# Patient Record
Sex: FEMALE | Race: WHITE | Employment: FULL TIME | URBAN - METROPOLITAN AREA
[De-identification: names, ages, dates, MRNs, and addresses within clinical notes are randomized per-mention and may not be internally consistent; named-entity substitution may affect disease eponyms.]

---

## 2017-01-17 ENCOUNTER — COMMUNICATION - HEALTHEAST (OUTPATIENT)
Dept: FAMILY MEDICINE | Facility: CLINIC | Age: 32
End: 2017-01-17

## 2017-02-24 ENCOUNTER — OFFICE VISIT - HEALTHEAST (OUTPATIENT)
Dept: FAMILY MEDICINE | Facility: CLINIC | Age: 32
End: 2017-02-24

## 2017-02-24 DIAGNOSIS — M54.50 ACUTE LEFT-SIDED LOW BACK PAIN WITHOUT SCIATICA: ICD-10-CM

## 2017-02-24 ASSESSMENT — MIFFLIN-ST. JEOR: SCORE: 1211.61

## 2017-03-01 ENCOUNTER — COMMUNICATION - HEALTHEAST (OUTPATIENT)
Dept: FAMILY MEDICINE | Facility: CLINIC | Age: 32
End: 2017-03-01

## 2017-03-02 ENCOUNTER — COMMUNICATION - HEALTHEAST (OUTPATIENT)
Dept: FAMILY MEDICINE | Facility: CLINIC | Age: 32
End: 2017-03-02

## 2017-03-02 ENCOUNTER — OFFICE VISIT - HEALTHEAST (OUTPATIENT)
Dept: FAMILY MEDICINE | Facility: CLINIC | Age: 32
End: 2017-03-02

## 2017-03-02 ENCOUNTER — AMBULATORY - HEALTHEAST (OUTPATIENT)
Dept: FAMILY MEDICINE | Facility: CLINIC | Age: 32
End: 2017-03-02

## 2017-03-02 DIAGNOSIS — N76.0 BACTERIAL VAGINOSIS: ICD-10-CM

## 2017-03-02 DIAGNOSIS — N89.8 VAGINAL DISCHARGE: ICD-10-CM

## 2017-03-02 DIAGNOSIS — B96.89 BACTERIAL VAGINOSIS: ICD-10-CM

## 2017-03-02 DIAGNOSIS — M54.50 CHRONIC LEFT-SIDED LOW BACK PAIN WITHOUT SCIATICA: ICD-10-CM

## 2017-03-02 DIAGNOSIS — G89.29 CHRONIC LEFT-SIDED LOW BACK PAIN WITHOUT SCIATICA: ICD-10-CM

## 2017-03-02 ASSESSMENT — MIFFLIN-ST. JEOR: SCORE: 1212.51

## 2017-03-08 ENCOUNTER — HOSPITAL ENCOUNTER (OUTPATIENT)
Dept: MRI IMAGING | Facility: CLINIC | Age: 32
Discharge: HOME | End: 2017-03-08
Attending: NURSE PRACTITIONER

## 2017-03-08 DIAGNOSIS — G89.29 CHRONIC LEFT-SIDED LOW BACK PAIN WITHOUT SCIATICA: ICD-10-CM

## 2017-03-08 DIAGNOSIS — M54.50 CHRONIC LEFT-SIDED LOW BACK PAIN WITHOUT SCIATICA: ICD-10-CM

## 2017-03-10 ENCOUNTER — COMMUNICATION - HEALTHEAST (OUTPATIENT)
Dept: FAMILY MEDICINE | Facility: CLINIC | Age: 32
End: 2017-03-10

## 2017-03-17 ENCOUNTER — COMMUNICATION - HEALTHEAST (OUTPATIENT)
Dept: FAMILY MEDICINE | Facility: CLINIC | Age: 32
End: 2017-03-17

## 2017-04-06 ENCOUNTER — RECORDS - HEALTHEAST (OUTPATIENT)
Dept: ADMINISTRATIVE | Facility: OTHER | Age: 32
End: 2017-04-06

## 2017-04-25 ENCOUNTER — COMMUNICATION - HEALTHEAST (OUTPATIENT)
Dept: FAMILY MEDICINE | Facility: CLINIC | Age: 32
End: 2017-04-25

## 2017-05-08 ENCOUNTER — OFFICE VISIT - HEALTHEAST (OUTPATIENT)
Dept: FAMILY MEDICINE | Facility: CLINIC | Age: 32
End: 2017-05-08

## 2017-05-08 DIAGNOSIS — B96.89 BACTERIAL VAGINOSIS: ICD-10-CM

## 2017-05-08 DIAGNOSIS — N76.0 BACTERIAL VAGINOSIS: ICD-10-CM

## 2017-05-08 ASSESSMENT — MIFFLIN-ST. JEOR: SCORE: 1228.39

## 2017-06-08 ENCOUNTER — OFFICE VISIT - HEALTHEAST (OUTPATIENT)
Dept: FAMILY MEDICINE | Facility: CLINIC | Age: 32
End: 2017-06-08

## 2017-06-08 DIAGNOSIS — F41.9 ANXIETY: ICD-10-CM
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
## 2017-06-08 DIAGNOSIS — G47.00 INSOMNIA, UNSPECIFIED TYPE: ICD-10-CM

## 2017-06-14 ENCOUNTER — COMMUNICATION - HEALTHEAST (OUTPATIENT)
Dept: HEALTH INFORMATION MANAGEMENT | Facility: CLINIC | Age: 32
End: 2017-06-14

## 2017-06-26 ENCOUNTER — COMMUNICATION - HEALTHEAST (OUTPATIENT)
Dept: FAMILY MEDICINE | Facility: CLINIC | Age: 32
End: 2017-06-26

## 2017-06-27 ENCOUNTER — AMBULATORY - HEALTHEAST (OUTPATIENT)
Dept: FAMILY MEDICINE | Facility: CLINIC | Age: 32
End: 2017-06-27

## 2017-06-27 DIAGNOSIS — R10.2 PELVIC PAIN: ICD-10-CM

## 2017-07-13 ENCOUNTER — OFFICE VISIT - HEALTHEAST (OUTPATIENT)
Dept: FAMILY MEDICINE | Facility: CLINIC | Age: 32
End: 2017-07-13

## 2017-07-13 DIAGNOSIS — K13.79 MOUTH SORE: ICD-10-CM

## 2017-07-13 ASSESSMENT — MIFFLIN-ST. JEOR: SCORE: 1205.71

## 2017-07-28 ENCOUNTER — OFFICE VISIT - HEALTHEAST (OUTPATIENT)
Dept: FAMILY MEDICINE | Facility: CLINIC | Age: 32
End: 2017-07-28

## 2017-07-28 DIAGNOSIS — N89.8 VAGINAL ODOR: ICD-10-CM

## 2017-07-28 DIAGNOSIS — N76.0 BACTERIAL VAGINOSIS: ICD-10-CM

## 2017-07-28 DIAGNOSIS — B96.89 BACTERIAL VAGINOSIS: ICD-10-CM

## 2017-07-28 DIAGNOSIS — R30.0 DYSURIA: ICD-10-CM

## 2017-08-22 ENCOUNTER — OFFICE VISIT - HEALTHEAST (OUTPATIENT)
Dept: FAMILY MEDICINE | Facility: CLINIC | Age: 32
End: 2017-08-22

## 2017-08-22 DIAGNOSIS — N89.8 VAGINAL ODOR: ICD-10-CM

## 2017-08-22 DIAGNOSIS — B96.89 BACTERIAL VAGINOSIS: ICD-10-CM

## 2017-08-22 DIAGNOSIS — N76.0 BACTERIAL VAGINOSIS: ICD-10-CM

## 2017-09-18 ENCOUNTER — COMMUNICATION - HEALTHEAST (OUTPATIENT)
Dept: FAMILY MEDICINE | Facility: CLINIC | Age: 32
End: 2017-09-18

## 2017-10-13 ENCOUNTER — OFFICE VISIT - HEALTHEAST (OUTPATIENT)
Dept: FAMILY MEDICINE | Facility: CLINIC | Age: 32
End: 2017-10-13

## 2017-10-13 DIAGNOSIS — B96.89 BACTERIAL VAGINOSIS: ICD-10-CM

## 2017-10-13 DIAGNOSIS — N76.0 BACTERIAL VAGINOSIS: ICD-10-CM

## 2017-11-13 ENCOUNTER — OFFICE VISIT - HEALTHEAST (OUTPATIENT)
Dept: FAMILY MEDICINE | Facility: CLINIC | Age: 32
End: 2017-11-13

## 2017-11-13 DIAGNOSIS — R07.0 THROAT PAIN: ICD-10-CM

## 2017-11-13 DIAGNOSIS — J06.9 VIRAL URI WITH COUGH: ICD-10-CM

## 2017-11-13 DIAGNOSIS — J02.9 ACUTE PHARYNGITIS, UNSPECIFIED ETIOLOGY: ICD-10-CM

## 2017-11-27 ENCOUNTER — COMMUNICATION - HEALTHEAST (OUTPATIENT)
Dept: FAMILY MEDICINE | Facility: CLINIC | Age: 32
End: 2017-11-27

## 2017-11-29 ENCOUNTER — OFFICE VISIT - HEALTHEAST (OUTPATIENT)
Dept: FAMILY MEDICINE | Facility: CLINIC | Age: 32
End: 2017-11-29

## 2017-11-29 DIAGNOSIS — G44.209 MUSCLE TENSION HEADACHE: ICD-10-CM

## 2017-11-29 DIAGNOSIS — R10.13 EPIGASTRIC PAIN: ICD-10-CM

## 2017-11-29 DIAGNOSIS — K29.70 GASTRITIS: ICD-10-CM

## 2017-11-29 DIAGNOSIS — R11.2 NAUSEA & VOMITING: ICD-10-CM

## 2018-01-18 ENCOUNTER — OFFICE VISIT - HEALTHEAST (OUTPATIENT)
Dept: FAMILY MEDICINE | Facility: CLINIC | Age: 33
End: 2018-01-18

## 2018-01-18 DIAGNOSIS — B96.89 BACTERIAL VAGINOSIS: ICD-10-CM

## 2018-01-18 DIAGNOSIS — N76.0 BACTERIAL VAGINOSIS: ICD-10-CM

## 2018-01-18 DIAGNOSIS — M54.2 NECK PAIN: ICD-10-CM

## 2018-01-18 DIAGNOSIS — M77.01 MEDIAL EPICONDYLITIS OF RIGHT ELBOW: ICD-10-CM

## 2018-01-18 ASSESSMENT — MIFFLIN-ST. JEOR: SCORE: 1239.27

## 2018-02-06 ENCOUNTER — OFFICE VISIT - HEALTHEAST (OUTPATIENT)
Dept: FAMILY MEDICINE | Facility: CLINIC | Age: 33
End: 2018-02-06

## 2018-02-06 DIAGNOSIS — Z01.818 ENCOUNTER FOR PREOPERATIVE EXAMINATION FOR GENERAL SURGICAL PROCEDURE: ICD-10-CM

## 2018-02-06 DIAGNOSIS — N83.202 CYST OF LEFT OVARY: ICD-10-CM

## 2018-02-06 DIAGNOSIS — J45.20 MILD INTERMITTENT ASTHMA WITHOUT COMPLICATION: ICD-10-CM

## 2018-02-06 DIAGNOSIS — N80.9 ENDOMETRIOSIS: ICD-10-CM

## 2018-02-06 DIAGNOSIS — M54.2 CERVICALGIA: ICD-10-CM

## 2018-02-06 LAB — HGB BLD-MCNC: 14.8 G/DL (ref 12–16)

## 2018-02-06 ASSESSMENT — MIFFLIN-ST. JEOR: SCORE: 1239.73

## 2018-02-08 ASSESSMENT — MIFFLIN-ST. JEOR: SCORE: 1251.07

## 2018-02-11 ENCOUNTER — RECORDS - HEALTHEAST (OUTPATIENT)
Dept: ADMINISTRATIVE | Facility: OTHER | Age: 33
End: 2018-02-11

## 2018-02-12 ENCOUNTER — ANESTHESIA - HEALTHEAST (OUTPATIENT)
Dept: SURGERY | Facility: AMBULATORY SURGERY CENTER | Age: 33
End: 2018-02-12

## 2018-02-13 ENCOUNTER — SURGERY - HEALTHEAST (OUTPATIENT)
Dept: SURGERY | Facility: AMBULATORY SURGERY CENTER | Age: 33
End: 2018-02-13

## 2018-03-14 ENCOUNTER — RECORDS - HEALTHEAST (OUTPATIENT)
Dept: ADMINISTRATIVE | Facility: OTHER | Age: 33
End: 2018-03-14

## 2018-03-22 ENCOUNTER — RECORDS - HEALTHEAST (OUTPATIENT)
Dept: ADMINISTRATIVE | Facility: OTHER | Age: 33
End: 2018-03-22

## 2018-04-09 ENCOUNTER — OFFICE VISIT - HEALTHEAST (OUTPATIENT)
Dept: FAMILY MEDICINE | Facility: CLINIC | Age: 33
End: 2018-04-09

## 2018-04-09 DIAGNOSIS — A08.4 VIRAL GASTROENTERITIS: ICD-10-CM

## 2018-04-09 DIAGNOSIS — R11.0 NAUSEA: ICD-10-CM

## 2018-04-09 DIAGNOSIS — R23.2 HOT FLASHES: ICD-10-CM

## 2018-04-09 DIAGNOSIS — K64.9 HEMORRHOIDS, UNSPECIFIED HEMORRHOID TYPE: ICD-10-CM

## 2018-04-09 LAB
FSH SERPL-ACNC: 5.8 MIU/ML
LH SERPL-ACNC: 6.3 MIU/ML
TSH SERPL DL<=0.005 MIU/L-ACNC: 1.16 UIU/ML (ref 0.3–5)

## 2018-04-09 ASSESSMENT — MIFFLIN-ST. JEOR: SCORE: 1210.24

## 2018-04-12 LAB
TESTOST SERPL-MCNC: 12 NG/DL (ref 8–60)
TESTOSTERONE, FREE, S - HISTORICAL: 0.16 NG/DL (ref 0.06–1.03)

## 2018-05-01 ENCOUNTER — COMMUNICATION - HEALTHEAST (OUTPATIENT)
Dept: SCHEDULING | Facility: CLINIC | Age: 33
End: 2018-05-01

## 2018-05-02 ENCOUNTER — OFFICE VISIT - HEALTHEAST (OUTPATIENT)
Dept: FAMILY MEDICINE | Facility: CLINIC | Age: 33
End: 2018-05-02

## 2018-05-02 DIAGNOSIS — R11.0 NAUSEA: ICD-10-CM

## 2018-05-02 DIAGNOSIS — R00.2 PALPITATIONS: ICD-10-CM

## 2018-05-02 DIAGNOSIS — F41.1 ANXIETY STATE: ICD-10-CM

## 2018-05-02 LAB
ANION GAP SERPL CALCULATED.3IONS-SCNC: 5 MMOL/L (ref 5–18)
ATRIAL RATE - MUSE: 65 BPM
BUN SERPL-MCNC: 17 MG/DL (ref 8–22)
CALCIUM SERPL-MCNC: 9.6 MG/DL (ref 8.5–10.5)
CHLORIDE BLD-SCNC: 102 MMOL/L (ref 98–107)
CO2 SERPL-SCNC: 26 MMOL/L (ref 22–31)
CREAT SERPL-MCNC: 0.74 MG/DL (ref 0.6–1.1)
DIASTOLIC BLOOD PRESSURE - MUSE: NORMAL MMHG
GFR SERPL CREATININE-BSD FRML MDRD: >60 ML/MIN/1.73M2
GLUCOSE BLD-MCNC: 80 MG/DL (ref 70–125)
INTERPRETATION ECG - MUSE: NORMAL
P AXIS - MUSE: 72 DEGREES
POTASSIUM BLD-SCNC: 4.1 MMOL/L (ref 3.5–5)
PR INTERVAL - MUSE: 144 MS
QRS DURATION - MUSE: 84 MS
QT - MUSE: 390 MS
QTC - MUSE: 405 MS
R AXIS - MUSE: 84 DEGREES
SODIUM SERPL-SCNC: 133 MMOL/L (ref 136–145)
SYSTOLIC BLOOD PRESSURE - MUSE: NORMAL MMHG
T AXIS - MUSE: 51 DEGREES
VENTRICULAR RATE- MUSE: 65 BPM

## 2018-05-02 ASSESSMENT — MIFFLIN-ST. JEOR: SCORE: 1208.43

## 2018-05-17 ENCOUNTER — COMMUNICATION - HEALTHEAST (OUTPATIENT)
Dept: FAMILY MEDICINE | Facility: CLINIC | Age: 33
End: 2018-05-17

## 2018-05-18 ENCOUNTER — RECORDS - HEALTHEAST (OUTPATIENT)
Dept: ADMINISTRATIVE | Facility: OTHER | Age: 33
End: 2018-05-18

## 2018-05-18 ENCOUNTER — COMMUNICATION - HEALTHEAST (OUTPATIENT)
Dept: SCHEDULING | Facility: CLINIC | Age: 33
End: 2018-05-18

## 2018-05-22 ENCOUNTER — RECORDS - HEALTHEAST (OUTPATIENT)
Dept: ADMINISTRATIVE | Facility: OTHER | Age: 33
End: 2018-05-22

## 2018-05-23 ENCOUNTER — COMMUNICATION - HEALTHEAST (OUTPATIENT)
Dept: FAMILY MEDICINE | Facility: CLINIC | Age: 33
End: 2018-05-23

## 2018-05-23 DIAGNOSIS — R11.0 NAUSEA: ICD-10-CM

## 2018-05-24 ENCOUNTER — RECORDS - HEALTHEAST (OUTPATIENT)
Dept: ADMINISTRATIVE | Facility: OTHER | Age: 33
End: 2018-05-24

## 2018-05-30 ENCOUNTER — RECORDS - HEALTHEAST (OUTPATIENT)
Dept: ADMINISTRATIVE | Facility: OTHER | Age: 33
End: 2018-05-30

## 2018-06-01 ENCOUNTER — OFFICE VISIT - HEALTHEAST (OUTPATIENT)
Dept: FAMILY MEDICINE | Facility: CLINIC | Age: 33
End: 2018-06-01

## 2018-06-01 DIAGNOSIS — S16.1XXA ACUTE CERVICAL MYOFASCIAL STRAIN: ICD-10-CM

## 2018-06-01 DIAGNOSIS — S46.811A TRAPEZIUS STRAIN, RIGHT, INITIAL ENCOUNTER: ICD-10-CM

## 2018-06-19 ENCOUNTER — RECORDS - HEALTHEAST (OUTPATIENT)
Dept: ADMINISTRATIVE | Facility: OTHER | Age: 33
End: 2018-06-19

## 2018-06-20 ENCOUNTER — RECORDS - HEALTHEAST (OUTPATIENT)
Dept: ADMINISTRATIVE | Facility: OTHER | Age: 33
End: 2018-06-20

## 2018-07-02 ENCOUNTER — OFFICE VISIT - HEALTHEAST (OUTPATIENT)
Dept: FAMILY MEDICINE | Facility: CLINIC | Age: 33
End: 2018-07-02

## 2018-07-02 DIAGNOSIS — K64.9 HEMORRHOIDS, UNSPECIFIED HEMORRHOID TYPE: ICD-10-CM

## 2018-07-02 DIAGNOSIS — J45.20 MILD INTERMITTENT ASTHMA WITHOUT COMPLICATION: ICD-10-CM

## 2018-07-02 DIAGNOSIS — Z01.818 ENCOUNTER FOR PREOPERATIVE EXAMINATION FOR GENERAL SURGICAL PROCEDURE: ICD-10-CM

## 2018-07-02 LAB — HGB BLD-MCNC: 18.8 G/DL (ref 12–16)

## 2018-07-02 ASSESSMENT — MIFFLIN-ST. JEOR: SCORE: 1202.53

## 2018-07-03 ENCOUNTER — OFFICE VISIT - HEALTHEAST (OUTPATIENT)
Dept: FAMILY MEDICINE | Facility: CLINIC | Age: 33
End: 2018-07-03

## 2018-07-03 ENCOUNTER — RECORDS - HEALTHEAST (OUTPATIENT)
Dept: ADMINISTRATIVE | Facility: OTHER | Age: 33
End: 2018-07-03

## 2018-07-03 DIAGNOSIS — M54.2 NECK PAIN: ICD-10-CM

## 2018-07-03 DIAGNOSIS — M25.50 POLYARTHRALGIA: ICD-10-CM

## 2018-07-03 DIAGNOSIS — M54.9 BACK PAIN: ICD-10-CM

## 2018-07-03 LAB
ERYTHROCYTE [SEDIMENTATION RATE] IN BLOOD BY WESTERGREN METHOD: 2 MM/HR (ref 0–20)
RHEUMATOID FACT SERPL-ACNC: <15 IU/ML (ref 0–30)

## 2018-07-03 ASSESSMENT — MIFFLIN-ST. JEOR: SCORE: 1201.17

## 2018-07-04 LAB — B BURGDOR IGG+IGM SER QL: 0.17 INDEX VALUE

## 2018-07-05 LAB — ANA SER QL: 0.1 U

## 2018-07-08 ENCOUNTER — RECORDS - HEALTHEAST (OUTPATIENT)
Dept: ADMINISTRATIVE | Facility: OTHER | Age: 33
End: 2018-07-08

## 2018-07-18 ENCOUNTER — ANESTHESIA - HEALTHEAST (OUTPATIENT)
Dept: SURGERY | Facility: AMBULATORY SURGERY CENTER | Age: 33
End: 2018-07-18

## 2018-07-19 ENCOUNTER — SURGERY - HEALTHEAST (OUTPATIENT)
Dept: SURGERY | Facility: AMBULATORY SURGERY CENTER | Age: 33
End: 2018-07-19

## 2018-07-19 ASSESSMENT — MIFFLIN-ST. JEOR: SCORE: 1201.17

## 2018-08-09 ENCOUNTER — RECORDS - HEALTHEAST (OUTPATIENT)
Dept: ADMINISTRATIVE | Facility: OTHER | Age: 33
End: 2018-08-09

## 2018-08-11 ENCOUNTER — HOSPITAL ENCOUNTER (OUTPATIENT)
Dept: MRI IMAGING | Facility: CLINIC | Age: 33
Discharge: HOME | End: 2018-08-11
Attending: COLON & RECTAL SURGERY

## 2018-08-11 DIAGNOSIS — K61.1 PERIRECTAL ABSCESS: ICD-10-CM

## 2018-08-13 ENCOUNTER — HOSPITAL ENCOUNTER (OUTPATIENT)
Dept: PALLIATIVE MEDICINE | Facility: OTHER | Age: 33
Discharge: HOME | End: 2018-08-13
Attending: ANESTHESIOLOGY

## 2018-08-13 DIAGNOSIS — G89.4 CHRONIC PAIN SYNDROME: ICD-10-CM

## 2018-08-15 ENCOUNTER — OFFICE VISIT - HEALTHEAST (OUTPATIENT)
Dept: FAMILY MEDICINE | Facility: CLINIC | Age: 33
End: 2018-08-15

## 2018-08-15 ENCOUNTER — COMMUNICATION - HEALTHEAST (OUTPATIENT)
Dept: SCHEDULING | Facility: CLINIC | Age: 33
End: 2018-08-15

## 2018-08-15 DIAGNOSIS — K61.1 PERIRECTAL ABSCESS: ICD-10-CM

## 2018-08-15 DIAGNOSIS — K64.4 EXTERNAL HEMORRHOIDS: ICD-10-CM

## 2018-11-08 ENCOUNTER — OFFICE VISIT - HEALTHEAST (OUTPATIENT)
Dept: FAMILY MEDICINE | Facility: CLINIC | Age: 33
End: 2018-11-08

## 2018-11-08 DIAGNOSIS — R51.9 CHRONIC NONINTRACTABLE HEADACHE, UNSPECIFIED HEADACHE TYPE: ICD-10-CM

## 2018-11-08 DIAGNOSIS — G89.29 CHRONIC NONINTRACTABLE HEADACHE, UNSPECIFIED HEADACHE TYPE: ICD-10-CM

## 2018-11-08 DIAGNOSIS — J01.10 ACUTE NON-RECURRENT FRONTAL SINUSITIS: ICD-10-CM

## 2018-11-08 DIAGNOSIS — F33.1 MODERATE EPISODE OF RECURRENT MAJOR DEPRESSIVE DISORDER (H): ICD-10-CM

## 2018-11-08 ASSESSMENT — MIFFLIN-ST. JEOR: SCORE: 1234.74

## 2018-11-13 ENCOUNTER — COMMUNICATION - HEALTHEAST (OUTPATIENT)
Dept: FAMILY MEDICINE | Facility: CLINIC | Age: 33
End: 2018-11-13

## 2018-11-13 ENCOUNTER — OFFICE VISIT - HEALTHEAST (OUTPATIENT)
Dept: FAMILY MEDICINE | Facility: CLINIC | Age: 33
End: 2018-11-13

## 2018-11-13 DIAGNOSIS — N89.8 VAGINAL ODOR: ICD-10-CM

## 2018-11-13 LAB
CLUE CELLS: NORMAL
TRICHOMONAS, WET PREP: NORMAL
YEAST, WET PREP: NORMAL

## 2018-11-26 ENCOUNTER — RECORDS - HEALTHEAST (OUTPATIENT)
Dept: ADMINISTRATIVE | Facility: OTHER | Age: 33
End: 2018-11-26

## 2018-11-30 ENCOUNTER — RECORDS - HEALTHEAST (OUTPATIENT)
Dept: ADMINISTRATIVE | Facility: OTHER | Age: 33
End: 2018-11-30

## 2018-12-04 ENCOUNTER — OFFICE VISIT - HEALTHEAST (OUTPATIENT)
Dept: FAMILY MEDICINE | Facility: CLINIC | Age: 33
End: 2018-12-04

## 2018-12-04 DIAGNOSIS — B96.89 BACTERIAL VAGINOSIS: ICD-10-CM

## 2018-12-04 DIAGNOSIS — N76.0 BACTERIAL VAGINOSIS: ICD-10-CM

## 2018-12-04 DIAGNOSIS — K59.03 DRUG-INDUCED CONSTIPATION: ICD-10-CM

## 2018-12-04 LAB
CLUE CELLS: ABNORMAL
TRICHOMONAS, WET PREP: ABNORMAL
YEAST, WET PREP: ABNORMAL

## 2018-12-05 ENCOUNTER — COMMUNICATION - HEALTHEAST (OUTPATIENT)
Dept: FAMILY MEDICINE | Facility: CLINIC | Age: 33
End: 2018-12-05

## 2018-12-11 ENCOUNTER — COMMUNICATION - HEALTHEAST (OUTPATIENT)
Dept: SCHEDULING | Facility: CLINIC | Age: 33
End: 2018-12-11

## 2018-12-18 ENCOUNTER — OFFICE VISIT - HEALTHEAST (OUTPATIENT)
Dept: FAMILY MEDICINE | Facility: CLINIC | Age: 33
End: 2018-12-18

## 2018-12-18 DIAGNOSIS — G89.29 CHRONIC NECK PAIN: ICD-10-CM

## 2018-12-18 DIAGNOSIS — M54.2 CHRONIC NECK PAIN: ICD-10-CM

## 2018-12-18 ASSESSMENT — MIFFLIN-ST. JEOR: SCORE: 1232.47

## 2018-12-19 ENCOUNTER — COMMUNICATION - HEALTHEAST (OUTPATIENT)
Dept: FAMILY MEDICINE | Facility: CLINIC | Age: 33
End: 2018-12-19

## 2018-12-19 DIAGNOSIS — G89.29 CHRONIC NONINTRACTABLE HEADACHE, UNSPECIFIED HEADACHE TYPE: ICD-10-CM

## 2018-12-19 DIAGNOSIS — R51.9 CHRONIC NONINTRACTABLE HEADACHE, UNSPECIFIED HEADACHE TYPE: ICD-10-CM

## 2019-01-10 ENCOUNTER — HOSPITAL ENCOUNTER (OUTPATIENT)
Dept: MRI IMAGING | Facility: CLINIC | Age: 34
Discharge: HOME | End: 2019-01-10
Attending: NURSE PRACTITIONER

## 2019-01-10 ENCOUNTER — AMBULATORY - HEALTHEAST (OUTPATIENT)
Dept: FAMILY MEDICINE | Facility: CLINIC | Age: 34
End: 2019-01-10

## 2019-01-10 ENCOUNTER — COMMUNICATION - HEALTHEAST (OUTPATIENT)
Dept: FAMILY MEDICINE | Facility: CLINIC | Age: 34
End: 2019-01-10

## 2019-01-10 DIAGNOSIS — R11.0 NAUSEA: ICD-10-CM

## 2019-01-23 ENCOUNTER — RECORDS - HEALTHEAST (OUTPATIENT)
Dept: ADMINISTRATIVE | Facility: OTHER | Age: 34
End: 2019-01-23

## 2019-02-01 ENCOUNTER — RECORDS - HEALTHEAST (OUTPATIENT)
Dept: ADMINISTRATIVE | Facility: OTHER | Age: 34
End: 2019-02-01

## 2019-02-26 ENCOUNTER — OFFICE VISIT - HEALTHEAST (OUTPATIENT)
Dept: FAMILY MEDICINE | Facility: CLINIC | Age: 34
End: 2019-02-26

## 2019-02-26 DIAGNOSIS — M54.2 CERVICALGIA: ICD-10-CM

## 2019-02-26 DIAGNOSIS — R11.0 NAUSEA: ICD-10-CM

## 2019-02-26 DIAGNOSIS — M54.12 CERVICAL RADICULOPATHY: ICD-10-CM

## 2019-02-26 ASSESSMENT — MIFFLIN-ST. JEOR: SCORE: 1223.4

## 2019-03-11 ENCOUNTER — COMMUNICATION - HEALTHEAST (OUTPATIENT)
Dept: FAMILY MEDICINE | Facility: CLINIC | Age: 34
End: 2019-03-11

## 2019-03-11 DIAGNOSIS — R11.0 NAUSEA: ICD-10-CM

## 2019-03-25 ENCOUNTER — RECORDS - HEALTHEAST (OUTPATIENT)
Dept: ADMINISTRATIVE | Facility: OTHER | Age: 34
End: 2019-03-25

## 2019-03-26 ENCOUNTER — COMMUNICATION - HEALTHEAST (OUTPATIENT)
Dept: FAMILY MEDICINE | Facility: CLINIC | Age: 34
End: 2019-03-26

## 2019-03-26 DIAGNOSIS — Z11.1 SCREENING-PULMONARY TB: ICD-10-CM

## 2019-04-02 ENCOUNTER — AMBULATORY - HEALTHEAST (OUTPATIENT)
Dept: NURSING | Facility: CLINIC | Age: 34
End: 2019-04-02

## 2019-04-02 DIAGNOSIS — Z11.1 SCREENING-PULMONARY TB: ICD-10-CM

## 2019-04-03 ENCOUNTER — COMMUNICATION - HEALTHEAST (OUTPATIENT)
Dept: FAMILY MEDICINE | Facility: CLINIC | Age: 34
End: 2019-04-03

## 2019-04-03 DIAGNOSIS — R11.0 NAUSEA: ICD-10-CM

## 2019-05-09 ENCOUNTER — OFFICE VISIT - HEALTHEAST (OUTPATIENT)
Dept: FAMILY MEDICINE | Facility: CLINIC | Age: 34
End: 2019-05-09

## 2019-05-09 DIAGNOSIS — M54.2 CERVICALGIA: ICD-10-CM

## 2019-05-09 DIAGNOSIS — R11.0 NAUSEA: ICD-10-CM

## 2019-05-09 DIAGNOSIS — R53.83 FATIGUE, UNSPECIFIED TYPE: ICD-10-CM

## 2019-05-09 DIAGNOSIS — J45.20 MILD INTERMITTENT ASTHMA WITHOUT COMPLICATION: ICD-10-CM

## 2019-05-09 LAB
ALBUMIN SERPL-MCNC: 4.3 G/DL (ref 3.5–5)
ALP SERPL-CCNC: 59 U/L (ref 45–120)
ALT SERPL W P-5'-P-CCNC: 17 U/L (ref 0–45)
ANION GAP SERPL CALCULATED.3IONS-SCNC: 9 MMOL/L (ref 5–18)
AST SERPL W P-5'-P-CCNC: 19 U/L (ref 0–40)
BILIRUB SERPL-MCNC: 0.5 MG/DL (ref 0–1)
BUN SERPL-MCNC: 15 MG/DL (ref 8–22)
CALCIUM SERPL-MCNC: 9.8 MG/DL (ref 8.5–10.5)
CHLORIDE BLD-SCNC: 106 MMOL/L (ref 98–107)
CO2 SERPL-SCNC: 26 MMOL/L (ref 22–31)
CREAT SERPL-MCNC: 0.93 MG/DL (ref 0.6–1.1)
ERYTHROCYTE [DISTWIDTH] IN BLOOD BY AUTOMATED COUNT: 10.7 % (ref 11–14.5)
ERYTHROCYTE [SEDIMENTATION RATE] IN BLOOD BY WESTERGREN METHOD: 1 MM/HR (ref 0–20)
GFR SERPL CREATININE-BSD FRML MDRD: >60 ML/MIN/1.73M2
GLUCOSE BLD-MCNC: 72 MG/DL (ref 70–125)
HCT VFR BLD AUTO: 45.5 % (ref 35–47)
HGB BLD-MCNC: 15.1 G/DL (ref 12–16)
MCH RBC QN AUTO: 34 PG (ref 27–34)
MCHC RBC AUTO-ENTMCNC: 33.2 G/DL (ref 32–36)
MCV RBC AUTO: 102 FL (ref 80–100)
PLATELET # BLD AUTO: 228 THOU/UL (ref 140–440)
PMV BLD AUTO: 8.8 FL (ref 7–10)
POTASSIUM BLD-SCNC: 4.6 MMOL/L (ref 3.5–5)
PROT SERPL-MCNC: 6.6 G/DL (ref 6–8)
RBC # BLD AUTO: 4.44 MILL/UL (ref 3.8–5.4)
SODIUM SERPL-SCNC: 141 MMOL/L (ref 136–145)
TSH SERPL DL<=0.005 MIU/L-ACNC: 2.07 UIU/ML (ref 0.3–5)
VIT B12 SERPL-MCNC: 1136 PG/ML (ref 213–816)
WBC: 8 THOU/UL (ref 4–11)

## 2019-05-09 ASSESSMENT — MIFFLIN-ST. JEOR: SCORE: 1217.5

## 2019-05-10 ENCOUNTER — COMMUNICATION - HEALTHEAST (OUTPATIENT)
Dept: FAMILY MEDICINE | Facility: CLINIC | Age: 34
End: 2019-05-10

## 2019-05-10 LAB — 25(OH)D3 SERPL-MCNC: 45.7 NG/ML (ref 30–80)

## 2019-06-03 ENCOUNTER — RECORDS - HEALTHEAST (OUTPATIENT)
Dept: ADMINISTRATIVE | Facility: OTHER | Age: 34
End: 2019-06-03

## 2019-06-03 ENCOUNTER — COMMUNICATION - HEALTHEAST (OUTPATIENT)
Dept: FAMILY MEDICINE | Facility: CLINIC | Age: 34
End: 2019-06-03

## 2019-06-11 ENCOUNTER — RECORDS - HEALTHEAST (OUTPATIENT)
Dept: ADMINISTRATIVE | Facility: OTHER | Age: 34
End: 2019-06-11

## 2019-07-11 ENCOUNTER — OFFICE VISIT - HEALTHEAST (OUTPATIENT)
Dept: FAMILY MEDICINE | Facility: CLINIC | Age: 34
End: 2019-07-11

## 2019-07-11 ENCOUNTER — COMMUNICATION - HEALTHEAST (OUTPATIENT)
Dept: SCHEDULING | Facility: CLINIC | Age: 34
End: 2019-07-11

## 2019-07-11 DIAGNOSIS — R10.13 DYSPEPSIA: ICD-10-CM

## 2019-07-11 DIAGNOSIS — R11.0 NAUSEA: ICD-10-CM

## 2019-09-17 ENCOUNTER — RECORDS - HEALTHEAST (OUTPATIENT)
Dept: ADMINISTRATIVE | Facility: OTHER | Age: 34
End: 2019-09-17

## 2019-10-10 ENCOUNTER — OFFICE VISIT - HEALTHEAST (OUTPATIENT)
Dept: FAMILY MEDICINE | Facility: CLINIC | Age: 34
End: 2019-10-10

## 2019-10-10 DIAGNOSIS — A08.4 VIRAL GASTROENTERITIS: ICD-10-CM

## 2019-10-10 ASSESSMENT — PATIENT HEALTH QUESTIONNAIRE - PHQ9: SUM OF ALL RESPONSES TO PHQ QUESTIONS 1-9: 5

## 2019-10-24 ENCOUNTER — OFFICE VISIT - HEALTHEAST (OUTPATIENT)
Dept: FAMILY MEDICINE | Facility: CLINIC | Age: 34
End: 2019-10-24

## 2019-10-24 DIAGNOSIS — J45.21 MILD INTERMITTENT ASTHMA WITH EXACERBATION: ICD-10-CM

## 2019-10-24 DIAGNOSIS — A08.4 VIRAL GASTROENTERITIS: ICD-10-CM

## 2019-12-25 ENCOUNTER — RECORDS - HEALTHEAST (OUTPATIENT)
Dept: ADMINISTRATIVE | Facility: OTHER | Age: 34
End: 2019-12-25

## 2019-12-26 ENCOUNTER — OFFICE VISIT - HEALTHEAST (OUTPATIENT)
Dept: FAMILY MEDICINE | Facility: CLINIC | Age: 34
End: 2019-12-26

## 2019-12-26 DIAGNOSIS — A08.4 VIRAL GASTROENTERITIS: ICD-10-CM

## 2019-12-26 DIAGNOSIS — S06.0X9A CONCUSSION WITH LOSS OF CONSCIOUSNESS, INITIAL ENCOUNTER: ICD-10-CM

## 2019-12-26 ASSESSMENT — MIFFLIN-ST. JEOR: SCORE: 1243.81

## 2020-02-18 ENCOUNTER — RECORDS - HEALTHEAST (OUTPATIENT)
Dept: ADMINISTRATIVE | Facility: OTHER | Age: 35
End: 2020-02-18

## 2020-02-21 ENCOUNTER — COMMUNICATION - HEALTHEAST (OUTPATIENT)
Dept: SCHEDULING | Facility: CLINIC | Age: 35
End: 2020-02-21

## 2020-02-21 ENCOUNTER — OFFICE VISIT - HEALTHEAST (OUTPATIENT)
Dept: FAMILY MEDICINE | Facility: CLINIC | Age: 35
End: 2020-02-21

## 2020-02-21 DIAGNOSIS — K21.9 GASTROESOPHAGEAL REFLUX DISEASE, ESOPHAGITIS PRESENCE NOT SPECIFIED: ICD-10-CM

## 2020-02-21 DIAGNOSIS — A08.4 VIRAL GASTROENTERITIS: ICD-10-CM

## 2020-03-06 ENCOUNTER — COMMUNICATION - HEALTHEAST (OUTPATIENT)
Dept: SCHEDULING | Facility: CLINIC | Age: 35
End: 2020-03-06

## 2020-03-09 ENCOUNTER — COMMUNICATION - HEALTHEAST (OUTPATIENT)
Dept: SCHEDULING | Facility: CLINIC | Age: 35
End: 2020-03-09

## 2020-03-31 ENCOUNTER — OFFICE VISIT - HEALTHEAST (OUTPATIENT)
Dept: FAMILY MEDICINE | Facility: CLINIC | Age: 35
End: 2020-03-31

## 2020-03-31 DIAGNOSIS — R11.0 NAUSEA: ICD-10-CM

## 2020-03-31 DIAGNOSIS — G43.809 OTHER MIGRAINE WITHOUT STATUS MIGRAINOSUS, NOT INTRACTABLE: ICD-10-CM

## 2020-03-31 ASSESSMENT — PATIENT HEALTH QUESTIONNAIRE - PHQ9: SUM OF ALL RESPONSES TO PHQ QUESTIONS 1-9: 0

## 2020-04-14 ENCOUNTER — COMMUNICATION - HEALTHEAST (OUTPATIENT)
Dept: SCHEDULING | Facility: CLINIC | Age: 35
End: 2020-04-14

## 2020-04-20 ENCOUNTER — COMMUNICATION - HEALTHEAST (OUTPATIENT)
Dept: SCHEDULING | Facility: CLINIC | Age: 35
End: 2020-04-20

## 2020-04-20 ENCOUNTER — OFFICE VISIT - HEALTHEAST (OUTPATIENT)
Dept: FAMILY MEDICINE | Facility: CLINIC | Age: 35
End: 2020-04-20

## 2020-04-20 DIAGNOSIS — R00.2 PALPITATIONS: ICD-10-CM

## 2020-04-20 DIAGNOSIS — F41.9 ANXIETY: ICD-10-CM

## 2020-04-20 DIAGNOSIS — R06.02 SHORTNESS OF BREATH: ICD-10-CM

## 2020-04-20 DIAGNOSIS — D58.2 ELEVATED HEMOGLOBIN (H): ICD-10-CM

## 2020-04-23 ENCOUNTER — AMBULATORY - HEALTHEAST (OUTPATIENT)
Dept: LAB | Facility: CLINIC | Age: 35
End: 2020-04-23

## 2020-04-23 DIAGNOSIS — R00.2 PALPITATIONS: ICD-10-CM

## 2020-04-23 LAB
ANION GAP SERPL CALCULATED.3IONS-SCNC: 9 MMOL/L (ref 5–18)
BUN SERPL-MCNC: 16 MG/DL (ref 8–22)
CALCIUM SERPL-MCNC: 9 MG/DL (ref 8.5–10.5)
CHLORIDE BLD-SCNC: 108 MMOL/L (ref 98–107)
CO2 SERPL-SCNC: 23 MMOL/L (ref 22–31)
CREAT SERPL-MCNC: 0.86 MG/DL (ref 0.6–1.1)
ERYTHROCYTE [DISTWIDTH] IN BLOOD BY AUTOMATED COUNT: 10.5 % (ref 11–14.5)
GFR SERPL CREATININE-BSD FRML MDRD: >60 ML/MIN/1.73M2
GLUCOSE BLD-MCNC: 104 MG/DL (ref 70–125)
HCT VFR BLD AUTO: 45.6 % (ref 35–47)
HGB BLD-MCNC: 15.9 G/DL (ref 12–16)
MCH RBC QN AUTO: 34.2 PG (ref 27–34)
MCHC RBC AUTO-ENTMCNC: 34.9 G/DL (ref 32–36)
MCV RBC AUTO: 98 FL (ref 80–100)
PLATELET # BLD AUTO: 226 THOU/UL (ref 140–440)
PMV BLD AUTO: 8.2 FL (ref 7–10)
POTASSIUM BLD-SCNC: 4.2 MMOL/L (ref 3.5–5)
RBC # BLD AUTO: 4.65 MILL/UL (ref 3.8–5.4)
SODIUM SERPL-SCNC: 140 MMOL/L (ref 136–145)
TSH SERPL DL<=0.005 MIU/L-ACNC: 0.91 UIU/ML (ref 0.3–5)
WBC: 8.3 THOU/UL (ref 4–11)

## 2020-04-27 ENCOUNTER — OFFICE VISIT - HEALTHEAST (OUTPATIENT)
Dept: CARDIOLOGY | Facility: CLINIC | Age: 35
End: 2020-04-27

## 2020-04-27 DIAGNOSIS — R07.2 PRECORDIAL PAIN: ICD-10-CM

## 2020-04-27 DIAGNOSIS — R00.2 PALPITATIONS: ICD-10-CM

## 2020-04-27 ASSESSMENT — MIFFLIN-ST. JEOR: SCORE: 1237.46

## 2020-05-06 ENCOUNTER — HOSPITAL ENCOUNTER (OUTPATIENT)
Dept: CARDIOLOGY | Facility: HOSPITAL | Age: 35
Discharge: HOME | End: 2020-05-06
Attending: INTERNAL MEDICINE

## 2020-05-06 LAB
CV STRESS CURRENT BP HE: NORMAL
CV STRESS CURRENT HR HE: 100
CV STRESS CURRENT HR HE: 101
CV STRESS CURRENT HR HE: 103
CV STRESS CURRENT HR HE: 103
CV STRESS CURRENT HR HE: 105
CV STRESS CURRENT HR HE: 107
CV STRESS CURRENT HR HE: 107
CV STRESS CURRENT HR HE: 108
CV STRESS CURRENT HR HE: 109
CV STRESS CURRENT HR HE: 111
CV STRESS CURRENT HR HE: 111
CV STRESS CURRENT HR HE: 113
CV STRESS CURRENT HR HE: 115
CV STRESS CURRENT HR HE: 115
CV STRESS CURRENT HR HE: 123
CV STRESS CURRENT HR HE: 125
CV STRESS CURRENT HR HE: 126
CV STRESS CURRENT HR HE: 127
CV STRESS CURRENT HR HE: 133
CV STRESS CURRENT HR HE: 140
CV STRESS CURRENT HR HE: 141
CV STRESS CURRENT HR HE: 143
CV STRESS CURRENT HR HE: 146
CV STRESS CURRENT HR HE: 150
CV STRESS CURRENT HR HE: 157
CV STRESS CURRENT HR HE: 164
CV STRESS CURRENT HR HE: 164
CV STRESS CURRENT HR HE: 169
CV STRESS CURRENT HR HE: 87
CV STRESS CURRENT HR HE: 89
CV STRESS CURRENT HR HE: 94
CV STRESS CURRENT HR HE: 94
CV STRESS DEVIATION TIME HE: NORMAL
CV STRESS ECHO PERCENT HR HE: NORMAL
CV STRESS EXERCISE STAGE HE: NORMAL
CV STRESS FINAL RESTING BP HE: NORMAL
CV STRESS FINAL RESTING HR HE: 94
CV STRESS MAX HR HE: 166
CV STRESS MAX TREADMILL GRADE HE: 16
CV STRESS MAX TREADMILL SPEED HE: 4.2
CV STRESS PEAK DIA BP HE: NORMAL
CV STRESS PEAK SYS BP HE: NORMAL
CV STRESS PHASE HE: NORMAL
CV STRESS PROTOCOL HE: NORMAL
CV STRESS RESTING PT POSITION HE: NORMAL
CV STRESS ST DEVIATION AMOUNT HE: NORMAL
CV STRESS ST DEVIATION ELEVATION HE: NORMAL
CV STRESS ST EVELATION AMOUNT HE: NORMAL
CV STRESS TEST TYPE HE: NORMAL
CV STRESS TOTAL STAGE TIME MIN 1 HE: NORMAL
ECHO EJECTION FRACTION ESTIMATED: 60 %
RATE PRESSURE PRODUCT: NORMAL
STRESS ECHO BASELINE DIASTOLIC HE: 74
STRESS ECHO BASELINE HR: 98
STRESS ECHO BASELINE SYSTOLIC BP: 104
STRESS ECHO CALCULATED PERCENT HR: 90 %
STRESS ECHO LAST STRESS DIASTOLIC BP: 80
STRESS ECHO LAST STRESS HR: 169
STRESS ECHO LAST STRESS SYSTOLIC BP: 156
STRESS ECHO POST ESTIMATED WORKLOAD: 11.9
STRESS ECHO POST EXERCISE DUR MIN: 10
STRESS ECHO POST EXERCISE DUR SEC: 15
STRESS ECHO TARGET HR: 185

## 2020-05-21 ENCOUNTER — OFFICE VISIT - HEALTHEAST (OUTPATIENT)
Dept: CARDIOLOGY | Facility: CLINIC | Age: 35
End: 2020-05-21

## 2020-05-21 DIAGNOSIS — R00.2 PALPITATIONS: ICD-10-CM

## 2020-06-09 ENCOUNTER — OFFICE VISIT - HEALTHEAST (OUTPATIENT)
Dept: FAMILY MEDICINE | Facility: CLINIC | Age: 35
End: 2020-06-09

## 2020-06-09 DIAGNOSIS — R11.0 NAUSEA: ICD-10-CM

## 2020-06-09 DIAGNOSIS — M54.2 NECK PAIN: ICD-10-CM

## 2020-06-11 ENCOUNTER — COMMUNICATION - HEALTHEAST (OUTPATIENT)
Dept: CARDIOLOGY | Facility: CLINIC | Age: 35
End: 2020-06-11

## 2020-06-17 ENCOUNTER — COMMUNICATION - HEALTHEAST (OUTPATIENT)
Dept: FAMILY MEDICINE | Facility: CLINIC | Age: 35
End: 2020-06-17

## 2020-07-06 ENCOUNTER — COMMUNICATION - HEALTHEAST (OUTPATIENT)
Dept: NURSING | Facility: CLINIC | Age: 35
End: 2020-07-06

## 2020-07-06 ENCOUNTER — AMBULATORY - HEALTHEAST (OUTPATIENT)
Dept: CARE COORDINATION | Facility: CLINIC | Age: 35
End: 2020-07-06

## 2020-07-06 DIAGNOSIS — K04.7 DENTAL ABSCESS: ICD-10-CM

## 2020-07-10 ENCOUNTER — COMMUNICATION - HEALTHEAST (OUTPATIENT)
Dept: CARE COORDINATION | Facility: CLINIC | Age: 35
End: 2020-07-10

## 2020-07-14 ENCOUNTER — COMMUNICATION - HEALTHEAST (OUTPATIENT)
Dept: NURSING | Facility: CLINIC | Age: 35
End: 2020-07-14

## 2020-07-28 ENCOUNTER — RECORDS - HEALTHEAST (OUTPATIENT)
Dept: ADMINISTRATIVE | Facility: OTHER | Age: 35
End: 2020-07-28

## 2020-07-31 ENCOUNTER — OFFICE VISIT - HEALTHEAST (OUTPATIENT)
Dept: FAMILY MEDICINE | Facility: CLINIC | Age: 35
End: 2020-07-31

## 2020-07-31 DIAGNOSIS — M54.2 NECK PAIN: ICD-10-CM

## 2020-07-31 DIAGNOSIS — G44.209 TENSION HEADACHE: ICD-10-CM

## 2020-07-31 DIAGNOSIS — R11.0 NAUSEA: ICD-10-CM

## 2020-08-03 ENCOUNTER — COMMUNICATION - HEALTHEAST (OUTPATIENT)
Dept: FAMILY MEDICINE | Facility: CLINIC | Age: 35
End: 2020-08-03

## 2020-09-16 ENCOUNTER — VIRTUAL VISIT (OUTPATIENT)
Dept: NEUROLOGY | Facility: CLINIC | Age: 35
End: 2020-09-16
Payer: COMMERCIAL

## 2020-09-16 ENCOUNTER — RECORDS - HEALTHEAST (OUTPATIENT)
Dept: ADMINISTRATIVE | Facility: OTHER | Age: 35
End: 2020-09-16

## 2020-09-16 VITALS — BODY MASS INDEX: 21 KG/M2 | HEIGHT: 64 IN | WEIGHT: 123 LBS

## 2020-09-16 DIAGNOSIS — G47.00 INSOMNIA, UNSPECIFIED TYPE: ICD-10-CM

## 2020-09-16 DIAGNOSIS — G43.719 INTRACTABLE CHRONIC MIGRAINE WITHOUT AURA AND WITHOUT STATUS MIGRAINOSUS: Primary | ICD-10-CM

## 2020-09-16 DIAGNOSIS — M54.2 NECK PAIN: ICD-10-CM

## 2020-09-16 PROBLEM — F41.1 ANXIETY STATE: Status: ACTIVE | Noted: 2020-09-16

## 2020-09-16 PROBLEM — N83.202 LEFT OVARIAN CYST: Status: ACTIVE | Noted: 2018-02-13

## 2020-09-16 PROBLEM — G44.209 TENSION-TYPE HEADACHE: Status: ACTIVE | Noted: 2020-09-16

## 2020-09-16 PROBLEM — N80.9 ENDOMETRIOSIS: Status: ACTIVE | Noted: 2020-09-16

## 2020-09-16 PROBLEM — F33.9 MAJOR DEPRESSION, RECURRENT (H): Status: ACTIVE | Noted: 2020-09-16

## 2020-09-16 PROBLEM — R10.2 PELVIC PAIN: Status: ACTIVE | Noted: 2018-02-13

## 2020-09-16 PROCEDURE — 99214 OFFICE O/P EST MOD 30 MIN: CPT | Mod: GT | Performed by: PSYCHIATRY & NEUROLOGY

## 2020-09-16 RX ORDER — RIZATRIPTAN BENZOATE 10 MG/1
10 TABLET ORAL
COMMUNITY
Start: 2019-02-01 | End: 2020-09-16

## 2020-09-16 RX ORDER — PREDNISONE 10 MG/1
TABLET ORAL
COMMUNITY
Start: 2019-10-24 | End: 2020-09-16

## 2020-09-16 RX ORDER — ALBUTEROL SULFATE 0.83 MG/ML
SOLUTION RESPIRATORY (INHALATION)
COMMUNITY
Start: 2019-10-27 | End: 2020-09-16

## 2020-09-16 RX ORDER — PENICILLIN V POTASSIUM 500 MG/1
TABLET, FILM COATED ORAL
COMMUNITY
Start: 2020-07-03 | End: 2020-09-16

## 2020-09-16 RX ORDER — CYCLOBENZAPRINE HCL 5 MG
TABLET ORAL
COMMUNITY
Start: 2020-07-31 | End: 2020-09-16

## 2020-09-16 RX ORDER — HYDROCODONE BITARTRATE AND ACETAMINOPHEN 5; 325 MG/1; MG/1
TABLET ORAL
COMMUNITY
Start: 2020-07-03 | End: 2020-10-29

## 2020-09-16 RX ORDER — ELETRIPTAN HYDROBROMIDE 40 MG/1
40 TABLET, FILM COATED ORAL
Qty: 12 TABLET | Refills: 1 | Status: SHIPPED | OUTPATIENT
Start: 2020-09-16 | End: 2021-08-17

## 2020-09-16 RX ORDER — TOPIRAMATE 25 MG/1
TABLET, FILM COATED ORAL
COMMUNITY
Start: 2020-03-31 | End: 2020-09-16

## 2020-09-16 RX ORDER — NAPROXEN 500 MG/1
TABLET ORAL
COMMUNITY
Start: 2019-12-26 | End: 2020-10-29

## 2020-09-16 RX ORDER — OMEPRAZOLE 40 MG/1
CAPSULE, DELAYED RELEASE ORAL
COMMUNITY
Start: 2020-02-21 | End: 2020-09-16

## 2020-09-16 RX ORDER — PROPRANOLOL HYDROCHLORIDE 20 MG/1
TABLET ORAL
COMMUNITY
Start: 2020-05-21 | End: 2020-09-16

## 2020-09-16 RX ORDER — ONDANSETRON 4 MG/1
TABLET, FILM COATED ORAL
COMMUNITY
Start: 2018-12-21 | End: 2020-09-16

## 2020-09-16 RX ORDER — CEFDINIR 300 MG/1
CAPSULE ORAL
COMMUNITY
Start: 2019-10-27 | End: 2020-10-29

## 2020-09-16 RX ORDER — ONDANSETRON 4 MG/1
TABLET, ORALLY DISINTEGRATING ORAL
COMMUNITY
Start: 2020-07-31 | End: 2020-09-16

## 2020-09-16 RX ORDER — NORTRIPTYLINE HCL 10 MG
10 CAPSULE ORAL
COMMUNITY
Start: 2019-02-01 | End: 2020-10-29

## 2020-09-16 ASSESSMENT — MIFFLIN-ST. JEOR: SCORE: 1237.92

## 2020-09-16 NOTE — PROGRESS NOTES
"NEUROLOGY OUTPATIENT PROGRESS NOTE (VIDEO)  Sep 16, 2020     CHIEF COMPLAINT/REASON FOR VISIT/REASON FOR CONSULT  Patient presents with:  Headache    REASON FOR CONSULTATION- Worsening headaches.    Video Visit Consent  Patient is being evaluated via a billable video visit. The patient has been notified of following:   \"This video visit will be conducted via a call between you and your physician/provider. We have found that certain health care needs can be provided without the need for an in-person physical exam. This service lets us provide the care you need with a video conversation. If a prescription is necessary we can send it directly to your pharmacy. If lab work is needed we can place an order for that and you can then stop by our lab to have the test done at a later time.  If during the course of the call the physician/provider feels a video visit is not appropriate, you will not be charged for this service.  Physician has received verbal consent for a Video Visit from the patient? YES  Patient would like the video invitation sent by: Email/SMS    Video Visit Details  Type of service: Video Visit  Video Start Time: 12:30  Video End Time (time video stopped): 12:50  Originating Location (pt. Location): Patient's Home  Distant Location (provider location): [facility] [place]   Mode of Communication: Video Conference via [facility]    HISTORY OF PRESENT ILLNESS  Vicki Pierson is a 34 year old female seen for headaches. She reports that she has been having headaches for over 10 years. Headaches are generally starting in the back of the head. Sometimes it can come in the shoulder blade and then move up. She previously had a C6-C7 fusion because of the headaches but that made things worse. Headaches start in the neck or the shoulder blades and then move forward to the frontal region. The bilateral nature. The throbbing in nature with sharpness. There is photophobia and phonophobia. There is " no significant visual auras. She denies any triggers. She has difficulty sleeping at night. She has not tried any preventive medication. Takes ibuprofen 400 mg multiple times a day. Has not tried any other abortive medications. She does use Flexeril at night to help her sleep.    9/16/20  Patient returns today.  Reports that her headaches have worsened since she was last seen.  She was last seen in February 2019.  She reports that she has not identified any triggers on why the headaches have gotten worse.  She previously was prescribed nortriptyline by me.  She was switched to amitriptyline and then propanolol and Topamax by her primary care provider.  Without any benefit with these medications.  She further has tried sumatriptan and Maxalt without any benefit.  Reports not trying any other abortive medications.  Currently she is taking high doses of Tylenol and ibuprofen without any benefit.  Headaches happen almost every day.  She has more than 15 headache days a month.  Headaches generally wake her up in the middle of the night.  They are more of a pounding headache.  They generally come from the shoulder and neck and then move up and involve the whole head.  There is associated photophobia phonophobia.  She does feel nauseous.  Reports no triggers.  Sometimes she can have good morning and then the headache comes on later in the day.  She is tried increasing her fluid intake as well as getting more sleep without any benefit.    Previous history is reviewed and this is unchanged.    PAST MEDICAL/SURGICAL HISTORY  Past Medical History:   Diagnosis Date     Depressive disorder      Endometriosis      Ganglion cyst of left groin      Head injury      Major depression      Migraines      Patient Active Problem List   Diagnosis     Anxiety state     Cervical radiculopathy     Endometriosis     Left ovarian cyst     Major depression, recurrent (H)     Pelvic pain     Precordial pain     Tension-type headache     S/P  "partial hysterectomy   Significant for migraines and depression      FAMILY HISTORY  Family History   Problem Relation Age of Onset     Brain Hemorrhage Maternal Grandmother      Brain Hemorrhage Paternal Grandmother      Diabetes Paternal Grandmother    Reviewed and noncontributory      SOCIAL HISTORY  Social History     Tobacco Use     Smoking status: Former Smoker     Smokeless tobacco: Former User   Substance Use Topics     Alcohol use: Not Currently     Drug use: Not Currently       SYSTEMS REVIEW  Twelve-system ROS was done and other than the HPI this was negative.     MEDICATIONS  nortriptyline (PAMELOR) 10 MG capsule, Take 10 mg by mouth  cefdinir (OMNICEF) 300 MG capsule,   HYDROcodone-acetaminophen (NORCO) 5-325 MG tablet, TK 1 T PO  Q 6 H PRN P  naproxen (NAPROSYN) 500 MG tablet,     No current facility-administered medications on file prior to visit.        PHYSICAL EXAMINATION  VITALS: Ht 1.626 m (5' 4\")   Wt 55.8 kg (123 lb)   BMI 21.11 kg/m     PHYSICAL EXAM  Exam was limited due to video encounter.    Vitals-Unable to do on video  GENERAL -Health appearing, No apparent distress  EYES- No scleral icterus, no eyelid droop, Pupils symmetric  HEENT - Normocephalic, atraumatic, Hearing grossly intact; Oral mucosa moist and pink in color. External Ears and nose intact.   Neck - soft/flexible with normal ROM on visual inspection.  PULM - Good spontaneous respiratory effort;  CV- No edema on visual inspection  MSK- Gait - see Neuro section; Strength and tone- see Neuro section; Range of motion grossly intact.  Psych- Normal mood and affect. Good judgment and insight.     Neurological  Mental status - Patient is awake and oriented. Attention span is normal. Language is fluent and follows commands appropriately.   Cranial nerves - Pupils are symmetric; EOMI, NLF symmetric  Motor - There is no pronator drift. Antigravity in all 4 ext.  Tone - No evidence of rigidity on visual inspection. No tremor.  Reflexes " - Unable to do on video  Sensation - Unable to do on Video  Coordination - Finger to nose without dysmetria.   Gait and station - Romberg is negative. Gait is steady    PREVIOUS EXAM  GENERAL: Healthy appearing, alert, no acute distress, normal habitus.  CARDIOVASCULAR: Ext - warm and well perfused.  EYES: Funduscopic exam does not reveal any papilledema.   NEUROLOGICAL: Patient is awake and oriented to self, place and time. Attention span is normal. Immediate and delayed memory is normal. Language is fluent and follows commands appropriately. Appropriate fund of knowledge. Cranial nerves 2-12 are intact. There is no pronator drift. Motor exam shows 5/5 strength in all extremities. Tone is symmetric bilaterally in upper and lower extremities. Reflexes are symmetric and 2+ in upper extremities and lower extremities. Sensory exam is grossly intact to light touch, pin prick and vibration. Finger to nose and heel to shin is without dysmetria. Romberg is negative. Gait is normal and the patient is able to do tandem walk and walk on toes and heels.    DIAGNOSTICS  MRI  CONCLUSION:   1. No acute intracranial finding. No evidence for recent ischemia, intracranial hemorrhage, or mass.  2. Slight inferior cerebellar tonsillar ectopia. No Chiari malformation by radiographic criteria.    OUTSIDE RECORDS  Outside referral notes were reviewed and pertinent information has been summarized:-Patient is been treating her headaches with her primary care doctor.  She has tried Topamax, amitriptyline, propanolol without any benefit.  Maxalt sumatriptan also been tried.  She was referred to neurology for Botox injections.    IMPRESSION/REPORT/PLAN  Intractable chronic migraine without aura and without status migrainosus  Insomnia  Neck pain    This is a 34 year old female chronic migraines, insomnia and neck pain related to the neck fusion.  At this point patient's headaches have worsened.  We discussed treatment options for prevention  "of headaches.  She is interested in Botox and could look into that for her.  Furthermore we will prescribe Relpax for abortive therapy see if that works.  Encouraged her not to overuse abortive medications as that leads to rebound headaches.  I will see her back once the Botox has been approved.    -     eletriptan (RELPAX) 40 MG tablet; Take 1 tablet (40 mg) by mouth at onset of headache for migraine May repeat in 2 hours. Max 2 tablets/24 hours.    Over 25 minutes were spent coordinating the care for the patient today.  More than 50% of the time was spent counseling, educating the patient.    Botox PA:-  \" The patient has a diagnosis of chronic migraines. She has more than 15 headache days a month with each headache lasting at least 4 hours despite use of triptans. Her headaches have been there for over 6 months. She has been screened for medication overuse headaches and she does not have that. She has tried amitriptyline, Topamax, Propranolol for 3 months without any significant benefit. I would request that the Botox be approved for her. \"    Inder Urban MD  Neurologist  [facility]  Tel:- [phone]    This note was dictated using voice recognition software.  Any grammatical or context distortions are unintentional and inherent to the software.      "

## 2020-09-16 NOTE — LETTER
"    9/16/2020         RE: Vicki Pierson  [address] [facility] [address]        Dear Colleague,    Thank you for referring your patient, Vicki Pierson, to the [facility] [place]. Please see a copy of my visit note below.    NEUROLOGY OUTPATIENT PROGRESS NOTE (VIDEO)  Sep 16, 2020     CHIEF COMPLAINT/REASON FOR VISIT/REASON FOR CONSULT  Patient presents with:  Headache    REASON FOR CONSULTATION- Worsening headaches.    Video Visit Consent  Patient is being evaluated via a billable video visit. The patient has been notified of following:   \"This video visit will be conducted via a call between you and your physician/provider. We have found that certain health care needs can be provided without the need for an in-person physical exam. This service lets us provide the care you need with a video conversation. If a prescription is necessary we can send it directly to your pharmacy. If lab work is needed we can place an order for that and you can then stop by our lab to have the test done at a later time.  If during the course of the call the physician/provider feels a video visit is not appropriate, you will not be charged for this service.  Physician has received verbal consent for a Video Visit from the patient? YES  Patient would like the video invitation sent by: Email/SMS    Video Visit Details  Type of service: Video Visit  Video Start Time: 12:30  Video End Time (time video stopped): 12:50  Originating Location (pt. Location): Patient's Home  Distant Location (provider location): [facility] [place]   Mode of Communication: Video Conference via [employer]    HISTORY OF PRESENT ILLNESS  Vicki Pierson is a 34 year old female seen for headaches. She reports that she has been having headaches for over 10 years. Headaches are generally starting in the back of the head. Sometimes it can come in the shoulder blade and then move up. She previously had a C6-C7 fusion " because of the headaches but that made things worse. Headaches start in the neck or the shoulder blades and then move forward to the frontal region. The bilateral nature. The throbbing in nature with sharpness. There is photophobia and phonophobia. There is no significant visual auras. She denies any triggers. She has difficulty sleeping at night. She has not tried any preventive medication. Takes ibuprofen 400 mg multiple times a day. Has not tried any other abortive medications. She does use Flexeril at night to help her sleep.    9/16/20  Patient returns today.  Reports that her headaches have worsened since she was last seen.  She was last seen in February 2019.  She reports that she has not identified any triggers on why the headaches have gotten worse.  She previously was prescribed nortriptyline by me.  She was switched to amitriptyline and then propanolol and Topamax by her primary care provider.  Without any benefit with these medications.  She further has tried sumatriptan and Maxalt without any benefit.  Reports not trying any other abortive medications.  Currently she is taking high doses of Tylenol and ibuprofen without any benefit.  Headaches happen almost every day.  She has more than 15 headache days a month.  Headaches generally wake her up in the middle of the night.  They are more of a pounding headache.  They generally come from the shoulder and neck and then move up and involve the whole head.  There is associated photophobia phonophobia.  She does feel nauseous.  Reports no triggers.  Sometimes she can have good morning and then the headache comes on later in the day.  She is tried increasing her fluid intake as well as getting more sleep without any benefit.    Previous history is reviewed and this is unchanged.    PAST MEDICAL/SURGICAL HISTORY  Past Medical History:   Diagnosis Date     Depressive disorder      Endometriosis      Ganglion cyst of left groin      Head injury      Major  "depression      Migraines      Patient Active Problem List   Diagnosis     Anxiety state     Cervical radiculopathy     Endometriosis     Left ovarian cyst     Major depression, recurrent (H)     Pelvic pain     Precordial pain     Tension-type headache     S/P partial hysterectomy   Significant for migraines and depression      FAMILY HISTORY  Family History   Problem Relation Age of Onset     Brain Hemorrhage Maternal Grandmother      Brain Hemorrhage Paternal Grandmother      Diabetes Paternal Grandmother    Reviewed and noncontributory      SOCIAL HISTORY  Social History     Tobacco Use     Smoking status: Former Smoker     Smokeless tobacco: Former User   Substance Use Topics     Alcohol use: Not Currently     Drug use: Not Currently       SYSTEMS REVIEW  Twelve-system ROS was done and other than the HPI this was negative.     MEDICATIONS  nortriptyline (PAMELOR) 10 MG capsule, Take 10 mg by mouth  cefdinir (OMNICEF) 300 MG capsule,   HYDROcodone-acetaminophen (NORCO) 5-325 MG tablet, TK 1 T PO  Q 6 H PRN P  naproxen (NAPROSYN) 500 MG tablet,     No current facility-administered medications on file prior to visit.        PHYSICAL EXAMINATION  VITALS: Ht 1.626 m (5' 4\")   Wt 55.8 kg (123 lb)   BMI 21.11 kg/m     PHYSICAL EXAM  Exam was limited due to video encounter.    Vitals-Unable to do on video  GENERAL -Health appearing, No apparent distress  EYES- No scleral icterus, no eyelid droop, Pupils symmetric  HEENT - Normocephalic, atraumatic, Hearing grossly intact; Oral mucosa moist and pink in color. External Ears and nose intact.   Neck - soft/flexible with normal ROM on visual inspection.  PULM - Good spontaneous respiratory effort;  CV- No edema on visual inspection  MSK- Gait - see Neuro section; Strength and tone- see Neuro section; Range of motion grossly intact.  Psych- Normal mood and affect. Good judgment and insight.     Neurological  Mental status - Patient is awake and oriented. Attention span is " normal. Language is fluent and follows commands appropriately.   Cranial nerves - Pupils are symmetric; EOMI, NLF symmetric  Motor - There is no pronator drift. Antigravity in all 4 ext.  Tone - No evidence of rigidity on visual inspection. No tremor.  Reflexes - Unable to do on video  Sensation - Unable to do on Video  Coordination - Finger to nose without dysmetria.   Gait and station - Romberg is negative. Gait is steady    PREVIOUS EXAM  GENERAL: Healthy appearing, alert, no acute distress, normal habitus.  CARDIOVASCULAR: Ext - warm and well perfused.  EYES: Funduscopic exam does not reveal any papilledema.   NEUROLOGICAL: Patient is awake and oriented to self, place and time. Attention span is normal. Immediate and delayed memory is normal. Language is fluent and follows commands appropriately. Appropriate fund of knowledge. Cranial nerves 2-12 are intact. There is no pronator drift. Motor exam shows 5/5 strength in all extremities. Tone is symmetric bilaterally in upper and lower extremities. Reflexes are symmetric and 2+ in upper extremities and lower extremities. Sensory exam is grossly intact to light touch, pin prick and vibration. Finger to nose and heel to shin is without dysmetria. Romberg is negative. Gait is normal and the patient is able to do tandem walk and walk on toes and heels.    DIAGNOSTICS  MRI  CONCLUSION:   1. No acute intracranial finding. No evidence for recent ischemia, intracranial hemorrhage, or mass.  2. Slight inferior cerebellar tonsillar ectopia. No Chiari malformation by radiographic criteria.    OUTSIDE RECORDS  Outside referral notes were reviewed and pertinent information has been summarized:-Patient is been treating her headaches with her primary care doctor.  She has tried Topamax, amitriptyline, propanolol without any benefit.  Maxalt sumatriptan also been tried.  She was referred to neurology for Botox injections.    IMPRESSION/REPORT/PLAN  Intractable chronic migraine  "without aura and without status migrainosus  Insomnia  Neck pain    This is a 34 year old female chronic migraines, insomnia and neck pain related to the neck fusion.  At this point patient's headaches have worsened.  We discussed treatment options for prevention of headaches.  She is interested in Botox and could look into that for her.  Furthermore we will prescribe Relpax for abortive therapy see if that works.  Encouraged her not to overuse abortive medications as that leads to rebound headaches.  I will see her back once the Botox has been approved.    -     eletriptan (RELPAX) 40 MG tablet; Take 1 tablet (40 mg) by mouth at onset of headache for migraine May repeat in 2 hours. Max 2 tablets/24 hours.    Over 25 minutes were spent coordinating the care for the patient today.  More than 50% of the time was spent counseling, educating the patient.    Botox PA:-  \" The patient has a diagnosis of chronic migraines. She has more than 15 headache days a month with each headache lasting at least 4 hours despite use of triptans. Her headaches have been there for over 6 months. She has been screened for medication overuse headaches and she does not have that. She has tried amitriptyline, Topamax, Propranolol for 3 months without any significant benefit. I would request that the Botox be approved for her. \"    Inder Urban MD  Neurologist  [facility]  Tel:- [phone]    This note was dictated using voice recognition software.  Any grammatical or context distortions are unintentional and inherent to the software.        Again, thank you for allowing me to participate in the care of your patient.        Sincerely,        Inder Urban MD    "

## 2020-09-25 ENCOUNTER — RECORDS - HEALTHEAST (OUTPATIENT)
Dept: ADMINISTRATIVE | Facility: OTHER | Age: 35
End: 2020-09-25

## 2020-10-07 ENCOUNTER — VIRTUAL VISIT (OUTPATIENT)
Dept: FAMILY MEDICINE | Facility: OTHER | Age: 35
End: 2020-10-07
Payer: COMMERCIAL

## 2020-10-07 PROCEDURE — 99421 OL DIG E/M SVC 5-10 MIN: CPT | Performed by: PHYSICIAN ASSISTANT

## 2020-10-07 NOTE — PROGRESS NOTES
"Date: 10/07/2020 10:33:48  Clinician: Vanessa Brito  Clinician NPI: [id number]  Patient: MATT RAINES  Patient : 1985  Patient Address: [address] [place] [address]  Patient Phone: [phone]  Visit Protocol: URI  Patient Summary:  MATT is a 34 year old ( : 1985 ) female who initiated a [facility] Visit for COVID-19 (Coronavirus) evaluation and screening. When asked the question \"Please sign me up to receive news, health information and promotions from [facility].\", MATT responded \"Yes\".    MATT states her symptoms started suddenly 7-9 days ago.   Her symptoms consist of a headache, wheezing, a cough, nasal congestion, rhinitis, nausea, myalgia, malaise, and diarrhea. She is experiencing mild difficulty breathing with activities but can speak normally in full sentences.   Symptom details     Nasal secretions: The color of her mucus is blood-tinged, clear, and yellow.    Cough: MATT coughs a few times an hour and her cough is more bothersome at night. Phlegm comes into her throat when she coughs. She does not believe her cough is caused by post-nasal drip. The color of the phlegm is clear and yellow.     Wheezing: MATT has been diagnosed with asthma. Additional wheezing details as reported by the patient (free text): I can hear a wheezing but it's not terribly.  My chest is tight       Headache: She states the headache is moderate (4-6 on a 10 point pain scale).      MATT denies having ear pain, fever, enlarged lymph nodes, anosmia, vomiting, facial pain or pressure, chills, sore throat, teeth pain, and ageusia. She also denies double sickening (worsening symptoms after initial improvement), having a sinus infection within the past year, taking antibiotic medication in the past month, and having recent facial or sinus surgery in the past 60 days.   Precipitating events  She has not recently been exposed to someone with influenza. MATT has not been in close contact with any " high risk individuals.   Pertinent COVID-19 (Coronavirus) information  In the past 14 days, MATT has not worked in a congregate living setting.   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   MATT also has not lived in a congregate living setting in the past 14 days. She lives with a healthcare worker.   MATT has not had a close contact with a laboratory-confirmed COVID-19 patient within 14 days of symptom onset.   Since December 2019, MATT and has had upper respiratory infection (URI) or influenza-like illness. Has not been diagnosed with lab-confirmed COVID-19 test      Date(s) of previous URI or influenza-like illness (free-text): 9-     Symptoms MATT experienced during previous URI or influenza-like illness as reported by the patient (free-text): Stuffy nose,  stomach ache, shortness of breath,  fatigue.        Pertinent medical history  MATT does not get yeast infections when she takes antibiotics.   MATT needs a return to work/school note.   Weight: 122 lbs   MATT does not smoke or use smokeless tobacco.   She denies pregnancy and denies breastfeeding. She does not menstruate.   Weight: 122 lbs    MEDICATIONS: Zofran oral, ALLERGIES: tramadol, Toradol, venlafaxine  Clinician Response:  Dear MATT,  Based on the information provided, you have viral bronchitis, also known as a chest cold. This is a cough that occurs when a cold or other virus settles into your chest. The cough may be dry or you could notice you are coughing up some phlegm.  It is not unusual for a cough to last 3 weeks or more. Treatment focuses on controlling your symptoms as much as possible while you recover.  Medication information  Because you have a  viral infection, antibiotics will not help you get better. Treating a viral infection with antibiotics could actually make you feel worse.  Unless you are allergic to the over-the-counter medication(s) below, I recommend using:     Dextromethorphan (Robitussin DM or store brand). This medication is a cough suppressant that works by decreasing the feeling of needing to cough. Please follow the instructions on the package.   Over-the-counter medications do not require a prescription. Ask the pharmacist if you have any questions.  Self care  Steps you can take to be as comfortable as possible:     Rest.    Drink plenty of fluids.    Take a warm shower to loosen congestion    Use a cool-mist humidifier.    Take a spoonful of honey to reduce your cough.     When to seek care  Please be seen in a clinic or urgent care if any of the following occur:   New symptoms develop, or symptoms become worse   Call ahead before going to the clinic or urgent care.  Additional treatment plan    Your symptoms show that you may have coronavirus (COVID-19). This illness can cause fever, cough and trouble breathing. Many people get a mild case and get better on their own. Some people can get very sick.  Based on the symptoms you have shared, I would like you to be re-checked in 2 to 3 days. Please call your family clinic to set up a video or phone visit.  Will I be tested for COVID-19?  We would like to test you for this virus.   Please call [phone] to schedule your visit. Explain that you were referred by [facility] to have a COVID-19 test. Be ready to [facility].   The following will serve as your written order for this COVID Test, ordered by me, for the indication of suspected COVID [Z20.828]: The test will be ordered in [employer], our electronic health record, after you are scheduled. It will show as ordered and authorized by Wu Klein MD.  Order: COVID-19 (Coronavirus) PCR for SYMPTOMATIC testing from [facility].   "1.When it's time for your COVID test:   Stay at least 6 feet away from others. (If someone will drive you to your test, stay in the backseat, as far away from the  as you can.)   Cover your mouth and nose with a mask, tissue or washcloth.  Go straight to the testing site. Don't make any stops on the way there or back.      2.Starting now: Stay home and away from others (self-isolate) until:   You've had no fever---and no medicine that reduces fever---for one full day (24 hours). And...   Your other symptoms have gotten better. For example, your cough or breathing has improved. And...   At least 10 days have passed since your symptoms started.       During this time, don't leave the house except for testing or medical care.   Stay in your own room, even for meals. Use your own bathroom if you can.   Stay away from others in your home. No hugging, kissing or shaking hands. No visitors.  Don't go to work, school or anywhere else.    Clean \"high touch\" surfaces often (doorknobs, counters, handles, etc.). Use a household cleaning spray or wipes. You'll find a full list of  on the EPA website: www.epa.gov/pesticide-registration/list-n-disinfectants-use-against-sars-cov-2.   Cover your mouth and nose with a mask, tissue or washcloth to avoid spreading germs.  Wash your hands and face often. Use soap and water.  Caregivers in these groups are at risk for severe illness due to COVID-19:  o People 65 years and older  o People who live in a nursing home or long-term care facility  o People with chronic disease (lung, heart, cancer, diabetes, kidney, liver, immunologic)   o People who have a weakened immune system, including those who:   Are in cancer treatment  Take medicine that weakens the immune system, such as corticosteroids  Had a bone marrow or organ transplant  Have an immune deficiency  Have poorly controlled HIV or AIDS  Are obese (body mass index of 40 or higher)  Smoke regularly   o Caregivers should " wear gloves while washing dishes, handling laundry and cleaning bedrooms and bathrooms.  o Use caution when washing and drying laundry: Don't shake dirty laundry, and use the warmest water setting that you can.  o For more tips, go to www.cdc.gov/coronavirus/2019-ncov/downloads/10Things.pdf.      How can I take care of myself?   Get lots of rest. Drink extra fluids (unless a doctor has told you not to)   Take Tylenol (acetaminophen) for fever or pain. If you have liver or kidney problems, ask your family doctor if it's okay to take Tylenol.   Adults can take either:    650 mg (two 325 mg pills) every 4 to 6 hours, or...   1,000 mg (two 500 mg pills) every 8 hours as needed.    Note: Don't take more than 3,000 mg in one day. Acetaminophen is found in many medicines (both prescribed and over-the-counter medicines). Read all labels to be sure you don't take too much.   For children, check the Tylenol bottle for the right dose. The dose is based on the child's age or weight.    If you have other health problems (like cancer, heart failure, an organ transplant or severe kidney disease): Call your specialty clinic if you don't feel better in the next 2 days.       Know when to call 911. Emergency warning signs include:    Trouble breathing or shortness of breath Pain or pressure in the chest that doesn't go away Feeling confused like you haven't felt before, or not being able to wake up Bluish-colored lips or face  Where can I get more information?   [employer] -- About COVID-19: [email or web address]   CDC -- What to Do If You're Sick: www.cdc.gov/coronavirus/2019-ncov/about/steps-when-sick.html   CDC -- Ending Home Isolation: www.cdc.gov/coronavirus/2019-ncov/hcp/disposition-in-home-patients.html   CDC -- Caring for Someone: www.cdc.gov/coronavirus/2019-ncov/if-you-are-sick/care-for-someone.html   [facility] -- Interim Guidance for Hospital Discharge to Home:  www.health.[facility].[place].us/diseases/coronavirus/hcp/hospdischarge.pdf   [facility] clinical trials (COVID-19 research studies): clinicalaffairs.[facility]/umn-clinical-trials    Below are the COVID-19 hotlines at the [facility] ([facility]). Interpreters are available.    For health questions: Call 792-777-8002 or 1-459.688.2837 (7 a.m. to 7 p.m.) For questions about schools and childcare: Call 989-713-4630 or 1-913.888.3415 (7 a.m. to 7 p.m.)         Diagnosis: Cough  Diagnosis ICD: R05

## 2020-10-15 ENCOUNTER — VIRTUAL VISIT (OUTPATIENT)
Dept: FAMILY MEDICINE | Facility: OTHER | Age: 35
End: 2020-10-15
Payer: COMMERCIAL

## 2020-10-15 PROCEDURE — 99421 OL DIG E/M SVC 5-10 MIN: CPT | Performed by: PHYSICIAN ASSISTANT

## 2020-10-15 NOTE — PROGRESS NOTES
"Date: 10/15/2020 17:43:47  Clinician: Niranjan Bingham  Clinician NPI: [id number]  Patient: MATT RAINES  Patient : 1985  Patient Address: [address] [place] [address]  Patient Phone: [phone]  Visit Protocol: URI  Patient Summary:  MATT is a 34 year old ( : 1985 ) female who initiated a [facility] Visit for cold, sinus infection, or influenza. When asked the question \"Please sign me up to receive news, health information and promotions from [facility].\", MATT responded \"No\".    MATT states her symptoms started gradually 2-3 weeks ago.   Her symptoms consist of a headache, a cough, vomiting, nausea, myalgia, malaise, and diarrhea.   Symptom details     Cough: MATT coughs a few times an hour and her cough is not more bothersome at night. Phlegm does not come into her throat when she coughs. She does not believe her cough is caused by post-nasal drip.     Headache: She states the headache is moderate (4-6 on a 10 point pain scale).      MATT denies having ear pain, wheezing, fever, enlarged lymph nodes, nasal congestion, anosmia, rhinitis, facial pain or pressure, chills, sore throat, teeth pain, and ageusia. She also denies double sickening (worsening symptoms after initial improvement), taking antibiotic medication in the past month, and having recent facial or sinus surgery in the past 60 days. She is not experiencing dyspnea.   Precipitating events  She has not recently been exposed to someone with influenza. MATT has not been in close contact with any high risk individuals.   Pertinent COVID-19 (Coronavirus) information  In the past 14 days, MATT has not worked in a congregate living setting.   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 She lives with a healthcare worker.   MATT has not had a close contact with a laboratory-confirmed COVID-19 patient within 14 days of symptom onset.   Since December 2019, MATT and has had upper respiratory infection (URI) or influenza-like illness. Has not been diagnosed with lab-confirmed COVID-19 test      Date(s) of previous URI or influenza-like illness (free-text): October 1st to October 10th     Symptoms MATT experienced during previous URI or influenza-like illness as reported by the patient (free-text): Cough,  stuffy nose, migraine, nausea, vomiting, body aches.        Pertinent medical history  MATT does not get yeast infections when she takes antibiotics.   MATT needs a return to work/school note.   Weight: 123 lbs   MATT does not smoke or use smokeless tobacco.   She denies pregnancy and denies breastfeeding. She does not menstruate.   Additional information as reported by the patient (free text): I started out with a cough and runny nose and migraine.  No runny nose but migraine has gotten worse.   Weight: 123 lbs    MEDICATIONS: Zofran oral, ALLERGIES: venlafaxine, Toradol, tramadol  Clinician Response:  Dear MATT,   Your symptoms show that you may have coronavirus (COVID-19). This illness can cause fever, cough and trouble breathing. Many people get a mild case and get better on their own. Some people can get very sick.  What should I do?  We would like to test you for this virus.   1. Please call [phone] to schedule your visit. Explain that [facility]. Be ready to share your [facility] visit ID number.  The following will serve as your written order for this COVID Test, ordered by me, for the indication of suspected COVID [Z20.828]: The test will be ordered in [employer], our electronic health record, after you are scheduled. It will show as ordered and authorized by Wu Klein MD.  Order: COVID-19 (Coronavirus)  "PCR for SYMPTOMATIC testing from [facility].      2. When it's time for your COVID test:  Stay at least 6 feet away from others. (If someone will drive you to your test, stay in the backseat, as far away from the  as you can.)   Cover your mouth and nose with a mask, tissue or washcloth.  Go straight to the testing site. Don't make any stops on the way there or back.      3.Starting now: Stay home and away from others (self-isolate) until:   You've had no fever---and no medicine that reduces fever---for one full day (24 hours). And...   Your other symptoms have gotten better. For example, your cough or breathing has improved. And...   At least 10 days have passed since your symptoms started.       During this time, don't leave the house except for testing or medical care.   Stay in your own room, even for meals. Use your own bathroom if you can.   Stay away from others in your home. No hugging, kissing or shaking hands. No visitors.  Don't go to work, school or anywhere else.    Clean \"high touch\" surfaces often (doorknobs, counters, handles, etc.). Use a household cleaning spray or wipes. You'll find a full list of  on the EPA website: www.epa.gov/pesticide-registration/list-n-disinfectants-use-against-sars-cov-2.   Cover your mouth and nose with a mask, tissue or washcloth to avoid spreading germs.  Wash your hands and face often. Use soap and water.  Caregivers in these groups are at risk for severe illness due to COVID-19:  o People 65 years and older  o People who live in a nursing home or long-term care facility  o People with chronic disease (lung, heart, cancer, diabetes, kidney, liver, immunologic)  o People who have a weakened immune system, including those who:   Are in cancer treatment  Take medicine that weakens the immune system, such as corticosteroids  Had a bone marrow or organ transplant  Have an immune deficiency  Have poorly controlled HIV or AIDS  Are obese (body mass index of 40 or " higher)  Smoke regularly   o Caregivers should wear gloves while washing dishes, handling laundry and cleaning bedrooms and bathrooms.  o Use caution when washing and drying laundry: Don't shake dirty laundry, and use the warmest water setting that you can.  o For more tips, go to www.cdc.gov/coronavirus/2019-ncov/downloads/10Things.pdf.    4.Sign up for [employer]. We know it's scary to hear that you might have COVID-19. We want to track your symptoms to make sure you're okay over the next 2 weeks. Please look for an email from [employer]---this is a free, online program that we'll use to keep in touch. To sign up, follow the link in the email. Learn more at [email or web address]  How can I take care of myself?   Get lots of rest. Drink extra fluids (unless a doctor has told you not to).   Take Tylenol (acetaminophen) for fever or pain. If you have liver or kidney problems, ask your family doctor if it's okay to take Tylenol.   Adults can take either:    650 mg (two 325 mg pills) every 4 to 6 hours, or...   1,000 mg (two 500 mg pills) every 8 hours as needed.    Note: Don't take more than 3,000 mg in one day. Acetaminophen is found in many medicines (both prescribed and over-the-counter medicines). Read all labels to be sure you don't take too much.   For children, check the Tylenol bottle for the right dose. The dose is based on the child's age or weight.    If you have other health problems (like cancer, heart failure, an organ transplant or severe kidney disease): Call your specialty clinic if you don't feel better in the next 2 days.       Know when to call 911. Emergency warning signs include:    Trouble breathing or shortness of breath Pain or pressure in the chest that doesn't go away Feeling confused like you haven't felt before, or not being able to wake up Bluish-colored lips or face.  Where can I get more information?    [employer] -- About COVID-19: [email or web address]    CDC -- What to Do If You're Sick: www.cdc.gov/coronavirus/2019-ncov/about/steps-when-sick.html   CDC -- Ending Home Isolation: www.cdc.gov/coronavirus/2019-ncov/hcp/disposition-in-home-patients.html   CDC -- Caring for Someone: www.cdc.gov/coronavirus/2019-ncov/if-you-are-sick/care-for-someone.html   [facility] -- Interim Guidance for Hospital Discharge to Home: www.health.[facility].mn./diseases/coronavirus/hcp/hospdischarge.pdf   [facility] clinical trials (COVID-19 research studies): clinicalaffairs.[facility]-clinical-trials    Below are the COVID-19 hotlines at the [place] [facility] ([facility]). Interpreters are available.    For health questions: Call 754-542-1932 or 1-821.837.1309 (7 a.m. to 7 p.m.) For questions about schools and childcare: Call 248-345-2149 or 1-975.824.5345 (7 a.m. to 7 p.m.)    Diagnosis: Contact with and (suspected) exposure to other viral communicable diseases  Diagnosis ICD: Z20.828  Additional Clinician Notes:   If your symptoms are not improving or worsen, please go to one of our urgent care locations for evaluation and possible lab work.  Please see your regular doctor for any return to work or school documentation once COVID testing is complete and results are available.

## 2020-10-21 ENCOUNTER — RECORDS - HEALTHEAST (OUTPATIENT)
Dept: ADMINISTRATIVE | Facility: OTHER | Age: 35
End: 2020-10-21

## 2020-10-28 ENCOUNTER — VIRTUAL VISIT (OUTPATIENT)
Dept: FAMILY MEDICINE | Facility: OTHER | Age: 35
End: 2020-10-28

## 2020-10-28 ENCOUNTER — OFFICE VISIT - HEALTHEAST (OUTPATIENT)
Dept: FAMILY MEDICINE | Facility: CLINIC | Age: 35
End: 2020-10-28

## 2020-10-28 DIAGNOSIS — N76.0 BV (BACTERIAL VAGINOSIS): ICD-10-CM

## 2020-10-28 DIAGNOSIS — N89.8 VAGINAL ODOR: ICD-10-CM

## 2020-10-28 DIAGNOSIS — B96.89 BV (BACTERIAL VAGINOSIS): ICD-10-CM

## 2020-10-28 LAB
CLUE CELLS: ABNORMAL
TRICHOMONAS, WET PREP: ABNORMAL
YEAST, WET PREP: ABNORMAL

## 2020-10-28 NOTE — PROGRESS NOTES
"Date: 10/28/2020 08:02:16  Clinician: Vanessa Brito  Clinician NPI: [id number]  Patient: MATT RAINES  Patient : 1985  Patient Address: [address] [place] [address]  Patient Phone: [phone]  Visit Protocol: Yeast infection  Patient Summary:  MATT is a 34 year old ( : 1985 ) female who initiated a [facility] Visit for a presumed vaginal yeast infection. When asked the question \"Please sign me up to receive news, health information and promotions from [facility].\", MATT responded \"No\".    MATT began noticing vaginal irritation today.   She denies having vaginal pruritus, open sores, vaginal discharge, vaginal burning, abdominal pain, and blisters. She also denies feeling feverish.   She has not tried to treat her current symptoms with any medication.   Precipitating events  MATT denies having a sexually transmitted disease.   Pertinent medical history  MATT has a history of vaginal yeast infections. She has had zero (0) occurrences in the past year and the symptoms are NOT similar to previous yeast infections. She has not used fluconazole (Diflucan) to treat previous yeast infections.   She prefers a pill. She denies taking antibiotics in the past 2 weeks.   She does NOT smoke or use smokeless tobacco.   She denies pregnancy and denies breastfeeding. She does not menstruate.   Additional information as reported by the patient (free text): I believe I have bacteria vaginosis there is an odor and no itching.  I have gotten alot of bacteria infections.      MEDICATIONS: Zofran oral, ALLERGIES: venlafaxine, Toradol, tramadol  Clinician Response:  Dear MATT,  Your health is our priority. To determine the most appropriate care for you, I would like you to be seen in person to further discuss your health history and symptoms.  You will not be charged for this [facility] Visit. Thank you for trusting us with your care.   Diagnosis: Refer for additional evaluation  Diagnosis ICD: R69  "

## 2020-10-29 ENCOUNTER — OFFICE VISIT (OUTPATIENT)
Dept: NEUROLOGY | Facility: CLINIC | Age: 35
End: 2020-10-29
Payer: COMMERCIAL

## 2020-10-29 ENCOUNTER — RECORDS - HEALTHEAST (OUTPATIENT)
Dept: ADMINISTRATIVE | Facility: OTHER | Age: 35
End: 2020-10-29

## 2020-10-29 VITALS
HEART RATE: 76 BPM | BODY MASS INDEX: 21 KG/M2 | HEIGHT: 64 IN | WEIGHT: 123 LBS | SYSTOLIC BLOOD PRESSURE: 105 MMHG | DIASTOLIC BLOOD PRESSURE: 76 MMHG

## 2020-10-29 DIAGNOSIS — G43.719 INTRACTABLE CHRONIC MIGRAINE WITHOUT AURA AND WITHOUT STATUS MIGRAINOSUS: Primary | ICD-10-CM

## 2020-10-29 DIAGNOSIS — G47.00 INSOMNIA, UNSPECIFIED TYPE: ICD-10-CM

## 2020-10-29 DIAGNOSIS — M54.2 NECK PAIN: ICD-10-CM

## 2020-10-29 PROCEDURE — 99214 OFFICE O/P EST MOD 30 MIN: CPT | Mod: 25 | Performed by: PSYCHIATRY & NEUROLOGY

## 2020-10-29 PROCEDURE — 64615 CHEMODENERV MUSC MIGRAINE: CPT | Performed by: PSYCHIATRY & NEUROLOGY

## 2020-10-29 RX ORDER — CYCLOBENZAPRINE HCL 5 MG
5-10 TABLET ORAL
COMMUNITY
Start: 2018-12-21

## 2020-10-29 RX ORDER — IBUPROFEN 200 MG
600 TABLET ORAL
COMMUNITY
End: 2021-08-17

## 2020-10-29 ASSESSMENT — MIFFLIN-ST. JEOR: SCORE: 1237.92

## 2020-10-29 NOTE — PROCEDURES
NEUROLOGY PROCEDURE NOTE  Oct 29, 2020      Chronic migraine Botox injection    CONSENT: The procedure was explained to the patient. The risks and benefits of the procedure and the alternatives were discussed with the patient. The patient was given an opportunity to ask any questions she had about the procedure. A verbal consent was obtained from the patient. A written consent is available in the chart.    PROCEDURE SUMMARY:   The following muscles were identified after palpating for landmarks and the overlying skin was cleansed with alcohol. These muscles were then injected using a 30 guage- half  inch needle with the following doses of onabotulinumtoxin A. A 5 unit/ 0.1 ml dilution was used for the injections. A 2 x 100 unit vial was used.    1. Corrugators 5 units each side.  2. Procerus 5 units.  3. Frontalis 10 units each side.  4. Temporalis 20 units each side.  5. Occipitalis 15 units each side.  6. Paraspinals 15 units each side.  7. Trapezius 25 units each side.    Units Injected: 185 Units  Units Discarded: 15 Units  Lot Number and Expiration: [id number]; 6/2023    The patient tolerated the procedure without any immediate complaints and will call the Neurology clinic if she has any problems or side effects from the medication. The patient will follow up in the Neurology clinic as previously decided.      Inder Urban MD  Neurologist  [facility]  [phone]

## 2020-10-29 NOTE — NURSING NOTE
Chief Complaint   Patient presents with     Botox Migraine     Karyn Mckinley CMA on 10/29/2020 at 1:30 PM

## 2020-10-29 NOTE — PROGRESS NOTES
NEUROLOGY OUTPATIENT PROGRESS NOTE   Oct 29, 2020     CHIEF COMPLAINT/REASON FOR VISIT/REASON FOR CONSULT  Patient presents with:  Botox Migraine    REASON FOR CONSULTATION- Worsening headaches.    HISTORY OF PRESENT ILLNESS  Vicki Pierson is a 34 year old female seen for headaches. She reports that she has been having headaches for over 10 years. Headaches are generally starting in the back of the head. Sometimes it can come in the shoulder blade and then move up. She previously had a C6-C7 fusion because of the headaches but that made things worse. Headaches start in the neck or the shoulder blades and then move forward to the frontal region. The bilateral nature. The throbbing in nature with sharpness. There is photophobia and phonophobia. There is no significant visual auras. She denies any triggers. She has difficulty sleeping at night. She has not tried any preventive medication. Takes ibuprofen 400 mg multiple times a day. Has not tried any other abortive medications. She does use Flexeril at night to help her sleep.    9/16/20  Patient returns today.  Reports that her headaches have worsened since she was last seen.  She was last seen in February 2019.  She reports that she has not identified any triggers on why the headaches have gotten worse.  She previously was prescribed nortriptyline by me.  She was switched to amitriptyline and then propanolol and Topamax by her primary care provider.  Without any benefit with these medications.  She further has tried sumatriptan and Maxalt without any benefit.  Reports not trying any other abortive medications.  Currently she is taking high doses of Tylenol and ibuprofen without any benefit.  Headaches happen almost every day.  She has more than 15 headache days a month.  Headaches generally wake her up in the middle of the night.  They are more of a pounding headache.  They generally come from the shoulder and neck and then move up and involve the whole head.   There is associated photophobia phonophobia.  She does feel nauseous.  Reports no triggers.  Sometimes she can have good morning and then the headache comes on later in the day.  She is tried increasing her fluid intake as well as getting more sleep without any benefit.    10/29/20  Patient returns today.  Reports that she continues to have 15 headache days a month.  Headaches every day.  These are unchanged in nature.  Has been drinking a lot of water which has not really helped the headaches.  Denies any other triggers for the headaches.  Is having a bad headache today.  Did not try the Relpax that had prescribed last time.  She is interested in proceeding with the Botox today.  She reports that she also gets Botox for hyperhidrosis through dermatology.  This was done 2 weeks ago.  Generally she would get 100 units of Botox but has recently undergone a 50 units.  [facility]  No change in the insomnia.  Patient continues to have neck pain related to the neck fusion.  Feels that that might be causing some of her headaches.    Previous history is reviewed and this is unchanged.    PAST MEDICAL/SURGICAL HISTORY  Past Medical History:   Diagnosis Date     Depressive disorder      Endometriosis      Ganglion cyst of left groin      Head injury      Major depression      Migraines      Patient Active Problem List   Diagnosis     Anxiety state     Cervical radiculopathy     Endometriosis     Left ovarian cyst     Major depression, recurrent (H)     Pelvic pain     Precordial pain     Tension-type headache     S/P partial hysterectomy   Significant for migraines and depression      FAMILY HISTORY  Family History   Problem Relation Age of Onset     Brain Hemorrhage Maternal Grandmother      Brain Hemorrhage Paternal Grandmother      Diabetes Paternal Grandmother    Reviewed and noncontributory      SOCIAL HISTORY  Social History     Tobacco Use     Smoking status: Former Smoker      "Smokeless tobacco: Former User   Substance Use Topics     Alcohol use: Not Currently     Drug use: Not Currently       SYSTEMS REVIEW  Twelve-system ROS was done and other than the HPI this was negative.     MEDICATIONS       cyclobenzaprine (FLEXERIL) 5 MG tablet, Take 5-10 mg by mouth       eletriptan (RELPAX) 40 MG tablet, Take 1 tablet (40 mg) by mouth at onset of headache for migraine May repeat in 2 hours. Max 2 tablets/24 hours.       ibuprofen (ADVIL/MOTRIN) 200 MG tablet, Take 600 mg by mouth    No current facility-administered medications on file prior to visit.        PHYSICAL EXAMINATION  VITALS: /76   Pulse 76   Ht 1.626 m (5' 4\")   Wt 55.8 kg (123 lb)   BMI 21.11 kg/m     GENERAL: Healthy appearing, alert, no acute distress, normal habitus.  CARDIOVASCULAR: Ext - warm and well perfused.  NEUROLOGICAL: Patient is awake and oriented. Attention span is normal.  Memory grossly intact.  Language is fluent and follows commands appropriately. Appropriate fund of knowledge. Cranial nerves 2-12 are intact. There is no pronator drift. Motor exam shows 5/5 strength in all extremities. Tone is symmetric bilaterally in upper and lower extremities.  Finger to nose is without dysmetria.  Gait is normal.  Tandem is intact.    DIAGNOSTICS  MRI  CONCLUSION:   1. No acute intracranial finding. No evidence for recent ischemia, intracranial hemorrhage, or mass.  2. Slight inferior cerebellar tonsillar ectopia. No Chiari malformation by radiographic criteria.    OUTSIDE RECORDS  Outside referral notes were reviewed and pertinent information has been summarized:-Patient is been treating her headaches with her primary care doctor.  She has tried Topamax, amitriptyline, propanolol without any benefit.  Maxalt sumatriptan also been tried.  She was referred to neurology for Botox injections.    IMPRESSION/REPORT/PLAN  Intractable chronic migraine without aura and without status migrainosus  Insomnia  Neck " pain  Hyperhidrosis    This is a 34 year old female chronic migraines, insomnia and neck pain related to the neck fusion.  She is failed multiple treatment options and will proceed with Botox today.  Discussed Botox paradigm with the patient.  Will use Relpax for abortive therapy.  I will see her back in 2 months.  She is getting Botox injections for hyperhidrosis these will need to be coordinated at the same time as Botox for migraines so she does not develop resistance to the Botox.  Could do them at the same time as the migraine headache injections.  We will discuss this further during the next visit.  Encouraged her to keep a log of her headaches to monitor for effectiveness of the Botox.    -     eletriptan (RELPAX) 40 MG tablet; Take 1 tablet (40 mg) by mouth at onset of headache for migraine May repeat in 2 hours. Max 2 tablets/24 hours.    Inder Urban MD  Neurologist  [facility]  Tel:- [phone]    This note was dictated using voice recognition software.  Any grammatical or context distortions are unintentional and inherent to the software.

## 2020-10-29 NOTE — LETTER
10/29/2020         RE: Vicki Pierson  [address] [address]        Dear Colleague,    Thank you for referring your patient, Vicki Pierson, to the [facility] [place]. Please see a copy of my visit note below.    NEUROLOGY OUTPATIENT PROGRESS NOTE   Oct 29, 2020     CHIEF COMPLAINT/REASON FOR VISIT/REASON FOR CONSULT  Patient presents with:  Botox Migraine    REASON FOR CONSULTATION- Worsening headaches.    HISTORY OF PRESENT ILLNESS  Vicki Pierson is a 34 year old female seen for headaches. She reports that she has been having headaches for over 10 years. Headaches are generally starting in the back of the head. Sometimes it can come in the shoulder blade and then move up. She previously had a C6-C7 fusion because of the headaches but that made things worse. Headaches start in the neck or the shoulder blades and then move forward to the frontal region. The bilateral nature. The throbbing in nature with sharpness. There is photophobia and phonophobia. There is no significant visual auras. She denies any triggers. She has difficulty sleeping at night. She has not tried any preventive medication. Takes ibuprofen 400 mg multiple times a day. Has not tried any other abortive medications. She does use Flexeril at night to help her sleep.    9/16/20  Patient returns today.  Reports that her headaches have worsened since she was last seen.  She was last seen in February 2019.  She reports that she has not identified any triggers on why the headaches have gotten worse.  She previously was prescribed nortriptyline by me.  She was switched to amitriptyline and then propanolol and Topamax by her primary care provider.  Without any benefit with these medications.  She further has tried sumatriptan and Maxalt without any benefit.  Reports not trying any other abortive medications.  Currently she is taking high doses of Tylenol and ibuprofen without any benefit.   Headaches happen almost every day.  She has more than 15 headache days a month.  Headaches generally wake her up in the middle of the night.  They are more of a pounding headache.  They generally come from the shoulder and neck and then move up and involve the whole head.  There is associated photophobia phonophobia.  She does feel nauseous.  Reports no triggers.  Sometimes she can have good morning and then the headache comes on later in the day.  She is tried increasing her fluid intake as well as getting more sleep without any benefit.    10/29/20  Patient returns today.  Reports that she continues to have 15 headache days a month.  Headaches every day.  These are unchanged in nature.  Has been drinking a lot of water which has not really helped the headaches.  Denies any other triggers for the headaches.  Is having a bad headache today.  Did not try the Relpax that had prescribed last time.  She is interested in proceeding with the Botox today.  She reports that she also gets Botox for hyperhidrosis through dermatology.  This was done 2 weeks ago.  Generally she would get 100 units of Botox but has recently undergone a 50 units.  [facility]  No change in the insomnia.  Patient continues to have neck pain related to the neck fusion.  Feels that that might be causing some of her headaches.    Previous history is reviewed and this is unchanged.    PAST MEDICAL/SURGICAL HISTORY  Past Medical History:   Diagnosis Date     Depressive disorder      Endometriosis      Ganglion cyst of left groin      Head injury      Major depression      Migraines      Patient Active Problem List   Diagnosis     Anxiety state     Cervical radiculopathy     Endometriosis     Left ovarian cyst     Major depression, recurrent (H)     Pelvic pain     Precordial pain     Tension-type headache     S/P partial hysterectomy   Significant for migraines and depression      FAMILY HISTORY  Family History  "  Problem Relation Age of Onset     Brain Hemorrhage Maternal Grandmother      Brain Hemorrhage Paternal Grandmother      Diabetes Paternal Grandmother    Reviewed and noncontributory      SOCIAL HISTORY  Social History     Tobacco Use     Smoking status: Former Smoker     Smokeless tobacco: Former User   Substance Use Topics     Alcohol use: Not Currently     Drug use: Not Currently       SYSTEMS REVIEW  Twelve-system ROS was done and other than the HPI this was negative.     MEDICATIONS       cyclobenzaprine (FLEXERIL) 5 MG tablet, Take 5-10 mg by mouth       eletriptan (RELPAX) 40 MG tablet, Take 1 tablet (40 mg) by mouth at onset of headache for migraine May repeat in 2 hours. Max 2 tablets/24 hours.       ibuprofen (ADVIL/MOTRIN) 200 MG tablet, Take 600 mg by mouth    No current facility-administered medications on file prior to visit.        PHYSICAL EXAMINATION  VITALS: /76   Pulse 76   Ht 1.626 m (5' 4\")   Wt 55.8 kg (123 lb)   BMI 21.11 kg/m     GENERAL: Healthy appearing, alert, no acute distress, normal habitus.  CARDIOVASCULAR: Ext - warm and well perfused.  NEUROLOGICAL: Patient is awake and oriented. Attention span is normal.  Memory grossly intact.  Language is fluent and follows commands appropriately. Appropriate fund of knowledge. Cranial nerves 2-12 are intact. There is no pronator drift. Motor exam shows 5/5 strength in all extremities. Tone is symmetric bilaterally in upper and lower extremities.  Finger to nose is without dysmetria.  Gait is normal.  Tandem is intact.    DIAGNOSTICS  MRI  CONCLUSION:   1. No acute intracranial finding. No evidence for recent ischemia, intracranial hemorrhage, or mass.  2. Slight inferior cerebellar tonsillar ectopia. No Chiari malformation by radiographic criteria.    OUTSIDE RECORDS  Outside referral notes were reviewed and pertinent information has been summarized:-Patient is been treating her headaches with her primary care doctor.  She has tried " Topamax, amitriptyline, propanolol without any benefit.  Maxalt sumatriptan also been tried.  She was referred to neurology for Botox injections.    IMPRESSION/REPORT/PLAN  Intractable chronic migraine without aura and without status migrainosus  Insomnia  Neck pain  Hyperhidrosis    This is a 34 year old female chronic migraines, insomnia and neck pain related to the neck fusion.  She is failed multiple treatment options and will proceed with Botox today.  Discussed Botox paradigm with the patient.  Will use Relpax for abortive therapy.  I will see her back in 2 months.  She is getting Botox injections for hyperhidrosis these will need to be coordinated at the same time as Botox for migraines so she does not develop resistance to the Botox.  Could do them at the same time as the migraine headache injections.  We will discuss this further during the next visit.  Encouraged her to keep a log of her headaches to monitor for effectiveness of the Botox.    -     eletriptan (RELPAX) 40 MG tablet; Take 1 tablet (40 mg) by mouth at onset of headache for migraine May repeat in 2 hours. Max 2 tablets/24 hours.    Inder Urban MD  Neurologist  [facility]  Tel:- [phone]    This note was dictated using voice recognition software.  Any grammatical or context distortions are unintentional and inherent to the software.          Again, thank you for allowing me to participate in the care of your patient.        Sincerely,        Inder Urban MD

## 2020-11-16 ENCOUNTER — VIRTUAL VISIT (OUTPATIENT)
Dept: FAMILY MEDICINE | Facility: OTHER | Age: 35
End: 2020-11-16
Payer: COMMERCIAL

## 2020-11-17 NOTE — PROGRESS NOTES
"Date: 2020 21:51:14  Clinician: Makayla Salvador  Clinician NPI: [id number]  Patient: MATT RAINES  Patient : 1985  Patient Address: [address] [place] [address]  Patient Phone: [phone]  Visit Protocol: URI  Patient Summary:  MATT is a 35 year old ( : 1985 ) female who initiated a [facility] Visit for COVID-19 (Coronavirus) evaluation and screening. When asked the question \"Please sign me up to receive news, health information and promotions from [facility].\", MATT responded \"No\".    When asked when her symptoms started, MATT reported that she does not have any symptoms.   She denies having recent facial or sinus surgery in the past 60 days.    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 In the past 14 days, MATT has not worked or volunteered at a healthcare facility or group living setting.   In the past 14 days, she also has not lived in a congregate living setting.   MATT has had a close contact with a laboratory-confirmed COVID-19 patient in the last 14 days. Date MATT was exposed to the laboratory-confirmed COVID-19 patient: 11/10/2020   Additional information about contact with COVID-19 (Coronavirus) patient as reported by the patient (free text): My father in law tested positive and lives downstairs.  He was sitting in the living room about 10 feet from me on  and got his results on the  via email from a home saliva test.   MATT is living in the same household with the COVID-19 positive patient. She was in an enclosed space for greater than 15 minutes with the COVID-19 patient.   During the encounter, only she was wearing a mask.   Since 2019, MATT has not been tested for COVID-19 and has not had upper respiratory infection or influenza-like illness.   Pertinent medical history  MATT has taken an antibiotic medication in " the past month. Antibiotic details as reported by the patient (free text): Clindamycin, BV   AMTT does not get yeast infections when she takes antibiotics.   MATT needs a return to work/school note.   Weight: 125 lbs   MATT does not smoke or use smokeless tobacco.   She denies pregnancy and denies breastfeeding. She does not menstruate.   Additional information as reported by the patient (free text): I did have a very bad headache that lasted a good 3 weeks and it just went away last week. I was also nauseated but did not throw up. My father in law lives downstairs but we do share the same laundry room and kitchen. Other than that we aren't around him much.   Weight: 125 lbs    MEDICATIONS: Zofran oral, ALLERGIES: venlafaxine, Toradol, tramadol  Clinician Response:  Dear MATT,   Based on your exposure to COVID-19 (coronavirus), we would like to test you for this virus.  1. Please call [phone] to schedule your visit. Explain that you were referred by Critical access hospital to have a COVID-19 test. Be ready to share your Critical access hospital visit ID number.  * If you need to schedule in [facility] please call [phone] or for [employer] employees please call [phone].   * If you need to schedule in the [place] area please call [phone]. [employer] employees call [phone].   The following will serve as your written order for this COVID Test, ordered by me, for the indication of suspected COVID [Z20.828]: The test will be ordered in [employer], our electronic health record, after you are scheduled. It will show as ordered and authorized by Wu Klein MD.  Order: COVID-19 (coronavirus) PCR for ASYMPTOMATIC EXPOSURE testing from Critical access hospital.   If you know you have had close contact with someone who tested positive, you should be quarantined for 14 days after this exposure. You should stay in quarantine for the14 days even if the covid test is negative, the optimal time to test after exposure is 5-7 days from the exposure  Quarantine  means   What should I do?  For safety, it's very important to follow these rules. Do this for 14 days after the date you were last exposed to the virus..  Stay home and away from others. Don't go to school or anywhere else. Generally quarantine means staying home from work but there are some exceptions to this. Please contact your workplace.   No hugging, kissing or shaking hands.  Don't let anyone visit.  Cover your mouth and nose with a mask, tissue or washcloth to avoid spreading germs.  Wash your hands and face often. Use soap and water.  What are the symptoms of COVID-19?  The most common symptoms are cough, fever and trouble breathing. Less common symptoms include headache, body aches, fatigue (feeling very tired), chills, sore throat, stuffy or runny nose, diarrhea (loose poop), loss of taste or smell, belly pain, and nausea or vomiting (feeling sick to your stomach or throwing up).  After 14 days, if you have still don't have symptoms, you likely don't have this virus.  If you develop symptoms, follow these guidelines.  If you're normally healthy: Please start another [facility] visit to report your symptoms. Go to [email or web address].  If you have a serious health problem (like cancer, heart failure, an organ transplant or kidney disease): Call your specialty clinic. Let them know that you might have COVID-19.  2. When it's time for your COVID test:  Stay at least 6 feet away from others. (If someone will drive you to your test, stay in the backseat, as far away from the  as you can.)  Cover your mouth and nose with a mask, tissue or washcloth.  Go straight to the testing site. Don't make any stops on the way there or back.  Please note  Caregivers in these groups are at risk for severe illness due to COVID-19:  o People 65 years and older  o People who live in a nursing home or long-term care facility  o People with chronic disease (lung, heart, cancer, diabetes, kidney, liver, immunologic)  o People who have a  weakened immune system, including those who:  Are in cancer treatment  Take medicine that weakens the immune system, such as corticosteroids  Had a bone marrow or organ transplant  Have an immune deficiency  Have poorly controlled HIV or AIDS  Are obese (body mass index of 40 or higher)  Smoke regularly  Where can I get more information?  [facility] -- About COVID-19: www."[email or web address]  CDC -- What to Do If You're Sick: www.cdc.gov/coronavirus/2019-ncov/about/steps-when-sick.html  CDC -- Ending Home Isolation: www.cdc.gov/coronavirus/2019-ncov/hcp/disposition-in-home-patients.html  [facility] -- Caring for Someone: www.cdc.gov/coronavirus/2019-ncov/if-you-are-sick/care-for-someone.html  [facility] -- Interim Guidance for Hospital Discharge to Home: www.[facility].mn./diseases/coronavirus/hcp/hospdischarge.pdf  [facility] clinical trials (COVID-19 research studies): clinicalaffairs.[facility]-clinical-trials  Below are the COVID-19 hotlines at the [place] [facility] ([facility]). Interpreters are available.  For health questions: Call 603-618-4839 or 1-374.933.5180 (7 a.m. to 7 p.m.)  For questions about schools and childcare: Call 280-221-4896 or 1-387.625.3815 (7 a.m. to 7 p.m.)    Diagnosis: Contact with and (suspected) exposure to other viral communicable diseases  Diagnosis ICD: Z20.828

## 2020-11-18 DIAGNOSIS — Z20.822 SUSPECTED COVID-19 VIRUS INFECTION: Primary | ICD-10-CM

## 2020-11-22 ENCOUNTER — HEALTH MAINTENANCE LETTER (OUTPATIENT)
Age: 35
End: 2020-11-22

## 2020-11-24 ENCOUNTER — VIRTUAL VISIT (OUTPATIENT)
Dept: FAMILY MEDICINE | Facility: OTHER | Age: 35
End: 2020-11-24

## 2020-11-24 NOTE — PROGRESS NOTES
"Date: 2020 09:48:52  Clinician: Richard Pitts  Clinician NPI: [id number]  Patient: MATT RAINES  Patient : 1985  Patient Address: [address] [place] [address]  Patient Phone: [phone]  Visit Protocol: URI  Patient Summary:  MATT is a 35 year old ( : 1985 ) female who initiated a [facility] Visit for cold, sinus infection, or influenza. When asked the question \"Please sign me up to receive news, health information and promotions from [facility].\", MATT responded \"No\".    MATT states her symptoms started 1-2 days ago.   Her symptoms consist of vomiting, rhinitis, myalgia, malaise, tooth pain, diarrhea, a headache, and nausea.   Symptom details     Nasal secretions: The color of her mucus is clear.    Headache: She states the headache is moderate (4-6 on a 10 point pain scale).     Tooth pain: The tooth pain is not caused by a cavity, recent dental work, or other mouth problems.      MATT denies having facial pain or pressure, chills, sore throat, ageusia, ear pain, wheezing, fever, cough, nasal congestion, and anosmia. She also denies taking antibiotic medication in the past month and having recent facial or sinus surgery in the past 60 days. She is not experiencing dyspnea.   Precipitating events  She has not recently been exposed to someone with influenza. MATT has been in close contact with the following high risk individuals: adults 65 or older.   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 In the past 14 days, MATT has not worked or volunteered at a healthcare facility or group living setting.   In the past 14 days, she also has not lived in a congregate living setting.   MATT has not had a close contact with a laboratory-confirmed COVID-19 patient within 14 days of symptom onset.    Since 2019, MATT has been tested for COVID-19 and has had " upper respiratory infection (URI) or influenza-like illness.      Result of COVID-19 test: Negative     Date of her COVID-19 test: 11/18/2020     Date(s) of previous URI or influenza-like illness (free-text): 11-22, 11-23, 11-24     Symptoms MATT experienced during previous URI or influenza-like illness as reported by the patient (free-text): Nausea,  headache,  Diarrhea        Pertinent medical history  MATT does not get yeast infections when she takes antibiotics.   MATT needs a return to work/school note.   Weight: 123 lbs   MATT does not smoke or use smokeless tobacco.   She denies pregnancy and denies breastfeeding. She does not menstruate.   Additional information as reported by the patient (free text): My dr usually prescribes me zofran for nausea and I dont have any left.  I have had flu like symptoms for the last couple days and they aren't getting any better.   Weight: 123 lbs    MEDICATIONS: Zofran oral, ALLERGIES: venlafaxine, Toradol, tramadol  Clinician Response:  Dear MATT,   Your symptoms show that you may have coronavirus (COVID-19). This illness can cause fever, cough and trouble breathing. Many people get a mild case and get better on their own. Some people can get very sick.  What should I do?  We would like to test you for this virus.   1. Please call [phone] to schedule your visit. Explain that you were referred by [facility] to have a COVID-19 test. Be ready to share your [facility] visit ID number.  * If you need to schedule in [facility] please call [phone] or for [employer] employees please call [phone].  * If you need to schedule in the [place] area please call [phone]. [employer] employees call [phone].  The following will serve as your written order for this COVID Test, ordered by me, for the indication of suspected COVID [Z20.828]: The test will be ordered in [employer], our electronic health record, after you are scheduled. It will show as ordered and authorized by  "Wu Klein MD.  Order: COVID-19 (Coronavirus) PCR for SYMPTOMATIC testing from [facility].   2. When it's time for your COVID test:  Stay at least 6 feet away from others. (If someone will drive you to your test, stay in the backseat, as far away from the  as you can.)   Cover your mouth and nose with a mask, tissue or washcloth.  Go straight to the testing site. Don't make any stops on the way there or back.      3.Starting now: Stay home and away from others (self-isolate) until:   You've had no fever---and no medicine that reduces fever---for one full day (24 hours). And...   Your other symptoms have gotten better. For example, your cough or breathing has improved. And...   At least 10 days have passed since your symptoms started.       During this time, don't leave the house except for testing or medical care.   Stay in your own room, even for meals. Use your own bathroom if you can.   Stay away from others in your home. No hugging, kissing or shaking hands. No visitors.  Don't go to work, school or anywhere else.    Clean \"high touch\" surfaces often (doorknobs, counters, handles, etc.). Use a household cleaning spray or wipes. You'll find a full list of  on the EPA website: www.epa.gov/pesticide-registration/list-n-disinfectants-use-against-sars-cov-2.   Cover your mouth and nose with a mask, tissue or washcloth to avoid spreading germs.  Wash your hands and face often. Use soap and water.  Caregivers in these groups are at risk for severe illness due to COVID-19:  o People 65 years and older  o People who live in a nursing home or long-term care facility  o People with chronic disease (lung, heart, cancer, diabetes, kidney, liver, immunologic)  o People who have a weakened immune system, including those who:   Are in cancer treatment  Take medicine that weakens the immune system, such as corticosteroids  Had a bone marrow or organ transplant  Have an immune deficiency  Have poorly controlled HIV or " AIDS  Are obese (body mass index of 40 or higher)  Smoke regularly   o Caregivers should wear gloves while washing dishes, handling laundry and cleaning bedrooms and bathrooms.  o Use caution when washing and drying laundry: Don't shake dirty laundry, and use the warmest water setting that you can.  o For more tips, go to www.cdc.gov/coronavirus/2019-ncov/downloads/10Things.pdf.    4.Sign up for [employer]. We know it's scary to hear that you might have COVID-19. We want to track your symptoms to make sure you're okay over the next 2 weeks. Please look for an email from [employer]---this is a free, online program that we'll use to keep in touch. To sign up, follow the link in the email. Learn more at [email or web address]  How can I take care of myself?   Get lots of rest. Drink extra fluids (unless a doctor has told you not to).   Take Tylenol (acetaminophen) for fever or pain. If you have liver or kidney problems, ask your family doctor if it's okay to take Tylenol.   Adults can take either:    650 mg (two 325 mg pills) every 4 to 6 hours, or...   1,000 mg (two 500 mg pills) every 8 hours as needed.    Note: Don't take more than 3,000 mg in one day. Acetaminophen is found in many medicines (both prescribed and over-the-counter medicines). Read all labels to be sure you don't take too much.   For children, check the Tylenol bottle for the right dose. The dose is based on the child's age or weight.    If you have other health problems (like cancer, heart failure, an organ transplant or severe kidney disease): Call your specialty clinic if you don't feel better in the next 2 days.       Know when to call 911. Emergency warning signs include:    Trouble breathing or shortness of breath Pain or pressure in the chest that doesn't go away Feeling confused like you haven't felt before, or not being able to wake up Bluish-colored lips or face.  Where can I get more information?   [facility] -- About  COVID-19: [email or web address]   CDC -- What to Do If You're Sick: www.cdc.gov/coronavirus/2019-ncov/about/steps-when-sick.html   CDC -- Ending Home Isolation: www.cdc.gov/coronavirus/2019-ncov/hcp/disposition-in-home-patients.html   [facility] -- Caring for Someone: www.cdc.gov/coronavirus/2019-ncov/if-you-are-sick/care-for-someone.html   [facility] -- Interim Guidance for Hospital Discharge to Home: www.[facility]./diseases/coronavirus/hcp/hospdischarge.pdf   [facility] clinical trials (COVID-19 research studies): clinicalaffairs.[facility]-clinical-trials    Below are the COVID-19 hotlines at the [place] Department of Health ([facility]). Interpreters are available.    For health questions: Call 059-959-5049 or 1-522.442.9210 (7 a.m. to 7 p.m.) For questions about schools and childcare: Call 633-062-0184 or 1-770.242.4619 (7 a.m. to 7 p.m.)    Diagnosis: Contact with and (suspected) exposure to other viral communicable diseases  Diagnosis ICD: Z20.828  Prescription: ondansetron HCl (Zofran) 4 mg oral tablet 12 tablet, 0 days supply. Take 2 tablets by mouth 2 times per day. Refills: 0, Refill as needed: no, Allow substitutions: yes  Pharmacy: [id number] - [phone] - [address] [place] [address]

## 2020-11-30 ENCOUNTER — VIRTUAL VISIT (OUTPATIENT)
Dept: FAMILY MEDICINE | Facility: OTHER | Age: 35
End: 2020-11-30
Payer: COMMERCIAL

## 2020-11-30 PROCEDURE — 99421 OL DIG E/M SVC 5-10 MIN: CPT | Performed by: FAMILY MEDICINE

## 2020-11-30 NOTE — PROGRESS NOTES
"Date: 2020 12:30:28  Clinician: Juanjo Moyer  Clinician NPI: [id number]  Patient: MATT RAINES  Patient : 1985  Patient Address: [address] [place] [address]  Patient Phone: [phone]  Visit Protocol: URI  Patient Summary:  MATT is a 35 year old ( : 1985 ) female who initiated a [facility] Visit for cold, sinus infection, or influenza. When asked the question \"Please sign me up to receive news, health information and promotions from [facility].\", MATT responded \"No\".    MATT states her symptoms started suddenly 1 month or more ago.   Her symptoms consist of a headache, nausea, vomiting, myalgia, and malaise. MATT also feels feverish.   Symptom details     Temperature: Her current temperature is 99.2 degrees Fahrenheit.     Headache: MATT has not had the headache for 12 weeks or more. She states the headache is moderate (4-6 on a 10 point pain scale).      MATT denies having ear pain, wheezing, cough, nasal congestion, rhinitis, facial pain or pressure, chills, sore throat, teeth pain, ageusia, diarrhea, and anosmia. She also denies taking antibiotic medication in the past month, having recent facial or sinus surgery in the past 60 days, double sickening (worsening symptoms after initial improvement), and having a sinus infection within the past year. She is not experiencing dyspnea.   Precipitating events  She has recently been exposed to someone with influenza. MATT has not been in close contact with any high risk individuals.   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 In the past 14 days, MATT has not worked or volunteered at a healthcare facility or group living setting.   In the past 14 days, she also has not lived in a congregate living setting.   MATT has had a close contact with a laboratory-confirmed COVID-19 patient within 14 days of symptom " onset. She was not exposed at her work. Date MATT was exposed to the laboratory-confirmed COVID-19 patient: 11/11/2020   Additional information about contact with COVID-19 (Coronavirus) patient as reported by the patient (free text): My father in law that lives in my house    Since December 2019, MATT has been tested for COVID-19 and has had upper respiratory infection (URI) or influenza-like illness.      Result of COVID-19 test: Negative     Date of her COVID-19 test: 11/18/2028     Date(s) of previous URI or influenza-like illness (free-text): November 24th-currently     Symptoms MATT experienced during previous URI or influenza-like illness as reported by the patient (free-text): Nausea,  vomiting, chills and aches.        Pertinent medical history  MATT has asthma. She uses quick-relief inhaler less than two times per week. She refills her quick-relief inhaler less than two times per year. She wakes up at night with asthma symptoms less than two times per month.   She has not been told by her provider to avoid NSAIDs.   MATT does not get yeast infections when she takes antibiotics.   MATT does not have diabetes. She denies having immunosuppressive conditions (e.g., chemotherapy, HIV, organ transplant, long-term use of steroids or other immunosuppressive medications, splenectomy). She does not have severe COPD and congestive heart failure.   MATT needs a return to work/school note.   Weight: 125 lbs   MATT does not smoke or use smokeless tobacco.   She denies pregnancy and denies breastfeeding. She does not menstruate.   Additional information as reported by the patient (free text): I do have stomach problems,  if I am not feeling well my dr will prescribe me zofran.   Weight: 125 lbs  A synchronous phone visit was initiated by the provider for the following reason: duration and stomach    MEDICATIONS: Zofran oral, ALLERGIES: venlafaxine, Toradol, tramadol  Clinician Response:  David GUTIERREZ    Your symptoms show that you may have coronavirus (COVID-19). This illness can cause fever, cough and trouble breathing. Many people get a mild case and get better on their own. Some people can get very sick.  What should I do?  We would like to test you for this virus.   1. Please call [phone] to schedule your visit. Explain that [facility]. Be ready to share your [facility] ID number.  * If you need to schedule in [facility] please call [phone] or for [employer] employees please call [phone].  * If you need to schedule in the [place] area please call [phone]. [employer] employees call [phone].  The following will serve as your written order for this COVID Test, ordered by me, for the indication of suspected COVID [Z20.828]: The test will be ordered in [employer], our electronic health record, after you are scheduled. It will show as ordered and authorized by Wu Klein MD.  Order: COVID-19 (Coronavirus) PCR for SYMPTOMATIC testing from [facility].   2. When it's time for your COVID test:  Stay at least 6 feet away from others. (If someone will drive you to your test, stay in the backseat, as far away from the  as you can.)   Cover your mouth and nose with a mask, tissue or washcloth.  Go straight to the testing site. Don't make any stops on the way there or back.      3.Starting now: Stay home and away from others (self-isolate) until:   You've had no fever---and no medicine that reduces fever---for one full day (24 hours). And...   Your other symptoms have gotten better. For example, your cough or breathing has improved. And...   At least 10 days have passed since your symptoms started.       During this time, don't leave the house except for testing or medical care.   Stay in your own room, even for meals. Use your own bathroom if you can.   Stay away from others in your home. No hugging, kissing or shaking hands. No visitors.  Don't go to work, school  "or anywhere else.    Clean \"high touch\" surfaces often (doorknobs, counters, handles, etc.). Use a household cleaning spray or wipes. You'll find a full list of  on the EPA website: www.epa.gov/pesticide-registration/list-n-disinfectants-use-against-sars-cov-2.   Cover your mouth and nose with a mask, tissue or washcloth to avoid spreading germs.  Wash your hands and face often. Use soap and water.  Caregivers in these groups are at risk for severe illness due to COVID-19:  o People 65 years and older  o People who live in a nursing home or long-term care facility  o People with chronic disease (lung, heart, cancer, diabetes, kidney, liver, immunologic)  o People who have a weakened immune system, including those who:   Are in cancer treatment  Take medicine that weakens the immune system, such as corticosteroids  Had a bone marrow or organ transplant  Have an immune deficiency  Have poorly controlled HIV or AIDS  Are obese (body mass index of 40 or higher)  Smoke regularly   o Caregivers should wear gloves while washing dishes, handling laundry and cleaning bedrooms and bathrooms.  o Use caution when washing and drying laundry: Don't shake dirty laundry, and use the warmest water setting that you can.  o For more tips, go to www.cdc.gov/coronavirus/2019-ncov/downloads/10Things.pdf.    4.Sign up for [employer]. We know it's scary to hear that you might have COVID-19. We want to track your symptoms to make sure you're okay over the next 2 weeks. Please look for an email from [employer]---this is a free, online program that we'll use to keep in touch. To sign up, follow the link in the email. Learn more at [email or web address]  How can I take care of myself?   Get lots of rest. Drink extra fluids (unless a doctor has told you not to).   Take Tylenol (acetaminophen) for fever or pain. If you have liver or kidney problems, ask your family doctor if it's okay to take Tylenol.   Adults can take " either:    650 mg (two 325 mg pills) every 4 to 6 hours, or...   1,000 mg (two 500 mg pills) every 8 hours as needed.    Note: Don't take more than 3,000 mg in one day. Acetaminophen is found in many medicines (both prescribed and over-the-counter medicines). Read all labels to be sure you don't take too much.   For children, check the Tylenol bottle for the right dose. The dose is based on the child's age or weight.    If you have other health problems (like cancer, heart failure, an organ transplant or severe kidney disease): Call your specialty clinic if you don't feel better in the next 2 days.       Know when to call 911. Emergency warning signs include:    Trouble breathing or shortness of breath Pain or pressure in the chest that doesn't go away Feeling confused like you haven't felt before, or not being able to wake up Bluish-colored lips or face.  Where can I get more information?   [facility] -- About COVID-19: [email or web address]   CDC -- What to Do If You're Sick: www.cdc.gov/coronavirus/2019-ncov/about/steps-when-sick.html   CDC -- Ending Home Isolation: www.cdc.gov/coronavirus/2019-ncov/hcp/disposition-in-home-patients.html   CDC -- Caring for Someone: www.cdc.gov/coronavirus/2019-ncov/if-you-are-sick/care-for-someone.html   [facility] -- Interim Guidance for Hospital Discharge to Home: www.health.[facility].[place]/diseases/coronavirus/hcp/hospdischarge.pdf   [facility] clinical trials (COVID-19 research studies): clinicalaffairs.[facility]/n-clinical-trials    Below are the COVID-19 hotlines at the [place] Department of Health ([facility]). Interpreters are available.    For health questions: Call 877-585-1409 or 1-118.784.9236 (7 a.m. to 7 p.m.) For questions about schools and childcare: Call 836-789-0456 or 1-675.724.4047 (7 a.m. to 7 p.m.)    Diagnosis: Contact with and (suspected) exposure to other viral communicable diseases  Diagnosis ICD: Z20.828  Triage Notes: I reviewed the  patient's history, verified their identity, and explained the [facility] Visit process.    wants repeat covid, seeing a provider for her migraines  Synchronous Triage: phone, status: completed, duration: 282 seconds

## 2020-12-12 ENCOUNTER — VIRTUAL VISIT (OUTPATIENT)
Dept: FAMILY MEDICINE | Facility: OTHER | Age: 35
End: 2020-12-12

## 2020-12-12 NOTE — PROGRESS NOTES
"Date: 2020 17:34:25  Clinician: Yesi South  Clinician NPI: [id number]  Patient: MATT RAINES  Patient : 1985  Patient Address: [address] [place] [address]  Patient Phone: [phone]  Visit Protocol: URI  Patient Summary:  MATT is a 35 year old ( : 1985 ) female who initiated a [facility] Visit for cold, sinus infection, or influenza. When asked the question \"Please sign me up to receive news, health information and promotions from [facility].\", MATT responded \"No\".    MATT states her symptoms started suddenly 10-13 days ago.   Her symptoms consist of a headache, nausea, vomiting, and malaise.   Symptom details   Headache: She states the headache is moderate (4-6 on a 10 point pain scale).    MATT denies having ear pain, wheezing, fever, cough, nasal congestion, rhinitis, facial pain or pressure, myalgias, chills, sore throat, teeth pain, ageusia, diarrhea, and anosmia. She also denies taking antibiotic medication in the past month, having recent facial or sinus surgery in the past 60 days, and double sickening (worsening symptoms after initial improvement). She is not experiencing dyspnea.    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 In the past 14 days, MATT has not worked or volunteered at a healthcare facility or group living setting.   In the past 14 days, she also has not lived in a congregate living setting.   MATT has not had a close contact with a laboratory-confirmed COVID-19 patient within 14 days of symptom onset.    Since 2019, MATT has been tested for COVID-19 and has not had upper respiratory infection or influenza-like illness.      Result of COVID-19 test: Negative     Date of her COVID-19 test: 2020      Pertinent medical history  MATT has asthma. She uses quick-relief inhaler less than two times per week. She " refills her quick-relief inhaler less than two times per year. She wakes up at night with asthma symptoms less than two times per month.   She has not been told by her provider to avoid NSAIDs.   MATT does not get yeast infections when she takes antibiotics.   MATT does not have diabetes. She denies having immunosuppressive conditions (e.g., chemotherapy, HIV, organ transplant, long-term use of steroids or other immunosuppressive medications, splenectomy). She does not have severe COPD and congestive heart failure.   MATT needs a return to work/school note.   Weight: 123 lbs   MATT does not smoke or use smokeless tobacco.   She denies pregnancy and denies breastfeeding. She does not menstruate.   Additional information as reported by the patient (free text): I have had stomach problems in the past and my dr will provide me zofran disolvable tablets 8 mg.   Weight: 123 lbs    MEDICATIONS: Zofran oral, ALLERGIES: venlafaxine, Toradol, tramadol  Clinician Response:  Dear MATT,  I am sorry you are not feeling well. To determine the most appropriate care for you, I would like you to be seen in person to further discuss your health history and symptoms.  You will not be charged for this [facility] Visit. Thank you for trusting us with your care.  COVID-19 (Coronavirus) General Information  Because there is currently no vaccine to prevent infection, the best way to protect yourself is to avoid being exposed to this virus. Common symptoms of COVID-19 include but are not limited to fever, cough, and shortness of breath. These symptoms appear 2-14 days after you are exposed to the virus that causes COVID-19. Click here for more information from the CDC on how to protect yourself.  If you are sick with COVID-19 or suspect you are infected with the virus that causes COVID-19, follow the steps here from the CDC to help prevent the disease from spreading to people in your home and community.  Click here for general  information from the CDC on testing.  If you develop any of these emergency warning signs for COVID-19, get medical attention immediately:     Trouble breathing    Persistent pain or pressure in the chest    New confusion or inability to arouse    Bluish lips or face      Call your doctor or clinic before going in. Call 911 if you have a medical emergency and notify the  you have or think you may have COVID-19.  For more detailed and up to date information on COVID-19 (Coronavirus), please visit the CDC website.   Diagnosis: Refer for additional evaluation  Diagnosis ICD: R69

## 2021-02-08 ENCOUNTER — OFFICE VISIT - HEALTHEAST (OUTPATIENT)
Dept: FAMILY MEDICINE | Facility: CLINIC | Age: 36
End: 2021-02-08

## 2021-02-08 DIAGNOSIS — M89.9 HUMERUS LESION, RIGHT: ICD-10-CM

## 2021-02-08 DIAGNOSIS — M25.511 ACUTE PAIN OF RIGHT SHOULDER: ICD-10-CM

## 2021-02-10 ENCOUNTER — HOSPITAL ENCOUNTER (OUTPATIENT)
Dept: MRI IMAGING | Facility: CLINIC | Age: 36
Discharge: HOME | End: 2021-02-10
Attending: NURSE PRACTITIONER

## 2021-02-10 DIAGNOSIS — M89.9 HUMERUS LESION, RIGHT: ICD-10-CM

## 2021-02-11 ENCOUNTER — COMMUNICATION - HEALTHEAST (OUTPATIENT)
Dept: FAMILY MEDICINE | Facility: CLINIC | Age: 36
End: 2021-02-11

## 2021-03-05 ENCOUNTER — OFFICE VISIT - HEALTHEAST (OUTPATIENT)
Dept: FAMILY MEDICINE | Facility: CLINIC | Age: 36
End: 2021-03-05

## 2021-03-05 DIAGNOSIS — G43.809 OTHER MIGRAINE WITHOUT STATUS MIGRAINOSUS, NOT INTRACTABLE: ICD-10-CM

## 2021-05-08 ENCOUNTER — COMMUNICATION - HEALTHEAST (OUTPATIENT)
Dept: SCHEDULING | Facility: CLINIC | Age: 36
End: 2021-05-08

## 2021-05-11 ENCOUNTER — RECORDS - HEALTHEAST (OUTPATIENT)
Dept: ADMINISTRATIVE | Facility: OTHER | Age: 36
End: 2021-05-11

## 2021-05-26 ASSESSMENT — PATIENT HEALTH QUESTIONNAIRE - PHQ9: SUM OF ALL RESPONSES TO PHQ QUESTIONS 1-9: 5

## 2021-05-27 ENCOUNTER — RECORDS - HEALTHEAST (OUTPATIENT)
Dept: ADMINISTRATIVE | Facility: CLINIC | Age: 36
End: 2021-05-27

## 2021-05-27 ENCOUNTER — OFFICE VISIT - HEALTHEAST (OUTPATIENT)
Dept: FAMILY MEDICINE | Facility: CLINIC | Age: 36
End: 2021-05-27

## 2021-05-27 VITALS
OXYGEN SATURATION: 100 % | TEMPERATURE: 98.3 F | SYSTOLIC BLOOD PRESSURE: 117 MMHG | HEART RATE: 84 BPM | RESPIRATION RATE: 16 BRPM | DIASTOLIC BLOOD PRESSURE: 76 MMHG

## 2021-05-27 DIAGNOSIS — R11.0 NAUSEA: ICD-10-CM

## 2021-05-27 DIAGNOSIS — F33.0 MILD RECURRENT MAJOR DEPRESSION (H): ICD-10-CM

## 2021-05-27 DIAGNOSIS — G89.29 OTHER CHRONIC PAIN: ICD-10-CM

## 2021-05-27 DIAGNOSIS — B35.1 ONYCHOMYCOSIS: ICD-10-CM

## 2021-05-27 ASSESSMENT — PATIENT HEALTH QUESTIONNAIRE - PHQ9: SUM OF ALL RESPONSES TO PHQ QUESTIONS 1-9: 0

## 2021-05-27 NOTE — TELEPHONE ENCOUNTER
Left message to call back for: pt  Information to relay to patient:  Please help pt schedule 2 nurse only appts. First one for tb placement and second for tb read the second one needs to be 48-72 hrs after the placement. This is first avail

## 2021-05-27 NOTE — TELEPHONE ENCOUNTER
New Appointment Needed  What is the reason for the visit:    Mantoux Placement  Appt Request  What is the purpose of the mantoux?:  Exposure: and for job  Is there a form to be completed?:   No  How soon do you need the mantoux placed?:  date: asap    Provider Preference: Any available  How soon do you need to be seen?: tomorrow  Waitlist offered?: No  Okay to leave a detailed message:  Yes    Poss exposure and for employment.

## 2021-05-27 NOTE — TELEPHONE ENCOUNTER
RN cannot approve Refill Request    RN can NOT refill this medication med is not covered by policy/route to provider     . Last office visit: 2/26/2019 Sophie Gibson CNP Last Physical: 7/2/2018 Last MTM visit: Visit date not found Last visit same specialty: 2/26/2019 Sophie Gibson CNP.  Next visit within 3 mo: Visit date not found  Next physical within 3 mo: Visit date not found      Laverne Covarrubias, [facility] Triage/Med Refill 4/3/2019    Requested Prescriptions   Pending Prescriptions Disp Refills     ondansetron (ZOFRAN) 4 MG tablet 20 tablet 0     Sig: Take 1 tablet (4 mg total) by mouth every 12 (twelve) hours as needed for nausea.    There is no refill protocol information for this order

## 2021-05-28 ENCOUNTER — COMMUNICATION - HEALTHEAST (OUTPATIENT)
Dept: FAMILY MEDICINE | Facility: CLINIC | Age: 36
End: 2021-05-28

## 2021-05-28 ASSESSMENT — ASTHMA QUESTIONNAIRES
ACT_TOTALSCORE: 23
ACT_TOTALSCORE: 25

## 2021-05-28 ASSESSMENT — PATIENT HEALTH QUESTIONNAIRE - PHQ9: SUM OF ALL RESPONSES TO PHQ QUESTIONS 1-9: 5

## 2021-05-28 NOTE — TELEPHONE ENCOUNTER
Test Results  Who is calling?: Patient  Who ordered the test: Sophie Gibson CNP  Type of test: Lab  Date of test:  05/09/2019  Where was the test performed: [facility]  What are your questions/concerns?:  Patient would like a return phone call to discuss her results.  Okay to leave a detailed message?:  Yes

## 2021-05-28 NOTE — TELEPHONE ENCOUNTER
Please notify the patient that her lab results show no concerning findings.  I recommend she see the sleep center for further evaluation as we discussed. Thanks.

## 2021-05-28 NOTE — PROGRESS NOTES
Assessment and Plan:     1. Fatigue, unspecified type  We will rule out anemia, diabetes, vitamin deficiencies, thyroid disease.  Discussed good sleep hygiene.  Due to chronicity, will refer to sleep center for further evaluation.  - HM2(CBC w/o Differential)  - Comprehensive Metabolic Panel  - Vitamin D, Total (25-Hydroxy)  - Vitamin B12  - Thyroid Cascade  - Sedimentation Rate  - Ambulatory referral to Sleep Medicine    2. Nausea  Patient has a history of endometriosis, which may be contributing.  Will refer to gastroenterology for further evaluation and possible endoscopy.  She continues Zofran as needed.  I explained to her that I do not prescribe this long-term.  - Ambulatory referral to Gastroenterology  - ondansetron (ZOFRAN) 4 MG tablet; Take 1 tablet (4 mg total) by mouth every 12 (twelve) hours as needed for nausea.  Dispense: 20 tablet; Refill: 0    3. Mild intermittent asthma without complication  She continues albuterol as needed.    4. Neck Pain  She continues cyclobenzaprine as needed.  She is content with the plan.        Subjective:     Vicki is a 33 y.o. female presenting to the clinic for multiple concerns today.  Patient has experienced nausea intermittently for multiple years.  She has periods will she will experience nausea for 2 to 3 days in a row.  She occasionally dry heaves.  She does occasionally experience constipation.  2 weeks ago, she noticed blood in her stool.  She has a history of a hemorrhoidectomy.  She is waiting for a cancellation from gastroenterology.  Patient has been experiencing fatigue for 4 to 5 years.  She does not wake up refreshed.  She tries to avoid napping during the day.  She does not snore.  She does not feel as though she sleeps soundly throughout the night.  She denies any concerns for depression and anxiety at this point.  She is not interested in taking medication.  She does suffer from chronic neck pain and takes cyclobenzaprine as needed.  She has  intermittent asthma and denies any recent asthma flares.    Review of Systems: A complete 14 point review of systems was obtained and is negative or as stated in the history of present illness.    Social History     Socioeconomic History     Marital status: Single     Spouse name: Not on file     Number of children: Not on file     Years of education: Not on file     Highest education level: Not on file   Occupational History     Not on file   Social Needs     Financial resource strain: Not on file     Food insecurity:     Worry: Not on file     Inability: Not on file     Transportation needs:     Medical: Not on file     Non-medical: Not on file   Tobacco Use     Smoking status: Former Smoker     Smokeless tobacco: Former User     Quit date: 9/2/2003     Tobacco comment: quit smoking 14 yrs ago   Substance and Sexual Activity     Alcohol use: Yes     Alcohol/week: 0.6 oz     Types: 1 Shots of liquor per week     Comment: rare     Drug use: Yes     Types: Marijuana     Comment: recreational     Sexual activity: Not on file   Lifestyle     Physical activity:     Days per week: Not on file     Minutes per session: Not on file     Stress: Not on file   Relationships     Social connections:     Talks on phone: Not on file     Gets together: Not on file     Attends Christian service: Not on file     Active member of club or organization: Not on file     Attends meetings of clubs or organizations: Not on file     Relationship status: Not on file     Intimate partner violence:     Fear of current or ex partner: Not on file     Emotionally abused: Not on file     Physically abused: Not on file     Forced sexual activity: Not on file   Other Topics Concern     Not on file   Social History Narrative     Not on file       Active Ambulatory Problems     Diagnosis Date Noted     Allergic Rhinitis      Asthma      Endometriosis      Major Depression, Recurrent      Anxiety      Tension-type Headache      Abnormal Blood Chemistry   "    Torticollis      Neck Pain      Cervical radiculopathy 11/24/2014     Abnormal weight loss 01/22/2015     S/P partial hysterectomy 03/09/2015     Pelvic pain 02/13/2018     Left ovarian cyst 02/13/2018     Resolved Ambulatory Problems     Diagnosis Date Noted     External Hemorrhoids      Lump In / On The Skin      Upper Respiratory Infection      Palpitations      Atypical Chest Pain      Hematemesis 03/09/2015     Asthma with acute exacerbation 05/15/2010     Past Medical History:   Diagnosis Date     Allergic Rhinitis      Anxiety      Arthritis      Asthma      Cervical disc displacement      Depression      Endometriosis      External hemorrhoid      Groin cyst      Heart palpitations      History of anesthesia complications      Major Depression, Recurrent      Mild Persistent Asthma      PONV (postoperative nausea and vomiting)        Family History   Problem Relation Age of Onset     No Medical Problems Mother      Lung cancer Father      Stroke Father      Cancer Maternal Grandmother      Cancer Maternal Grandfather      Diabetes Paternal Grandmother        Objective:     BP (!) 88/52   Pulse 69   Ht 5' 4\" (1.626 m)   Wt 119 lb 9.6 oz (54.3 kg)   SpO2 98%   BMI 20.53 kg/m      Patient is alert, in no obvious distress.   Skin: Warm, dry.  No lesions or rashes.  Skin turgor rapid return.   HEENT:  Head normocephalic, atraumatic.  Eyes normal.   Ears normal.  Nose patent, mucosa pink.  Oropharynx mucosa pink.  No lesions or tonsillar enlargement.   Neck: Supple, no lymphadenopathy. No thyromegaly.  Lungs:  Clear to auscultation. Respirations even and unlabored.  No wheezing or rales noted.   Heart:  Regular rate and rhythm.  No murmurs, S3, S4, gallops, or rubs.    Abdomen: Soft, nontender.  No organomegaly. Bowel sounds normoactive. No guarding or masses noted.               "

## 2021-05-29 ENCOUNTER — RECORDS - HEALTHEAST (OUTPATIENT)
Dept: ADMINISTRATIVE | Facility: CLINIC | Age: 36
End: 2021-05-29

## 2021-05-30 VITALS — WEIGHT: 118.5 LBS | HEIGHT: 64 IN | BODY MASS INDEX: 20.23 KG/M2

## 2021-05-30 VITALS — WEIGHT: 118.5 LBS | HEIGHT: 65 IN | BODY MASS INDEX: 19.74 KG/M2

## 2021-05-30 VITALS — BODY MASS INDEX: 20.2 KG/M2 | HEIGHT: 64 IN | WEIGHT: 118.3 LBS

## 2021-05-30 NOTE — TELEPHONE ENCOUNTER
"RN Triage:    Decreased appetite, nausea x 6 d.  Occasional diarrhea began 2 d ago.  Mild upper abdominal pain-\"I think it is because I'm hungry\".  Has been working even though feeling unwell.  No possibility of pregnancy.  Home care discussed.  Appointment made in clinic for tomorrow.    Adriana Rangel RN  [facility]        Reason for Disposition    Unexplained nausea    Nausea persists > 1 week    Protocols used: NAUSEA-A-AH      "

## 2021-05-31 ENCOUNTER — RECORDS - HEALTHEAST (OUTPATIENT)
Dept: ADMINISTRATIVE | Facility: CLINIC | Age: 36
End: 2021-05-31

## 2021-05-31 VITALS — BODY MASS INDEX: 19.5 KG/M2 | WEIGHT: 113.6 LBS

## 2021-05-31 VITALS — WEIGHT: 121 LBS | BODY MASS INDEX: 20.77 KG/M2

## 2021-05-31 VITALS — HEIGHT: 64 IN | BODY MASS INDEX: 19.97 KG/M2 | WEIGHT: 117 LBS

## 2021-05-31 VITALS — BODY MASS INDEX: 19.91 KG/M2 | WEIGHT: 116 LBS

## 2021-05-31 VITALS — BODY MASS INDEX: 20.94 KG/M2 | WEIGHT: 122 LBS

## 2021-05-31 VITALS — HEIGHT: 64 IN | BODY MASS INDEX: 21.24 KG/M2 | WEIGHT: 124.4 LBS

## 2021-05-31 VITALS — WEIGHT: 124.5 LBS | HEIGHT: 64 IN | BODY MASS INDEX: 21.25 KG/M2

## 2021-05-31 VITALS — WEIGHT: 115.6 LBS | BODY MASS INDEX: 19.84 KG/M2

## 2021-06-01 ENCOUNTER — RECORDS - HEALTHEAST (OUTPATIENT)
Dept: ADMINISTRATIVE | Facility: CLINIC | Age: 36
End: 2021-06-01

## 2021-06-01 VITALS — HEIGHT: 64 IN | BODY MASS INDEX: 19.81 KG/M2 | WEIGHT: 116 LBS

## 2021-06-01 VITALS — WEIGHT: 117.6 LBS | HEIGHT: 64 IN | BODY MASS INDEX: 20.08 KG/M2

## 2021-06-01 VITALS — BODY MASS INDEX: 19.85 KG/M2 | WEIGHT: 116.3 LBS | HEIGHT: 64 IN

## 2021-06-01 VITALS — WEIGHT: 116 LBS | BODY MASS INDEX: 19.81 KG/M2 | HEIGHT: 64 IN

## 2021-06-01 VITALS — HEIGHT: 64 IN | BODY MASS INDEX: 21.68 KG/M2 | WEIGHT: 127 LBS

## 2021-06-01 VITALS — BODY MASS INDEX: 19.91 KG/M2 | WEIGHT: 116 LBS

## 2021-06-01 VITALS — WEIGHT: 116.56 LBS | BODY MASS INDEX: 20.01 KG/M2

## 2021-06-01 VITALS — WEIGHT: 118 LBS | HEIGHT: 64 IN | BODY MASS INDEX: 20.14 KG/M2

## 2021-06-02 ENCOUNTER — RECORDS - HEALTHEAST (OUTPATIENT)
Dept: ADMINISTRATIVE | Facility: CLINIC | Age: 36
End: 2021-06-02

## 2021-06-02 VITALS — WEIGHT: 120.9 LBS | HEIGHT: 64 IN | BODY MASS INDEX: 20.64 KG/M2

## 2021-06-02 VITALS — BODY MASS INDEX: 20.98 KG/M2 | WEIGHT: 122.9 LBS | HEIGHT: 64 IN

## 2021-06-02 VITALS — WEIGHT: 123.4 LBS | BODY MASS INDEX: 21.07 KG/M2 | HEIGHT: 64 IN

## 2021-06-02 VITALS — BODY MASS INDEX: 20.77 KG/M2 | WEIGHT: 121 LBS

## 2021-06-02 VITALS — WEIGHT: 123 LBS | BODY MASS INDEX: 21.11 KG/M2

## 2021-06-02 NOTE — PROGRESS NOTES
Assessment and Plan:     1. Viral gastroenteritis  ondansetron (ZOFRAN-ODT) 4 MG disintegrating tablet     Discussed symptomatic treatment of viral symptoms.  Discussed importance of good hydration.  Provided Zofran to use as needed for nausea.  Provided note excusing patient from work from 10/9/2019 through 10/11/2019.  She is to follow-up if symptoms persist or worsen.    Subjective:     Vicki is a 33 y.o. female presenting to the clinic for concerns for vomiting and diarrhea.  Patient states yesterday morning, she developed vomiting and diarrhea. Her last bout of diarrhea and vomiting was at 9:30 AM this morning.  She complains of lack of appetite.  She has had some cramping within her lower abdomen, but denies abdominal pain or discomfort.  She felt warm this morning but did not take her temperature.  She complains of body aches.  She denies other cold symptoms including rhinorrhea, postnasal drainage, cough, headache, sinus congestion.  She has been consuming ginger ale.  She did start a new job 2 months ago at [employer].  No one else in the family is ill.  She requires a note to be excused from work.    Review of Systems: A complete 14 point review of systems was obtained and is negative or as stated in the history of present illness.    Social History     Socioeconomic History     Marital status: Single     Spouse name: Not on file     Number of children: Not on file     Years of education: Not on file     Highest education level: Not on file   Occupational History     Not on file   Social Needs     Financial resource strain: Not on file     Food insecurity:     Worry: Not on file     Inability: Not on file     Transportation needs:     Medical: Not on file     Non-medical: Not on file   Tobacco Use     Smoking status: Former Smoker     Smokeless tobacco: Former User     Quit date: 9/2/2003     Tobacco comment: quit smoking 14 yrs ago   Substance and Sexual Activity     Alcohol use: Yes     Alcohol/week:  1.0 standard drinks     Types: 1 Shots of liquor per week     Comment: rare     Drug use: Yes     Types: Marijuana     Comment: recreational     Sexual activity: Not on file   Lifestyle     Physical activity:     Days per week: Not on file     Minutes per session: Not on file     Stress: Not on file   Relationships     Social connections:     Talks on phone: Not on file     Gets together: Not on file     Attends Worship service: Not on file     Active member of club or organization: Not on file     Attends meetings of clubs or organizations: Not on file     Relationship status: Not on file     Intimate partner violence:     Fear of current or ex partner: Not on file     Emotionally abused: Not on file     Physically abused: Not on file     Forced sexual activity: Not on file   Other Topics Concern     Not on file   Social History Narrative     Not on file       Active Ambulatory Problems     Diagnosis Date Noted     Allergic Rhinitis      Asthma      Endometriosis      Major Depression, Recurrent      Anxiety      Tension-type Headache      Abnormal Blood Chemistry      Torticollis      Neck Pain      Cervical radiculopathy 11/24/2014     Abnormal weight loss 01/22/2015     S/P partial hysterectomy 03/09/2015     Pelvic pain 02/13/2018     Left ovarian cyst 02/13/2018     Resolved Ambulatory Problems     Diagnosis Date Noted     External Hemorrhoids      Lump In / On The Skin      Upper Respiratory Infection      Palpitations      Atypical Chest Pain      Hematemesis 03/09/2015     Asthma with acute exacerbation 05/15/2010     Past Medical History:   Diagnosis Date     Allergic Rhinitis      Anxiety      Arthritis      Cervical disc displacement      Depression      Endometriosis      External hemorrhoid      Groin cyst      Heart palpitations      History of anesthesia complications      Major Depression, Recurrent      Mild Persistent Asthma      PONV (postoperative nausea and vomiting)        Family History    Problem Relation Age of Onset     No Medical Problems Mother      Lung cancer Father      Stroke Father      Cancer Maternal Grandmother      Cancer Maternal Grandfather      Diabetes Paternal Grandmother        Objective:     /60 (Patient Site: Right Arm, Patient Position: Sitting, Cuff Size: Adult Regular)   Pulse 81   Wt 123 lb 9.6 oz (56.1 kg)   SpO2 99%   BMI 21.22 kg/m      Patient is alert, in no obvious distress.   Skin: Warm, dry.  No lesions or rashes.  Skin turgor rapid return.   HEENT:  Head normocephalic, atraumatic.  Eyes normal.  Ears normal.  Nose patent, mucosa pink.  Oropharynx mucosa pink.  No lesions or tonsillar enlargement.   Neck: Supple, no lymphadenopathy.   Lungs:  Clear to auscultation. Respirations even and unlabored.  No wheezing or rales noted.   Heart:  Regular rate and rhythm.  No murmurs, S3, S4, gallops, or rubs.    Abdomen: Soft, nontender.  No organomegaly. Bowel sounds normoactive. No guarding or masses noted.

## 2021-06-02 NOTE — PROGRESS NOTES
Subjective:      Patient ID: Vicki Pierson is a 33 y.o. female.    Chief Complaint:    Patient is in today for flare of her asthma.  She has had a cold for the last few days with some congestion which is been clear cough wheezing shortness of breath.  She has not had an inhaler for a long time.  She also is requesting a few Zofran if she is coughing hard enough that she is nauseated.  No fevers chills or sweats.  No chest pain.    Cough   Associated symptoms include coughing and a sore throat.         Past Medical History:   Diagnosis Date     Allergic Rhinitis     Created by Conversion      Anxiety     Created by Conversion      Arthritis      Asthma      Cervical disc displacement      Depression      Endometriosis      External hemorrhoid      Groin cyst      Heart palpitations     wore holter monitor for 24 hours     History of anesthesia complications     itchy in recovery     Major Depression, Recurrent     Created by Conversion      Mild Persistent Asthma     Created by Conversion      PONV (postoperative nausea and vomiting)        Past Surgical History:   Procedure Laterality Date     CERVICAL FUSION N/A 11/24/2014    Procedure: Anterior Cervical Decompression Fusion Cervical 6 through Cervical 7 Bilateral;  Surgeon: Joselito Wolfe MD;  [place] [facility] [place]:      CERVICAL FUSION Bilateral      CYST REMOVAL      [place]     HEMORROIDECTOMY N/A 7/19/2018    Procedure: HEMORRHOIDECTOMY;  Surgeon: Anette Klein MD;  Location: [facility] OR;  Service:      HYSTERECTOMY       OOPHORECTOMY Left      OR LAP,DIAGNOSTIC ABDOMEN Left 2/13/2018    Procedure: LAPAROSCOPY LEFT  OOPHORECTOMY;  Surgeon: Letty Floyd DO;  Location: [facility];  Service: Gynecology     OR LIGATE FALLOPIAN TUBE      Description: Tubal Ligation;  Recorded: 11/21/2014;     OR TOTAL ABDOM HYSTERECTOMY      Description: Hysterectomy;  Recorded: 11/21/2014;     TUBAL LIGATION         Family History    Problem Relation Age of Onset     No Medical Problems Mother      Lung cancer Father      Stroke Father      Cancer Maternal Grandmother      Cancer Maternal Grandfather      Diabetes Paternal Grandmother        Social History     Tobacco Use     Smoking status: Former Smoker     Smokeless tobacco: Former User     Quit date: 9/2/2003     Tobacco comment: quit smoking 14 yrs ago   Substance Use Topics     Alcohol use: Yes     Alcohol/week: 1.0 standard drinks     Types: 1 Shots of liquor per week     Comment: rare     Drug use: Yes     Types: Marijuana     Comment: recreational       Review of Systems   Constitutional: Negative.    HENT: Positive for rhinorrhea and sore throat.    Eyes: Negative.    Respiratory: Positive for cough, shortness of breath and wheezing.    Cardiovascular: Negative.    Genitourinary: Negative.    Neurological: Negative.        Objective:     /70 (Patient Site: Right Arm, Patient Position: Sitting, Cuff Size: Adult Small)   Pulse (!) 104   Temp 98.4  F (36.9  C) (Oral)   Resp 24   Wt 118 lb 1.6 oz (53.6 kg)   SpO2 98%   BMI 20.27 kg/m      Physical Exam  Vitals signs and nursing note reviewed.   Constitutional:       Appearance: Normal appearance.   HENT:      Head: Normocephalic.      Right Ear: Tympanic membrane normal.      Left Ear: Tympanic membrane normal.      Nose: Nose normal.      Mouth/Throat:      Mouth: Mucous membranes are moist.      Pharynx: Posterior oropharyngeal erythema present.   Eyes:      Conjunctiva/sclera: Conjunctivae normal.   Neck:      Musculoskeletal: Normal range of motion and neck supple.   Cardiovascular:      Rate and Rhythm: Regular rhythm. Tachycardia present.   Pulmonary:      Effort: No respiratory distress.      Breath sounds: Wheezing present. No rales.   Lymphadenopathy:      Cervical: No cervical adenopathy.   Skin:     General: Skin is warm and dry.   Neurological:      General: No focal deficit present.      Mental Status: She is  alert and oriented to person, place, and time.         Assessment: Patient with URI and flare of her asthma.  Will discharge on a prednisone burst and taper.  Refilled her albuterol inhaler 2 puffs 4 times daily as needed for wheezing.  We will renew her Zofran for nausea.  She will follow-up in 2 to 3 days if not improving sooner if worse.  She knows she has not needed her inhaler in a long long time certainly if this continues to recur she will need some sort of maintenance.     Procedures    The primary encounter diagnosis was Mild intermittent asthma with exacerbation. A diagnosis of Viral gastroenteritis was also pertinent to this visit.    Plan: As noted above

## 2021-06-03 VITALS
WEIGHT: 118.1 LBS | HEART RATE: 104 BPM | SYSTOLIC BLOOD PRESSURE: 118 MMHG | OXYGEN SATURATION: 98 % | TEMPERATURE: 98.4 F | RESPIRATION RATE: 24 BRPM | BODY MASS INDEX: 20.27 KG/M2 | DIASTOLIC BLOOD PRESSURE: 70 MMHG

## 2021-06-03 VITALS — HEIGHT: 64 IN | WEIGHT: 119.6 LBS | BODY MASS INDEX: 20.42 KG/M2

## 2021-06-03 VITALS
OXYGEN SATURATION: 99 % | SYSTOLIC BLOOD PRESSURE: 100 MMHG | HEART RATE: 81 BPM | WEIGHT: 123.6 LBS | BODY MASS INDEX: 21.22 KG/M2 | DIASTOLIC BLOOD PRESSURE: 60 MMHG

## 2021-06-03 VITALS — WEIGHT: 118.38 LBS | BODY MASS INDEX: 20.32 KG/M2

## 2021-06-04 VITALS — WEIGHT: 126 LBS | BODY MASS INDEX: 21.63 KG/M2

## 2021-06-04 VITALS
OXYGEN SATURATION: 99 % | SYSTOLIC BLOOD PRESSURE: 112 MMHG | WEIGHT: 125.4 LBS | HEIGHT: 64 IN | DIASTOLIC BLOOD PRESSURE: 74 MMHG | HEART RATE: 83 BPM | BODY MASS INDEX: 21.41 KG/M2

## 2021-06-04 VITALS
OXYGEN SATURATION: 98 % | HEART RATE: 89 BPM | WEIGHT: 127 LBS | SYSTOLIC BLOOD PRESSURE: 118 MMHG | BODY MASS INDEX: 21.8 KG/M2 | DIASTOLIC BLOOD PRESSURE: 70 MMHG | TEMPERATURE: 98.9 F

## 2021-06-04 VITALS — HEIGHT: 64 IN | BODY MASS INDEX: 21.17 KG/M2 | WEIGHT: 124 LBS

## 2021-06-04 NOTE — PROGRESS NOTES
Assessment:     1. Concussion with loss of consciousness, initial encounter  cyclobenzaprine (FLEXERIL) 5 MG tablet   2. Viral gastroenteritis  ondansetron (ZOFRAN-ODT) 4 MG disintegrating tablet    naproxen (NAPROSYN) 500 MG tablet       Plan:     1. Viral gastroenteritis  I will renew the following for the patient's gastrointestinal complaints  - ondansetron (ZOFRAN-ODT) 4 MG disintegrating tablet; Take 1-2 tablets (4-8 mg total) by mouth every 8 (eight) hours as needed for nausea (or after vomiting).  Dispense: 10 tablet; Refill: 0  - naproxen (NAPROSYN) 500 MG tablet; Take 1 tablet (500 mg total) by mouth 2 (two) times a day with meals.  Dispense: 60 tablet; Refill: 2    2. Concussion with loss of consciousness, initial encounter  Patient will take Naprosyn as prescribed above 1 tablet with extra strength Tylenol every 12 hours for the post mild to moderate concussive symptoms and scalp tenderness  - cyclobenzaprine (FLEXERIL) 5 MG tablet; Take 1 tablet (5 mg total) by mouth 3 (three) times a day as needed for muscle spasms.  Dispense: 15 tablet; Refill: 1      Subjective:   Patient was going down her steps at her townhouse in the steps form a right angle after the first 2 runners and then angle off on a platform and then do another 90 degrees set of runners to the lower level the patient had her arms full of packages and was wearing her boots and tripped on the second runner off of the first floor level pitched forward and struck her head against the wall.  She indented the sheet rock on the wall the force of her fall she did see stars she was not unconscious she was able to get up and reassemble herself she did get a little lightheaded she did not vomit she was able to go back to work but has had a headache and some persistent shakiness since that time.  She has had no visual complaints.  She did not have a re-exacerbation of her migraine syndrome.  She is had previous neck surgery.  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  Today's exam she has a small hematoma above her hairline her fundi were benign neurological exam normal patient has mild postconcussive state.  We are able to treat her bone pain we will treat her with Naprosyn and Tylenol.  She will discontinue her rather constant use of ibuprofen.  I will renew her cyclobenzaprine as she is having some secondary neck spasms.  It is more than 72 hours since this head injury although we did caution her that this type of head blow can cause persistent symptomatology for up to 4 to 6 weeks    Review of Systems: A complete 14 point review of systems was obtained and is negative or as stated in the history of present illness.    Past Medical History:   Diagnosis Date     Allergic Rhinitis     Created by Conversion      Anxiety     Created by Conversion      Arthritis      Asthma      Cervical disc displacement      Depression      Endometriosis      External hemorrhoid      Groin cyst      Heart palpitations     wore holter monitor for 24 hours     History of anesthesia complications     itchy in recovery     Major Depression, Recurrent     Created by Conversion      Mild Persistent Asthma     Created by Conversion      PONV (postoperative nausea and vomiting)      Family History   Problem Relation Age of Onset     No Medical Problems Mother      Lung cancer Father      Stroke Father      Cancer Maternal Grandmother      Cancer Maternal Grandfather      Diabetes Paternal Grandmother      Past Surgical History:   Procedure Laterality Date     CERVICAL FUSION N/A 11/24/2014    Procedure: Anterior Cervical Decompression Fusion Cervical 6 through Cervical 7 Bilateral;  Surgeon: Joselito Wolfe MD;  Location: [facility] [place];  Service:      CERVICAL FUSION Bilateral      CYST REMOVAL      [place]     HEMORROIDECTOMY N/A 7/19/2018    Procedure: HEMORRHOIDECTOMY;  Surgeon: Anette Klein MD;  Location: [facility];  Service:       "HYSTERECTOMY       OOPHORECTOMY Left      NY LAP,DIAGNOSTIC ABDOMEN Left 2/13/2018    Procedure: LAPAROSCOPY LEFT  OOPHORECTOMY;  Surgeon: Letty Floyd DO;  Location: [facility] [place];  Service: Gynecology     NY LIGATE FALLOPIAN TUBE      Description: Tubal Ligation;  Recorded: 11/21/2014;     NY TOTAL ABDOM HYSTERECTOMY      Description: Hysterectomy;  Recorded: 11/21/2014;     TUBAL LIGATION       Social History     Tobacco Use     Smoking status: Former Smoker     Smokeless tobacco: Former User     Quit date: 9/2/2003     Tobacco comment: quit smoking 14 yrs ago   Substance Use Topics     Alcohol use: Yes     Alcohol/week: 1.0 standard drinks     Types: 1 Shots of liquor per week     Comment: rare     Drug use: Yes     Types: Marijuana     Comment: recreational         Objective:   /74   Pulse 83   Ht 5' 4\" (1.626 m)   Wt 125 lb 6.4 oz (56.9 kg)   SpO2 99%   BMI 21.52 kg/m      General Appearance:  Alert, cooperative, no distress  Head:  Normocephalic, small egg sized elevated hematoma above right hairline somewhat tender no ecchymoses  Ears: TM anatomy normal  Eyes:  PERRL, EOM's intact, conjunctiva and corneas clear; fundi were sharp  Nose:  Nares symmetrical, septum midline, mucosa pink, no sinus tenderness  Throat:  Lips, tongue, and mucosa are moist, pink, and intact  Neck:  Supple, symmetrical, trachea midline, no adenopathy; thyroid: no enlargement, symmetric,no tenderness/mass/nodules; no carotid bruit, no JVD  Back:  Symmetrical, no curvature, ROM normal, no CVA tenderness  Chest/Breast:  No mass or tenderness  Lungs:  Clear to auscultation bilaterally, respirations unlabored   Heart:  Normal PMI, regular rate & rhythm, S1 and S2 normal, no murmurs, rubs, or gallops  Abdomen:  Soft, non-tender, bowel sounds active all four quadrants, no mass, or organomegaly  Musculoskeletal:  Tone and strength strong and symmetrical, all extremities  Lymphatic:  No adenopathy  Skin/Hair/Nails:  Skin " warm, dry, and intact, no rashes  Neurologic:  Alert and oriented x3, no cranial nerve deficits, normal strength and tone, gait steady  Extremities:  No edema.  Daria's sign negative.    Genitourinary: deferred  Pulses:  Equal bilaterally           This note has been dictated using voice recognition software. Any grammatical or context distortions are unintentional and inherent to the the software.

## 2021-06-05 ENCOUNTER — RECORDS - HEALTHEAST (OUTPATIENT)
Dept: MRI IMAGING | Facility: CLINIC | Age: 36
End: 2021-06-05

## 2021-06-05 VITALS
BODY MASS INDEX: 20.49 KG/M2 | WEIGHT: 119.4 LBS | HEART RATE: 88 BPM | DIASTOLIC BLOOD PRESSURE: 60 MMHG | SYSTOLIC BLOOD PRESSURE: 90 MMHG

## 2021-06-05 DIAGNOSIS — M54.2 CERVICALGIA: ICD-10-CM

## 2021-06-06 NOTE — TELEPHONE ENCOUNTER
RN triage   Call from pt   Pt states she has not been feeling well for 3 days -- lots of nausea and dry heaves -- no vomiting   No appetite   Is drinking fluids well -- and U.O. gd   Had migraine yesterday and Wednesday  -- no migraine today   Not pregnant  Has not checked temperature -- does not think she has a fever   Reviewed home care advice   Pt wants to be seen today -- has used zofran in the past   Transferred to    Lorie Peña RN [facility] RN triage      Reason for Disposition    Unexplained nausea    Nausea is a chronic symptom (recurrent or ongoing AND present > 4 weeks)    Protocols used: NAUSEA-A-AH

## 2021-06-06 NOTE — TELEPHONE ENCOUNTER
"Pt states \"My body started feeling really hot yesterday while at a store.\"  \"Started seeing stars and passed out.\"  \"My fiance' told me I was unresponsive even while standing up.\"  \"Had a glazed look in my eyes.\"    Then, today while at work, pt states \"My eyes started seeing stars again while looking at my computer.\"  No loss of consciousness today.    Pt states \"The only warning was a tingly feeling on top of my head.\"  States \"still not feeling right.\"    Pt currently home.  Agrees to have makayla' accompany her immediately to ED.    Ilda Gallegos RN  [facility] Triage     Reason for Disposition    Fainted > 15 minutes ago and still feels weak or dizzy    Protocols used: [id number]      "

## 2021-06-06 NOTE — TELEPHONE ENCOUNTER
"Last Thursday fainted.    Happened again Friday.    Went to ED but wait was too long.    Saturday 2am fever 102, fever, diarrhea, vomiting, chills.    Sunday did not feels well.    Not currently dizzy.  Just not feeling well.    Says she drinks a lot of water daily.    Peeing per her usual.  Mouth is a little dry.    Feels \"off\" now.    3 diarrhea stools per day.    Eating soup.    Will continue home care, fluids, imodium, gatorade, brat diet.    Caller agrees with care advice given. Agreed to call back if patient has additional symptoms or questions.    Lizet Pink RN [facility] Triage Nurse Advisor        "

## 2021-06-06 NOTE — PROGRESS NOTES
"  Assessment/Plan:     Patient presents to clinic with acute on chronic gastrointestinal complaints.  She complains of both diarrhea and constipation, persistent nausea, unintentional anorexia, daily dry heaving, and generalized distress.  I suspect patient has a viral gastroenteritis today, but coupled with her chronic gastrointestinal complaints, it has patient tearful on exam today.    We chose to tackle the problem by having patient take daily Metamucil to help regulate her bowel movements, daily omeprazole and a high dose in the morning given her history of dry heaving, and Zofran for her acute nausea.  Patient will return in 1 week, as I suspect she will be at her baseline at that point, and we can see what else needs to be done to help manage patient's complaints.    Problem List Items Addressed This Visit     None      Visit Diagnoses     Gastroesophageal reflux disease, esophagitis presence not specified    -  Primary    Relevant Medications    omeprazole (PRILOSEC) 40 MG capsule    Viral gastroenteritis        Relevant Medications    ondansetron (ZOFRAN-ODT) 4 MG disintegrating tablet          Return to clinic 1 week.    Subjective:      Vicki Pierson is a 34 y.o. female who presents to clinic for acute on chronic gastrointestinal concerns.    The acute symptoms began this last Tuesday.  She states she is known for stomach problems, but this is worse than her typical.  She states she \"does not feel good.\"      She endorses: blurry vision, fatigue, nausea, vomiting, diarrhea, constipation. Joint pain, arthritis and back pain. She also has headaches.  Since the symptoms began she has experiences diarrhea proximate 4-5 times per day, but she is also intermittently constipated.  She has been using Sprite for the nausea.  Nothing seems to make it better or worse.  She states she is \"starving\".  She notes significant hypersalivation at the thought of food.  She denies dry mouth.  She denies actual vomiting, " although she will dry heave in the morning.    Patient does use marijuana.  She was told about cyclical vomiting syndrome in the past.  She did trial quitting for 6 months and noted no improvement at that time.    Patient has seen gastroenterology, they performed an EGD and noted some evidence of acid reflux per the patient's report.      Past Medical History, Family History, and Social History reviewed.     Current Outpatient Medications on File Prior to Visit   Medication Sig Dispense Refill     albuterol (PROAIR HFA;PROVENTIL HFA;VENTOLIN HFA) 90 mcg/actuation inhaler Inhale 2 puffs every 6 (six) hours as needed for wheezing. 1 each 0     [DISCONTINUED] cyclobenzaprine (FLEXERIL) 5 MG tablet Take 1-2 tablets (5-10 mg total) by mouth 3 (three) times a day as needed. 30 tablet 0     [DISCONTINUED] cyclobenzaprine (FLEXERIL) 5 MG tablet Take 1 tablet (5 mg total) by mouth 3 (three) times a day as needed for muscle spasms. 15 tablet 1     [DISCONTINUED] naproxen (NAPROSYN) 500 MG tablet Take 1 tablet (500 mg total) by mouth 2 (two) times a day with meals. 60 tablet 2     [DISCONTINUED] ondansetron (ZOFRAN-ODT) 4 MG disintegrating tablet Take 1-2 tablets (4-8 mg total) by mouth every 8 (eight) hours as needed for nausea (or after vomiting). 10 tablet 0     No current facility-administered medications on file prior to visit.        Review of systems is as stated in HPI.  The remainder of the 10 system review is otherwise negative.    Objective:     /70   Pulse 89   Temp 98.9  F (37.2  C)   Wt 127 lb (57.6 kg)   SpO2 98%   BMI 21.80 kg/m   Body mass index is 21.8 kg/m .    Gen: Alert, NAD, appears stated age, normal hygiene   ABD: non-tender to palpation, nondistended  Psych: Tearful    This note has been dictated using voice recognition software. Any grammatical or context distortions are unintentional and inherent to the the software.     Letty Esquivel MD

## 2021-06-06 NOTE — TELEPHONE ENCOUNTER
Reason for Disposition    MILD-MODERATE diarrhea (e.g., 1-6 times / day more than normal)    Protocols used: DIARRHEA-A-OH

## 2021-06-07 NOTE — PATIENT INSTRUCTIONS - HE
How Severe Is Your Acne?: severe Vicki Pierson,    It was a pleasure to see you today at the [facility].     My recommendations after this visit include:    Holter and stress  echo urgently  In person clinic follow-up on 5/4/2020    YOLIE Dillon MD, FACC, [facility]           Is This A New Presentation, Or A Follow-Up?: Follow Up Acne

## 2021-06-07 NOTE — PROGRESS NOTES
ROS: Shortness of breath, Irregular heartbeat, daytime sleepiness, dizziness, depression.  All other ROS is WNL

## 2021-06-07 NOTE — PROGRESS NOTES
"Vicki Pierson is a 34 y.o. female who is being evaluated via a billable telephone visit.      The patient has been notified of following:     \"This telephone visit will be conducted via a call between you and your physician/provider. We have found that certain health care needs can be provided without the need for a physical exam.  This service lets us provide the care you need with a short phone conversation.  If a prescription is necessary we can send it directly to your pharmacy.  If lab work is needed we can place an order for that and you can then stop by our lab to have the test done at a later time.    If during the course of the call the physician/provider feels a telephone visit is not appropriate, you will not be charged for this service.\"     Patient has given verbal consent to a Telephone visit? Yes    Vicki Pierson complains of    Chief Complaint   Patient presents with     Migraine       I have reviewed and updated the patient's Past Medical History, Social History, Family History and Medication List.    ALLERGIES  Tramadol; Venom-honey bee; and Venlafaxine    Additional provider notes: Patient presents to the clinic for virtual visit.  Patient has a history of migraine headaches and saw neurology in the past.  She tried nortriptyline with no relief.  She was also prescribed Maxalt which she found no relief from.  Patient states over the past month, she has had worsening headaches.  She will wake up with a headache at the back of her neck and this radiates to the front of her head.  This will last for 3 days.  She has not had any recent cold symptoms including rhinorrhea, postnasal drainage, cough, stomachache, fever.  She does occasionally have nausea with a headache.  She denies caffeine use.  She denies numbness and tingling of her extremities and muscular weakness.  Movement of the neck exacerbates the headache.  Taking ibuprofen provides assistance.  She has also tried cyclobenzaprine " recently.  She denies photophobia.  Sound is bothersome for her.    Assessment/Plan:  1. Other migraine without status migrainosus, not intractable  Discussed treatment options.  Will start topiramate 25 mg at bedtime.  Will increase to 50 mg after 1 week.  Educated on its indications and side effects including concerns for possible weight loss or mood swings.  If she has side effects, she is to notify me.  I will reach out to her in 2 weeks to see if symptoms are improving.  Discussed symptomatic treatment including good hydration, decreasing stress, exercising.  Discussed avoidance of NSAIDs to prevent rebound headaches.  - topiramate (TOPAMAX) 25 MG tablet; Take 1 tablet (25 mg total) by mouth at bedtime. Increase to 2 tablets at bedtime after 1 week.  Dispense: 60 tablet; Refill: 0    2. Nausea  Patient will continue Zofran as needed for nausea with her headaches.  She is to notify me of worsening symptoms.  Discussed red flag symptoms including worst headache she is ever had, vomiting, numbness and tingling of her extremities, muscular weakness, facial drooping.  If these occur, suggest ER evaluation.  She is content with the plan.  - ondansetron (ZOFRAN-ODT) 4 MG disintegrating tablet; Take 1-2 tablets (4-8 mg total) by mouth every 8 (eight) hours as needed for nausea (or after vomiting).  Dispense: 30 tablet; Refill: 1        Phone call duration:  12 minutes    Fernanda Camarillo, [facility]

## 2021-06-07 NOTE — TELEPHONE ENCOUNTER
Pt calls reporting mild chest pounding/flutter, lightheadedness, and need to catch breath because can't breathe normally; states she has to pause to breathe. Started happening again today. Onset 3/4 days ago.    Slight wheezing due to asthma history. Symptoms are intermittent but progressing. Shortness of breath at rest. Fine when lying down, no retractions. No past history of cardiac or lung history. History of smoking 16 years ago. No chance of pregnancy, had hysterectomy.     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     Pt will do [facility] visit at this time. Advised to call back if symptoms worsen.      COVID 19 Nurse Triage Plan/Patient Instructions    Please be aware that novel coronavirus (COVID-19) may be circulating in the community. If you develop symptoms such as fever, cough, or SOB or if you have concerns about the presence of another infection including coronavirus (COVID-19), please contact your health care provider or visit [email or web address].     Disposition/Instructions    Patient to have an [facility] Visit with a provider (Preferred option). Follow System Ambulatory Workflow for COVID 19. It is recommended that you setup an [facility] Visit with one of our virtual providers.  To do this follow these instructions:    1. Go to the website [email or web address]  2. Create an account (you will need your insurance information)  3. Start a new visit  4. Choose your diagnosis (e.g. COVID19)  5. Fill out the information about your symptoms  6. A provider will reach out to you by text, phone call or video visit based on your request    Call Back If: Your symptoms worsen before you are able to complete your [facility] Visit with a provider.    Thank you for limiting contact with others, wearing a simple mask to cover your cough, practice good hand hygiene habits and accessing our virtual services where possible to limit the spread of this virus.    For more information about COVID19 and options for  caring for yourself at home, please visit the CDC website at https://www.cdc.gov/coronavirus/2019-ncov/about/steps-when-sick.html  For more options for care at [facility], please visit our website at [email or web address]    For more information, please use the [place] Department of Health ([facility]) COVID-19 Hotlines (Interpreters available):     Health questions: Phone Number: [phone] or [phone] and Hours: 7 a.m. to 7 p.m.    Schools and  questions: Phone Number: [phone] or [phone] and Hours 7 a.m. to 7 p.m.      Reason for Disposition    MILD difficulty breathing (e.g., minimal/no SOB at rest, SOB with walking, pulse < 100) of new onset or worse than normal    MILD difficulty breathing (e.g., minimal/no SOB at rest, SOB with walking, pulse <100)    Protocols used: BREATHING DIFFICULTY-A-OH, CORONAVIRUS (COVID-19) DIAGNOSED OR [id number] 3.30.20    Deven Nolasco RN

## 2021-06-07 NOTE — PROGRESS NOTES
"Vicki Pierson is a 34 y.o. female who is being evaluated via a billable telephone visit.      The patient has been notified of following:     \"This telephone visit will be conducted via a call between you and your physician/provider. We have found that certain health care needs can be provided without the need for a physical exam.  This service lets us provide the care you need with a short phone conversation.  If a prescription is necessary we can send it directly to your pharmacy.  If lab work is needed we can place an order for that and you can then stop by our lab to have the test done at a later time.    Telephone visits are billed at different rates depending on your insurance coverage. During this emergency period, for some insurers they may be billed the same as an in-person visit.  Please reach out to your insurance provider with any questions.    If during the course of the call the physician/provider feels a telephone visit is not appropriate, you will not be charged for this service.\"    Patient has given verbal consent to a Telephone visit? Yes    Patient would like to receive their AVS by AVS Preference: Elyssa.    Additional provider notes:     Assessment and Plan:     1. Palpitations  HM2(CBC w/o Differential)    Basic Metabolic Panel    Thyroid Cascade    Holter Monitor    Ambulatory referral to Cardiology   2. Shortness of breath     3. Anxiety     4. Elevated hemoglobin (H)       Differentials include electrolyte imbalance, arrhythmia, thyroid disease, anxiety.  Patient has discontinued all medications and is just taking Zofran as needed.  We will have her follow-up for lab appointment to recheck hemogram, BMP, thyroid cascade.  Will obtain Holter monitor to evaluate palpitations and refer to cardiology for further evaluation.  Discussed concerning symptoms including chest pain, shortness of breath with exertion, chest tightness.  If these occur, suggest ER evaluation.  Discussed avoidance of " caffeine and smoking.  She is content with the plan.    Subjective:     Vicki is a 34 y.o. female presenting for a phone visit.  She is concerned of ongoing palpitations.  Over the past week, she has been experiencing irregular heartbeats with shortness of breath and dizziness. Patient states the palpations are occurring once every 2 minutes.  Palpitations last for 10 to 15 seconds.  Patient does not feel as though her heart is racing.  Occasionally she feels a pounding and fluttering sensation within her chest.  Her chest will feel heavy when this occurs.  Patient states 1 month ago, she fainted while watching her daughter get her lip pierced.  She also had another episode of fainting when she was sitting at her desk at work a few weeks ago.  Patient states her heart was in an abnormal rhythm when this occurred.  She did not lose consciousness.  She describes the sensation as blacking out for a few seconds.  She denies any caffeine use.  She has not been smoking.  She denies drug use.  She does admit to added stress as she was laid off recently and her father passed away recently.  Patient presented to urgent care on 4/17/2020 where her EKG showed sinus rhythm with occasional PVC, possible left atrial enlargement, and right axis deviation.  CBC with differential showed a white blood count at 8.7, hemoglobin 16.1.  Blood sugar was 101.  She did not have an electrolyte imbalance.  She was diagnosed with PVCs and psychosocial stressors.  It was recommended for her to avoid dehydration, excessive caffeine and lack of sleep.  She is to follow-up with her PCP.    Review of Systems: A complete 14 point review of systems was obtained and is negative or as stated in the history of present illness.    Social History     Socioeconomic History     Marital status: Single     Spouse name: Not on file     Number of children: Not on file     Years of education: Not on file     Highest education level: Not on file   Occupational  History     Not on file   Social Needs     Financial resource strain: Not on file     Food insecurity     Worry: Not on file     Inability: Not on file     Transportation needs     Medical: Not on file     Non-medical: Not on file   Tobacco Use     Smoking status: Former Smoker     Smokeless tobacco: Former User     Quit date: 9/2/2003     Tobacco comment: quit smoking 14 yrs ago   Substance and Sexual Activity     Alcohol use: Yes     Alcohol/week: 1.0 standard drinks     Types: 1 Shots of liquor per week     Comment: rare     Drug use: Yes     Types: Marijuana     Comment: recreational     Sexual activity: Not on file   Lifestyle     Physical activity     Days per week: Not on file     Minutes per session: Not on file     Stress: Not on file   Relationships     Social connections     Talks on phone: Not on file     Gets together: Not on file     Attends Quaker service: Not on file     Active member of club or organization: Not on file     Attends meetings of clubs or organizations: Not on file     Relationship status: Not on file     Intimate partner violence     Fear of current or ex partner: Not on file     Emotionally abused: Not on file     Physically abused: Not on file     Forced sexual activity: Not on file   Other Topics Concern     Not on file   Social History Narrative     Not on file       Active Ambulatory Problems     Diagnosis Date Noted     Allergic Rhinitis      Asthma      Endometriosis      Major Depression, Recurrent      Anxiety      Tension-type Headache      Abnormal Blood Chemistry      Torticollis      Neck Pain      Cervical radiculopathy 11/24/2014     Abnormal weight loss 01/22/2015     S/P partial hysterectomy 03/09/2015     Pelvic pain 02/13/2018     Left ovarian cyst 02/13/2018     Resolved Ambulatory Problems     Diagnosis Date Noted     External Hemorrhoids      Lump In / On The Skin      Upper Respiratory Infection      Palpitations      Atypical Chest Pain      Hematemesis  03/09/2015     Asthma with acute exacerbation 05/15/2010     Past Medical History:   Diagnosis Date     Allergic Rhinitis      Anxiety      Arthritis      Cervical disc displacement      Depression      Endometriosis      External hemorrhoid      Groin cyst      Heart palpitations      History of anesthesia complications      Major Depression, Recurrent      Mild Persistent Asthma      PONV (postoperative nausea and vomiting)        Family History   Problem Relation Age of Onset     No Medical Problems Mother      Lung cancer Father      Stroke Father      Cancer Maternal Grandmother      Cancer Maternal Grandfather      Diabetes Paternal Grandmother        Objective:     There were no vitals taken for this visit.    Patient sounds alert, is speaking clearly.  She does not sound short of breath.         Phone call duration:  13 minutes    Fernanda Camarillo, [facility]

## 2021-06-07 NOTE — TELEPHONE ENCOUNTER
"Patient calls today reporting intermittent heart palpitations. She was seen in urgent care last Wednesday 4/15 for the same symptoms. The notes from that visit state that patient was \"stable.\" VS and labs were stable at the time though I am unable to view the EKG results in the chart.    Patient states she feels these palpitations more frequently.  The palpitations last \"a few seconds.\" She also states she has some dizziness/lightheadedness during these episodes and it makes her chest felt tight. Each episode lasts about 10 seconds. She states it happens about every 3-4 minutes.     Protocol recommends patient be seen today. Telephone visit scheduled this afternoon at 1330 with Sophie Gibson.    Katie Stokes RN      Reason for Disposition    Heart beating very rapidly (e.g., > 140 / minute) and not present now (EXCEPTION: during exercise)    Answer Assessment - Initial Assessment Questions  1. DESCRIPTION: \"Please describe your heart rate or heart beat that you are having\" (e.g., fast/slow, regular/irregular, skipped or extra beats, \"palpitations\")      Episodes of heart palpitations where heart beats really fast.  2. ONSET: \"When did it start?\" (Minutes, hours or days)       1 week ago.  3. DURATION: \"How long does it last\" (e.g., seconds, minutes, hours)      Each episode lasts about 10 seconds.  4. PATTERN \"Does it come and go, or has it been constant since it started?\"  \"Does it get worse with exertion?\"   \"Are you feeling it now?\"      Intermittent. Feels the palpitations every few minutes.  6. HEART RATE: \"Can you tell me your heart rate?\" \"How many beats in 15 seconds?\"  (Note: not all patients can do this)        Patient unable to measure heart rate.   8. CAUSE: \"What do you think is causing the palpitations?\"      Uncertain  9. CARDIAC HISTORY: \"Do you have any history of heart disease?\" (e.g., heart attack, angina, bypass surgery, angioplasty, arrhythmia)       Denies  10. OTHER SYMPTOMS: \"Do you have " "any other symptoms?\" (e.g., dizziness, chest pain, sweating, difficulty breathing)       Dizziness and chest tightness during episodes.    Protocols used: HEART RATE AND HEARTBEAT [id number]      "

## 2021-06-08 NOTE — PATIENT INSTRUCTIONS - HE
Vicki Pierson,    It was a pleasure to see you today at the [facility].     My recommendations after this visit include:    Start propranolol for heart racing  In person follow-up in 2 to 3 weeks    YOLIE Dillon MD, FACC, TABBY

## 2021-06-08 NOTE — TELEPHONE ENCOUNTER
Left message to call back for: Garrison  Information to relay to patient:  LDM for ptPetar, we could recommend her to see  [facility] if she would like to be seen in [place] or she could contact her ins to see who is in-network with her policy for a facility closer to her home

## 2021-06-08 NOTE — TELEPHONE ENCOUNTER
Wellness Screening Tool  Symptom Screening:  Do you have one of the following NEW symptoms:    Fever (subjective or >100.0)?  No    A new cough?  No    Shortness of breath?  No     Chills? No     New loss of taste or smell? No     Generalized body aches? No     New persistent headache? No     New sore throat? No     Nausea, vomiting, or diarrhea?  No    Within the past 3 weeks, have you been exposed to someone with a known positive illness below:    COVID-19 (known or suspected)?  No    Chicken pox?  No    Mealses?  No    Pertussis?  No    Patient notified of visitor restrictions: Yes  Pt informed to wear a mask: Yes  Pt notified if they develop any symptoms listed above, prior to their appointment, they are to call the clinic directly at [phone] for further instructions.  Yes  Patient's appointment status: Patient will be seen in clinic as scheduled on 6/12 0930

## 2021-06-08 NOTE — PROGRESS NOTES
Review of Systems - History obtained from the patient  General ROS: negative  Psychological ROS: positive for - anxiety and depression  Ophthalmic ROS: negative  ENT ROS: negative  Hematological and Lymphatic ROS: negative  Respiratory ROS: positive for - shortness of breath  Cardiovascular ROS: positive for - palpitations and shortness of breath with activity  Gastrointestinal ROS: positive for - constipation, diarrhea, heartburn and nausea  Genito-Urinary ROS: negative  Musculoskeletal ROS: positive for - joint pain and muscle pain  Neurological ROS: positive for - dizziness and daytime sleepiness  Dermatological ROS: negative

## 2021-06-08 NOTE — TELEPHONE ENCOUNTER
Who is calling:  Patient   Reason for Call:  Caller is needing a different location for the heart care. And that she could not schedule anything due to it being to far out.   Date of last appointment with primary care:   Okay to leave a detailed message: Yes

## 2021-06-08 NOTE — PROGRESS NOTES
"Vicki Pierson is a 34 y.o. female who is being evaluated via a billable video visit.      The patient has been notified of following:     \"This video visit will be conducted via a call between you and your physician/provider. We have found that certain health care needs can be provided without the need for an in-person physical exam.  This service lets us provide the care you need with a video conversation.  If a prescription is necessary we can send it directly to your pharmacy.  If lab work is needed we can place an order for that and you can then stop by our lab to have the test done at a later time.    Video visits are billed at different rates depending on your insurance coverage. Please reach out to your insurance provider with any questions.    If during the course of the call the physician/provider feels a video visit is not appropriate, you will not be charged for this service.\"    Patient has given verbal consent to a Video visit? Yes    Patient would like to receive their AVS by AVS Preference: Elyssa.    Patient would like the video invitation sent by: Text to cell phone: [phone]    Will anyone else be joining your video visit? No        Video Start Time: 10:42 AM    Additional provider notes:  Assessment and Plan:     1. Neck pain  Ambulatory referral to Spine Care    cyclobenzaprine (FLEXERIL) 5 MG tablet   2. Nausea  ondansetron (ZOFRAN-ODT) 4 MG disintegrating tablet     Differentials include myofascial pain, bulging or herniated disk.  Discussed symptomatic treatment including rest, ice, stretching activities.  She has found relief with cyclobenzaprine and requested a refill of this.  She would like to see the spine clinic for evaluation and possible imaging.  She continues Zofran as needed for nausea.  She is content with the plan.     Subjective:     Vicki is a 34 y.o. female presenting for a video visit.  Patient has a history of chronic neck pain s/p fusion.  Patient developed left " sided neck pain two days ago.  She describes the pain as a constant dull ache which is exacerbated with movement.  She denies numbness and tingling of her extremities.  She denies any recent injury.  She has had difficulty sleeping due to the pain.  She took cyclobenzaprine last night which provided relief.  She is interested in PT and acupuncture.  She tends to experience nausea when she has pain.     Review of Systems: A complete 14 point review of systems was obtained and is negative or as stated in the history of present illness.    Social History     Socioeconomic History     Marital status: Single     Spouse name: Not on file     Number of children: Not on file     Years of education: Not on file     Highest education level: Not on file   Occupational History     Not on file   Social Needs     Financial resource strain: Not on file     Food insecurity     Worry: Not on file     Inability: Not on file     Transportation needs     Medical: Not on file     Non-medical: Not on file   Tobacco Use     Smoking status: Former Smoker     Smokeless tobacco: Former User     Quit date: 9/2/2003     Tobacco comment: quit smoking 14 yrs ago   Substance and Sexual Activity     Alcohol use: Yes     Alcohol/week: 1.0 standard drinks     Types: 1 Shots of liquor per week     Comment: rare     Drug use: Yes     Types: Marijuana     Comment: recreational     Sexual activity: Not on file   Lifestyle     Physical activity     Days per week: Not on file     Minutes per session: Not on file     Stress: Not on file   Relationships     Social connections     Talks on phone: Not on file     Gets together: Not on file     Attends Mosque service: Not on file     Active member of club or organization: Not on file     Attends meetings of clubs or organizations: Not on file     Relationship status: Not on file     Intimate partner violence     Fear of current or ex partner: Not on file     Emotionally abused: Not on file     Physically  abused: Not on file     Forced sexual activity: Not on file   Other Topics Concern     Not on file   Social History Narrative     Not on file       Active Ambulatory Problems     Diagnosis Date Noted     Allergic Rhinitis      Asthma      Endometriosis      Major Depression, Recurrent      Anxiety      Tension-type Headache      Abnormal Blood Chemistry      Torticollis      Neck Pain      Palpitations      Precordial pain      Cervical radiculopathy 11/24/2014     Abnormal weight loss 01/22/2015     S/P partial hysterectomy 03/09/2015     Pelvic pain 02/13/2018     Left ovarian cyst 02/13/2018     Resolved Ambulatory Problems     Diagnosis Date Noted     External Hemorrhoids      Lump In / On The Skin      Upper Respiratory Infection      Hematemesis 03/09/2015     Asthma with acute exacerbation 05/15/2010     Past Medical History:   Diagnosis Date     Allergic Rhinitis      Anxiety      Arthritis      Cervical disc displacement      Depression      Endometriosis      External hemorrhoid      Groin cyst      Heart palpitations      History of anesthesia complications      Major Depression, Recurrent      Mild Persistent Asthma      PONV (postoperative nausea and vomiting)        Family History   Problem Relation Age of Onset     No Medical Problems Mother      Lung cancer Father      Stroke Father      Cancer Maternal Grandmother      Cancer Maternal Grandfather      Diabetes Paternal Grandmother        Objective:     There were no vitals taken for this visit.       GENERAL: Healthy, alert and no distress  EYES: Eyes grossly normal to inspection. No discharge or erythema, or obvious scleral/conjunctival abnormalities.  HENT: Normal cephalic/atraumatic.  External ears, nose and mouth without ulcers or lesions. No nasal drainage visible.  NECK: No asymmetry, masses or scars  RESP: No audible wheeze, cough, or visible cyanosis.  No visible retractions or increased work of breathing.    MS: She has difficuty moving her  neck to the left.  She is able to move approximately 30 degrees.    SKIN: Visible skin clear. No significant rash, abnormal pigmentation or lesions.  NEURO: Cranial nerves grossly intact. Mentation and speech appropriate for age.  PSYCH: Mentation appears normal, affect normal/bright, judgement and insight intact, normal speech and appearance well-groomed      Video-Visit Details    Type of service:  Video Visit    Video End Time (time video stopped): 10:51 PM   Originating Location (pt. Location): Home    Distant Location (provider location):  [facility]/OB     Platform used for Video Visit: Evgeny Gibson, CNP

## 2021-06-09 NOTE — PROGRESS NOTES
Vicki is a 31 y.o. female presenting to the clinic for concerns for ongoing lumbar pain.  Patient was evaluated on 2/24/17 where she was treated with Medrol and methocarbamol.  Patient found minimal improvement with the medication.  She continues to experience a constant throb within her left lower lumbar region.  Walking exacerbates the pain.  She has been massaging the area.  Occasionally, she has a tingling sensation within her left leg.  She has been taking over-the-counter ibuprofen.  She has had intermittent pain since May 2016.  She is also concerned of a possible vaginal infection.  2 days ago, she developed vaginal odor and vaginal discharge which is white in color.  She has noticed some mild itching.  She has been a relationship for 2 years and has no concerns for STDs.  She has not tried any over-the-counter products for her symptoms.    Review of Systems: A complete 14 point review of systems was obtained and is negative or as stated in the history of present illness.    Social History     Social History     Marital status: Single     Spouse name: N/A     Number of children: N/A     Years of education: N/A     Occupational History     Not on file.     Social History Main Topics     Smoking status: Former Smoker     Smokeless tobacco: Former User     Quit date: 9/2/2003      Comment: quit smoking 11 1/2 yrs ago     Alcohol use No     Drug use: No     Sexual activity: Not on file     Other Topics Concern     Not on file     Social History Narrative       Active Ambulatory Problems     Diagnosis Date Noted     Allergic Rhinitis      Mild Persistent Asthma      Endometriosis      Major Depression, Recurrent      Anxiety      Tension-type Headache      Abnormal Blood Chemistry      Torticollis      Neck Pain      Cervical radiculopathy 11/24/2014     Abnormal weight loss 01/22/2015     S/P partial hysterectomy 03/09/2015     Resolved Ambulatory Problems     Diagnosis Date Noted     External Hemorrhoids       "Lump In / On The Skin      Upper Respiratory Infection      Palpitations      Atypical Chest Pain      Hematemesis 03/09/2015     Past Medical History:   Diagnosis Date     Allergic Rhinitis      Anxiety      Asthma      Cervical disc displacement      Depression      Endometriosis      External hemorrhoid      Groin cyst      Heart palpitations      Major Depression, Recurrent      Mild Persistent Asthma        Family History   Problem Relation Age of Onset     No Medical Problems Mother      Lung cancer Father      Stroke Father      Cancer Maternal Grandmother      Cancer Maternal Grandfather      Diabetes Paternal Grandmother        OBJECTIVE:     Visit Vitals     /60 (Patient Site: Left Arm, Patient Position: Sitting, Cuff Size: Adult Regular)     Pulse 74     Ht 5' 4\" (1.626 m)     Wt 118 lb 8 oz (53.8 kg)     SpO2 98%     BMI 20.34 kg/m2       Patient is alert, in no obvious distress.   Skin: Warm, dry.    Lungs:  Clear to auscultation. Respirations even and unlabored.  No wheezing or rales noted.   Heart:  Regular rate and rhythm.  No murmurs, S3, S4, gallops, or rubs.    Abdomen: Soft, nontender.  No organomegaly. Bowel sounds normoactive. No guarding or masses noted.   :  External genitalia normal.  Normal vaginal mucosa.  Slight white vaginal discharge is present.   Musculoskeletal: She has full range of motion bending the waist.  She has no difficulty twisting and turning.  She is able to heel and toe walk without difficulty.  Patellar and Achilles reflexes +2.  Straight leg raise is negative bilaterally.    ASSESSMENT AND PLAN:     1. Chronic left-sided low back pain without sciatica  Due to chronic symptoms, will obtain MRI for further evaluation.  She will use lorazepam prior to the MRI.  Provide a prescription for cyclobenzaprine to be used as needed.  She is to avoid taking this with other sedatives including previously prescribed muscle relaxants.  - MR Lumbar Spine Without Contrast; " Future  - LORazepam (ATIVAN) 0.5 MG tablet; Take 1-2 tablets by mouth 1/2 hour to 1 hour prior to procedure.  Dispense: 2 tablet; Refill: 0  - Ambulatory referral to Spine Care  - cyclobenzaprine (FLEXERIL) 5 MG tablet; Take 1-2 tablets (5-10 mg total) by mouth 3 (three) times a day as needed.  Dispense: 30 tablet; Refill: 0    2. Bacterial vaginosis  Patient found relief with Tinidazole on the past.  Provided a prescription for this.  She will follow-up if symptoms persist or worsen.  - tinidazole (TINDAMAX) 500 MG tablet; Take 2 tablets (1,000 mg total) by mouth daily for 5 days.  Dispense: 10 tablet; Refill: 0    3. Vaginal discharge  - Wet Prep, Vaginal

## 2021-06-09 NOTE — PROGRESS NOTES
Clinic Care Coordination Contact  Community Health Worker Initial Outreach    CHW Initial Information Gathering:  Referral Source: Care Team  Preferred Urgent Care: [facility], [phone]  Current living arrangement:: I live in a private home  Informal Support system:: Significant other  No PCP office visit in Past Year: No  CHW Additional Questions  Seanhart active?: Yes    Patient accepts CC: Yes. Patient scheduled for assessment with Rebecca  on 7/9/2020 at 1pm. Patient noted desire to discuss her health .     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  She was going to get  on July 18th until COVID happened. They are looking at having their wedding next year.

## 2021-06-09 NOTE — PROGRESS NOTES
Clinic Care Coordination Contact  [facility]/Voicemail    Pt was not reachable for initial assessment.      Clinical Data: Care Coordinator Outreach  Outreach attempted x 1.  Left message on patient's voicemail with call back information and requested return call.  2nd attempt made later in the day and phone busy    Plan:   CHW please reschedule if pt still interested in care coordination

## 2021-06-09 NOTE — PROGRESS NOTES
Clinic Care Coordination Contact  [facility]/Voicemail       Clinical Data: Care Coordinator Outreach  Outreach attempted x 1.  Left message on patient's voicemail with call back information and requested return call.  Plan:  Care Coordinator will try to reach patient again in 1-2 business days.      NOE AMEZQUITA 7/10/2020

## 2021-06-09 NOTE — PROGRESS NOTES
Clinic Care Coordination Contact  [facility]/Voicemail       Clinical Data: Care Coordinator Outreach  Outreach attempted x 2.  Left message on patient's voicemail with call back information and requested return call.  Plan: Care Coordinator will send care coordination introduction letter with care coordinator contact information and explanation of care coordination services via [employer]. Care Coordinator will do no further outreaches at this time.

## 2021-06-09 NOTE — PROGRESS NOTES
Vicki is a 31 y.o. female presenting to the clinic for concerns for lumbar pain for 2 days.  She feels the pain primarily with her left lower lumbar region.  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  She is limping due to pain.  She describes the pain as a constant ache.  Movement exacerbates her symptoms.  She denies any radicular symptoms.  She has not had any urinary or fecal incontinence/retention.  She denies any injury.  She denies numbness and tingling of her extremities.  She has been taking ibuprofen as needed.    Review of Systems: A complete 14 point review of systems was obtained and is negative or as stated in the history of present illness.    Social History     Social History     Marital status: Single     Spouse name: N/A     Number of children: N/A     Years of education: N/A     Occupational History     Not on file.     Social History Main Topics     Smoking status: Former Smoker     Smokeless tobacco: Former User     Quit date: 9/2/2003      Comment: quit smoking 11 1/2 yrs ago     Alcohol use No     Drug use: No     Sexual activity: Not on file     Other Topics Concern     Not on file     Social History Narrative       Active Ambulatory Problems     Diagnosis Date Noted     Allergic Rhinitis      Mild Persistent Asthma      Endometriosis      Major Depression, Recurrent      Anxiety      Tension-type Headache      Abnormal Blood Chemistry      Torticollis      Neck Pain      Cervical radiculopathy 11/24/2014     Abnormal weight loss 01/22/2015     S/P partial hysterectomy 03/09/2015     Resolved Ambulatory Problems     Diagnosis Date Noted     External Hemorrhoids      Lump In / On The Skin      Upper Respiratory Infection      Palpitations      Atypical Chest Pain      Hematemesis 03/09/2015     Past Medical History:   Diagnosis Date     Allergic Rhinitis      Anxiety      Asthma      Cervical disc displacement      Depression      Endometriosis   "    External hemorrhoid      Groin cyst      Heart palpitations      Major Depression, Recurrent      Mild Persistent Asthma        Family History   Problem Relation Age of Onset     No Medical Problems Mother      Lung cancer Father      Stroke Father      Cancer Maternal Grandmother      Cancer Maternal Grandfather      Diabetes Paternal Grandmother        OBJECTIVE:     Visit Vitals     /60 (Patient Site: Right Arm, Patient Position: Sitting, Cuff Size: Adult Regular)     Pulse 74     Ht 5' 4\" (1.626 m)     Wt 118 lb 4.8 oz (53.7 kg)     SpO2 100%     BMI 20.31 kg/m2       Patient is alert, in no obvious distress.   Skin: Warm, dry.    Lungs:  Clear to auscultation. Respirations even and unlabored.  No wheezing or rales noted.   Heart:  Regular rate and rhythm.  No murmurs.   Musculoskeletal:  Full ROM of extremities.  She is able to heel and toe walk without difficulty.  Patellar and Achilles reflexes +2.  Straight leg raise negative bilaterally. She is tender to palpation of her left lower lumbar paraspinous muscles.     ASSESSMENT AND PLAN:     1. Acute left-sided low back pain without sciatica  Discussed symptomatic treatment including rest, ice, stretching activities.  Will treat with Medrol and Methocarbamol.  Educated on its indications and side effects.  Patient is to avoid other sedatives with Methocarbamol.  She will follow-up if symptoms persist or worsen.   The patient is content with the plan.   - methocarbamol (ROBAXIN) 500 MG tablet; Take 1 tablet (500 mg total) by mouth 3 (three) times a day. As needed  Dispense: 30 tablet; Refill: 0  - methylPREDNISolone (MEDROL DOSEPACK) 4 mg tablet; follow package directions  Dispense: 21 tablet; Refill: 0  I spent 25 minutes with the patient with greater than 50% spent discussing symptoms, treatment options, and coordination of care.       "

## 2021-06-10 NOTE — PROGRESS NOTES
Assessment:     Vicki was seen today for vaginal odor.    Diagnoses and all orders for this visit:    Bacterial vaginosis  -     Wet Prep, Vaginal  -     clindamycin (CLEOCIN) 300 MG capsule; Take one tablet by mouth twice daily for 7 days.  -     ondansetron (ZOFRAN, AS HYDROCHLORIDE,) 4 MG tablet; Take 1 tablet (4 mg total) by mouth daily as needed for nausea.    Other orders  -     Discontinue: amoxicillin (AMOXIL) 500 MG capsule; Take 1 capsule (500 mg total) by mouth 3 (three) times a day for 7 doses.        Plan:     1. Bacterial vaginosis  With test and wet prep positive we will go ahead and treat with her preference which is clindamycin some refills given.  She does get nausea frequently and Zofran to take in case the antibiotic makes her nauseated given  - Wet Prep, Vaginal  - clindamycin (CLEOCIN) 300 MG capsule; Take one tablet by mouth twice daily for 7 days.  Dispense: 14 capsule; Refill: 2  - ondansetron (ZOFRAN, AS HYDROCHLORIDE,) 4 MG tablet; Take 1 tablet (4 mg total) by mouth daily as needed for nausea.  Dispense: 30 tablet; Refill: 1      This is a 25 minute visit with greater than 50% of the time spent counseling regarding history of severe endometriosis and its treatment including eventual hysterectomy discussed with patient.  Sitz bath's 20 minutes 3 times a week with 2 cups of vinegar water also recommended for cleansing and control of nonspecific vaginitis      Subjective:      Gerri is a 31 y.o. female presenting to my clinic for evaluation of discharge.  I was involved with raven and the delivery of her 2 children.  Lili is now 13 years old and Jessa is now 8.  She is with a different partner at this point in time somebody that is a positive influence in her life.  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    She is doing well but gives me history of a hysterectomy.  She was doctoring for abdominal pain with oral birth  control pills and other meds for endometriosis.  This eventually led to laparoscopic surgery and then when that failed hysterectomy.  Her pelvic pain is better but she still gets nausea often in the morning.    She has had recurrent bacterial vaginosis.  She thinks she is got an infection now.  She usually can tell herself.  Will we do a lift test today and a clue cell test.      Current Outpatient Prescriptions on File Prior to Visit   Medication Sig Dispense Refill     cyclobenzaprine (FLEXERIL) 5 MG tablet Take 1-2 tablets (5-10 mg total) by mouth 3 (three) times a day as needed. 30 tablet 0     methylPREDNISolone (MEDROL DOSEPACK) 4 mg tablet follow package directions 21 tablet 0     [DISCONTINUED] clindamycin (CLEOCIN) 300 MG capsule Take one tablet by mouth twice daily for 7 days. 14 capsule 0     No current facility-administered medications on file prior to visit.      Allergies   Allergen Reactions     Venom-Honey Bee Anaphylaxis     Venlafaxine Anxiety and Other (See Comments)     Past Medical History:   Diagnosis Date     Allergic Rhinitis     Created by Conversion      Anxiety     Created by Conversion      Asthma      Cervical disc displacement      Depression      Endometriosis      External hemorrhoid      Groin cyst      Heart palpitations     wore holter monitor for 24 hours     Major Depression, Recurrent     Created by Conversion      Mild Persistent Asthma     Created by Conversion      Past Surgical History:   Procedure Laterality Date     CERVICAL FUSION N/A 11/24/2014    Procedure: Anterior Cervical Decompression Fusion Cervical 6 through Cervical 7 Bilateral;  Surgeon: Joselito Wolfe MD;  Location: [facility] [place];  Service:      CYST REMOVAL      [place]     HYSTERECTOMY       VA LIGATE FALLOPIAN TUBE      Description: Tubal Ligation;  Recorded: 11/21/2014;     VA TOTAL ABDOM HYSTERECTOMY      Description: Hysterectomy;  Recorded: 11/21/2014;     TUBAL LIGATION       Social  "History     Social History     Marital status: Single     Spouse name: N/A     Number of children: N/A     Years of education: N/A     Occupational History     Not on file.     Social History Main Topics     Smoking status: Former Smoker     Smokeless tobacco: Former User     Quit date: 9/2/2003      Comment: quit smoking 11 1/2 yrs ago     Alcohol use No     Drug use: No     Sexual activity: Not on file     Other Topics Concern     Not on file     Social History Narrative     Family History   Problem Relation Age of Onset     No Medical Problems Mother      Lung cancer Father      Stroke Father      Cancer Maternal Grandmother      Cancer Maternal Grandfather      Diabetes Paternal Grandmother        ROS:  I have performed a 10 point ROS.  All pertinent positives and negatives are found in the HPI.  All others are negative.    No fever chills or open sores or any type of skin abnormality.    Objective:     Physical Exam:  /64 (Patient Site: Right Arm, Patient Position: Sitting, Cuff Size: Adult Regular)  Pulse 72  Ht 5' 5\" (1.651 m)  Wt 118 lb 8 oz (53.8 kg)  BMI 19.72 kg/m2  General Appearance: Alert, cooperative, no distress, appears stated age  Head: Normocephalic, without obvious abnormality, atraumatic  .  Abdomen: Soft, non-tender, bowel sounds active all four quadrants,  no masses, no organomegaly   exam  External  is normal/speculum exam shows creamy white discharge/mildly positive whiff test  Positive wet prep    Neurologic: Intact, no focal deficits   Mental status:  Appropriate, Affect normal    Labs: Wet prep is positive for clue cells negative for yeast         "

## 2021-06-10 NOTE — PROGRESS NOTES
"Vicki Pierson is a 34 y.o. female who is being evaluated via a billable video visit.      The patient has been notified of following:     \"This video visit will be conducted via a call between you and your physician/provider. We have found that certain health care needs can be provided without the need for an in-person physical exam.  This service lets us provide the care you need with a video conversation.  If a prescription is necessary we can send it directly to your pharmacy.  If lab work is needed we can place an order for that and you can then stop by our lab to have the test done at a later time.    Video visits are billed at different rates depending on your insurance coverage. Please reach out to your insurance provider with any questions.    If during the course of the call the physician/provider feels a video visit is not appropriate, you will not be charged for this service.\"    Patient has given verbal consent to a Video visit? Yes  How would you like to obtain your AVS? AVS Preference: MyChart.  If dropped by the video visit, the video invitation should be sent to: Text to cell phone: [phone]  Will anyone else be joining your video visit? No        Video Start Time: 9:42 AM     Additional provider notes:  Assessment and Plan:     1. Neck pain  cyclobenzaprine (FLEXERIL) 5 MG tablet   2. Tension headache  Ambulatory referral to Neurology   3. Nausea  ondansetron (ZOFRAN-ODT) 4 MG disintegrating tablet     Patient has a history of chronic tension and migraine headaches.  Her current headaches sound typical of a tension headache.  Will treat with cyclobenzaprine as needed.  Discussed symptomatic treatment including rest, ice, stretching activities of the neck.  Discussed massage and acupuncture as treatment options.  She requests prescription so for Zofran to use as needed for nausea.  Will refer to neurology for further evaluation as she is tried multiple prophylactic medications in the past.  " Discussed other options for headaches including Botox, Aimovig, and modality.  She will speak to the neurologist about this further.  We also discussed limiting ibuprofen and Tylenol as these can cause rebound headaches.  She is content with the plan.    Subjective:     Vicki is a 34 y.o. female presenting for a video visit.  Patient has a history of chronic neck pain and headaches.  She has a history of a C6-C7 fusion.  She has seen neurology in the past.  She has tried topiramate, amitriptyline, propranolol as prophylactic medications.  She is also tried sumatriptan and Maxalt for abortive medications.  Over the past week, she has been experiencing headaches daily.  She states the headaches begin in the neck and radiating around to her bilateral temporal regions.  She has had some nausea without vomiting.  She denies numbness and tingling of her extremities and muscular weakness.  She has not had any recent cold symptoms or fever.  She denies loss of smell taste.  She has been taking ibuprofen.  Muscle relaxants work well for her neck.    Review of Systems: A complete 14 point review of systems was obtained and is negative or as stated in the history of present illness.    Social History     Socioeconomic History     Marital status: Single     Spouse name: Not on file     Number of children: Not on file     Years of education: Not on file     Highest education level: Not on file   Occupational History     Not on file   Social Needs     Financial resource strain: Not on file     Food insecurity     Worry: Not on file     Inability: Not on file     Transportation needs     Medical: Not on file     Non-medical: Not on file   Tobacco Use     Smoking status: Former Smoker     Smokeless tobacco: Former User     Quit date: 9/2/2003     Tobacco comment: quit smoking 14 yrs ago   Substance and Sexual Activity     Alcohol use: Yes     Alcohol/week: 1.0 standard drinks     Types: 1 Shots of liquor per week     Comment: rare      Drug use: Yes     Types: Marijuana     Comment: recreational     Sexual activity: Not on file   Lifestyle     Physical activity     Days per week: Not on file     Minutes per session: Not on file     Stress: Not on file   Relationships     Social connections     Talks on phone: Not on file     Gets together: Not on file     Attends [employer] service: Not on file     Active member of club or organization: Not on file     Attends meetings of clubs or organizations: Not on file     Relationship status: Not on file     Intimate partner violence     Fear of current or ex partner: Not on file     Emotionally abused: Not on file     Physically abused: Not on file     Forced sexual activity: Not on file   Other Topics Concern     Not on file   Social History Narrative     Not on file       Active Ambulatory Problems     Diagnosis Date Noted     Allergic Rhinitis      Asthma      Endometriosis      Major Depression, Recurrent      Anxiety      Tension-type Headache      Abnormal Blood Chemistry      Torticollis      Neck Pain      Palpitations      Precordial pain      Cervical radiculopathy 11/24/2014     Abnormal weight loss 01/22/2015     S/P partial hysterectomy 03/09/2015     Pelvic pain 02/13/2018     Left ovarian cyst 02/13/2018     Resolved Ambulatory Problems     Diagnosis Date Noted     External Hemorrhoids      Lump In / On The Skin      Upper Respiratory Infection      Hematemesis 03/09/2015     Asthma with acute exacerbation 05/15/2010     Past Medical History:   Diagnosis Date     Allergic Rhinitis      Anxiety      Arthritis      Cervical disc displacement      Depression      Endometriosis      External hemorrhoid      Groin cyst      Heart palpitations      History of anesthesia complications      Major Depression, Recurrent      Mild Persistent Asthma      PONV (postoperative nausea and vomiting)        Family History   Problem Relation Age of Onset     No Medical Problems Mother      Lung cancer Father       Stroke Father      Cancer Maternal Grandmother      Cancer Maternal Grandfather      Diabetes Paternal Grandmother        Objective:     There were no vitals taken for this visit.     GENERAL: Healthy, alert and no distress  EYES: Eyes grossly normal to inspection. No discharge or erythema, or obvious scleral/conjunctival abnormalities.  HENT: Normal cephalic/atraumatic.  External ears, nose and mouth without ulcers or lesions. No nasal drainage visible.  NECK: No asymmetry, masses or scars  RESP: No audible wheeze, cough, or visible cyanosis.  No visible retractions or increased work of breathing.    MS: No gross musculoskeletal defects noted.  Normal range of motion. No visible edema.  SKIN: Visible skin clear. No significant rash, abnormal pigmentation or lesions.  NEURO: Cranial nerves grossly intact. Mentation and speech appropriate for age.  PSYCH: Mentation appears normal, affect normal/bright, judgement and insight intact, normal speech and appearance well-groomed      Video-Visit Details    Type of service:  Video Visit    Video End Time (time video stopped): 9:49 AM  Originating Location (pt. Location): Home    Distant Location (provider location):  [facility]/OB     Platform used for Video Visit: Evgeny Gibson CNP
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
## 2021-06-11 NOTE — PROGRESS NOTES
Assessment and Plan:     1. Mouth sore  Differentials include salivary gland obstruction, mucocele, leukoplakia, and swelling from previous trauma.  Will refer to ENT for further evaluation.  Discussed symptomatic treatment. She is content with the plan.  - Ambulatory referral to ENT    Subjective:     Vicki is a 31 y.o. female presenting to the clinic for concerns for an oral sore.  Patient states 1-1/2 months ago she bit her tongue.  She has had a lump develop since.  Patient states it becomes more prominent and swells as the day progresses.  It is erythematous and painful.  She was seen at [facility] urgent care last week where she was treated with Augmentin and a mouthwash.  She has found no relief.  She denies fever.  She has not had recent cold symptoms including rhinorrhea, headache, stomachache, nausea, vomiting, postnasal drainage, and cough.  She has a history of smoking 13 years ago.  She does not chew tobacco.    Review of Systems: A complete 14 point review of systems was obtained and is negative or as stated in the history of present illness.    Social History     Social History     Marital status: Single     Spouse name: N/A     Number of children: N/A     Years of education: N/A     Occupational History     Not on file.     Social History Main Topics     Smoking status: Former Smoker     Smokeless tobacco: Former User     Quit date: 9/2/2003      Comment: quit smoking 11 1/2 yrs ago     Alcohol use No     Drug use: No     Sexual activity: Not on file     Other Topics Concern     Not on file     Social History Narrative       Active Ambulatory Problems     Diagnosis Date Noted     Allergic Rhinitis      Mild Persistent Asthma      Endometriosis      Major Depression, Recurrent      Anxiety      Tension-type Headache      Abnormal Blood Chemistry      Torticollis      Neck Pain      Cervical radiculopathy 11/24/2014     Abnormal weight loss 01/22/2015     S/P partial hysterectomy 03/09/2015     Asthma with  "acute exacerbation 05/15/2010     Resolved Ambulatory Problems     Diagnosis Date Noted     External Hemorrhoids      Lump In / On The Skin      Upper Respiratory Infection      Palpitations      Atypical Chest Pain      Hematemesis 03/09/2015     Past Medical History:   Diagnosis Date     Allergic Rhinitis      Anxiety      Asthma      Cervical disc displacement      Depression      Endometriosis      External hemorrhoid      Groin cyst      Heart palpitations      Major Depression, Recurrent      Mild Persistent Asthma        Family History   Problem Relation Age of Onset     No Medical Problems Mother      Lung cancer Father      Stroke Father      Cancer Maternal Grandmother      Cancer Maternal Grandfather      Diabetes Paternal Grandmother        Objective:     /70 (Patient Site: Right Arm, Patient Position: Sitting, Cuff Size: Adult Regular)  Pulse 69  Ht 5' 4\" (1.626 m)  Wt 117 lb (53.1 kg)  SpO2 98%  BMI 20.08 kg/m2    Patient is alert, in no obvious distress.   Skin: Warm, dry.  No lesions or rashes.  Skin turgor rapid return.   HEENT:  Head normocephalic, atraumatic.  Eyes normal.  Ears normal.  Nose patent, mucosa pink.  Oropharynx mucosa pink.  She has a pea sized white colored oral sore present near her salivary glands under her tongue to the left of the frenulum. No lesions or tonsillar enlargement.   Neck: Supple, no lymphadenopathy.   Lungs:  Clear to auscultation. Respirations even and unlabored.  No wheezing or rales noted.   Heart:  Regular rate and rhythm.  No murmurs, S3, S4, gallops, or rubs.                "

## 2021-06-12 NOTE — PROGRESS NOTES
Subjective:   Vicki Pierson is a 31 y.o. female  Roomed by: Roya      Chief Complaint   Patient presents with     Vaginal Discharge     No DC, just odor.     Emesis     x 2 days     Diarrhea     Headache     Needs work note.   Vaginal odor has been for about 2 days. Had a Hyst in 2014. Admits fevers, chills, vomiting and belly pain. Denies CP or SOB, but admits a little coughing. Requesting a work note for recent stomach virus symptoms.   Review of Systems  Const - GI - see HPI  Allergies   Allergen Reactions     Venom-Honey Bee Anaphylaxis     Venlafaxine Anxiety and Other (See Comments)       Current Outpatient Prescriptions:      ibuprofen (ADVIL,MOTRIN) 200 MG tablet, Take 200 mg by mouth every 6 (six) hours as needed for pain., Disp: , Rfl:      ondansetron (ZOFRAN, AS HYDROCHLORIDE,) 4 MG tablet, Take 1 tablet (4 mg total) by mouth daily as needed for nausea., Disp: 30 tablet, Rfl: 1     clindamycin (CLEOCIN) 300 MG capsule, Take one tablet by mouth twice daily for 7 days., Disp: 14 capsule, Rfl: 2     cyclobenzaprine (FLEXERIL) 5 MG tablet, Take 1-2 tablets (5-10 mg total) by mouth 3 (three) times a day as needed., Disp: 30 tablet, Rfl: 0     hydrOXYzine (ATARAX) 25 MG tablet, Take 1-2 tablets by mouth at bedtime as needed for sleep, Disp: 60 tablet, Rfl: 0     naproxen (NAPROSYN) 500 MG tablet, Take 1 tablet (500 mg total) by mouth 2 (two) times a day with meals., Disp: 30 tablet, Rfl: 0  Patient Active Problem List   Diagnosis     Allergic Rhinitis     Mild Persistent Asthma     Endometriosis     Major Depression, Recurrent     Anxiety     Tension-type Headache     Abnormal Blood Chemistry     Torticollis     Neck Pain     Cervical radiculopathy     Abnormal weight loss     S/P partial hysterectomy     Asthma with acute exacerbation     Medical History Reviewed  Objective:     Vitals:    08/22/17 1212 08/22/17 1214   BP: (!) 89/58 90/60   Pulse: 60    Resp: 12    Temp: 98.3  F (36.8  C)    TempSrc:  Oral    SpO2: 99%    Weight: 115 lb 9.6 oz (52.4 kg)    Gen - Pt in NAD   Exam:  External Genitalia - no masses or ulcerations, no erythema/ induration  Vagina - copious amount of white creamy discharge    Results for orders placed or performed in visit on 08/22/17   Wet Prep, Vaginal   Result Value Ref Range    Yeast Result No yeast seen No yeast seen    Trichomonas No Trichomonas seen No Trichomonas seen    Clue Cells, Wet Prep Clue cells seen (!) No Clue cells seen   Lab result discussed on day of visit.      Assessment - Plan   Medical Decision Making -31-year-old woman presenting with vaginal odor.  Also requested a note for recent symptoms of vomiting, belly pain and diarrhea.  Patient says she already has Zofran for this and is wanting a return to work note for tomorrow.  Wet prep was positive for clue cells and patient was given 1 course of clindamycin which has worked for her in the past.  Recommended the patient follow-up with her primary to see if there is anything she can do to prevent her recurrences.    1. Bacterial vaginosis  - clindamycin (CLEOCIN) 300 MG capsule; Take one tablet by mouth twice daily for 7 days.  Dispense: 14 capsule; Refill: 0    2. Vaginal odor  - Wet Prep, Vaginal    At the conclusion of the encounter, assessment and plan were discussed.   All questions were answered.   The patient or guardian acknowledged understanding and was involved in the decision making regarding the overall care plan.    Patient Instructions   1. Keep well hydrated  2. If symptoms not improved after completing antibiotics, follow up with primary  3. If you have any questions, call the clinic number

## 2021-06-12 NOTE — PROGRESS NOTES
Assessment and Plan:     1. Bacterial vaginosis  metroNIDAZOLE (FLAGYL) 500 MG tablet   2. Vaginal odor  Wet Prep, Vaginal   3. Dysuria  Urinalysis-UC if Indicated     Wet prep is positive for clue cells.  Will treat with metronidazole.  She is instructed to avoid alcohol with this.  Urinalysis is negative.  She will follow-up if symptoms persist worsen.     Subjective:     Vicki is a 31 y.o. female presenting to the clinic for concerns for possibly vaginal infection.  Patient states she noticed a vaginal odor 3 days ago.  She took 3 leftover clindamycin from a previous infection.  She denies vaginal discharge or irritation.  Has had some mild dysuria.  She denies urinary frequency, urinary urgency, low back pain, and fever.  She has no concerns for STDs.  She has been a relationship for 2-1/2 years.  She was treated for bacterial vaginosis in March and May 2017.    Review of Systems: A complete 14 point review of systems was obtained and is negative or as stated in the history of present illness.    Social History     Social History     Marital status: Single     Spouse name: N/A     Number of children: N/A     Years of education: N/A     Occupational History     Not on file.     Social History Main Topics     Smoking status: Former Smoker     Smokeless tobacco: Former User     Quit date: 9/2/2003      Comment: quit smoking 11 1/2 yrs ago     Alcohol use No     Drug use: No     Sexual activity: Not on file     Other Topics Concern     Not on file     Social History Narrative       Active Ambulatory Problems     Diagnosis Date Noted     Allergic Rhinitis      Mild Persistent Asthma      Endometriosis      Major Depression, Recurrent      Anxiety      Tension-type Headache      Abnormal Blood Chemistry      Torticollis      Neck Pain      Cervical radiculopathy 11/24/2014     Abnormal weight loss 01/22/2015     S/P partial hysterectomy 03/09/2015     Asthma with acute exacerbation 05/15/2010     Resolved Ambulatory  Problems     Diagnosis Date Noted     External Hemorrhoids      Lump In / On The Skin      Upper Respiratory Infection      Palpitations      Atypical Chest Pain      Hematemesis 03/09/2015     Past Medical History:   Diagnosis Date     Allergic Rhinitis      Anxiety      Asthma      Cervical disc displacement      Depression      Endometriosis      External hemorrhoid      Groin cyst      Heart palpitations      Major Depression, Recurrent      Mild Persistent Asthma        Family History   Problem Relation Age of Onset     No Medical Problems Mother      Lung cancer Father      Stroke Father      Cancer Maternal Grandmother      Cancer Maternal Grandfather      Diabetes Paternal Grandmother        Objective:     /64 (Patient Site: Right Arm, Patient Position: Sitting, Cuff Size: Adult Regular)  Pulse 71  Wt 113 lb 9.6 oz (51.5 kg)  SpO2 95%  BMI 19.5 kg/m2    Patient is alert, in no obvious distress.   Skin: Warm, dry.    Lungs:  Clear to auscultation. Respirations even and unlabored.  No wheezing or rales noted.   Heart:  Regular rate and rhythm.  No murmurs, S3, S4, gallops, or rubs.    Abdomen: Soft, nontender.  No organomegaly. Bowel sounds normoactive. No guarding or masses noted. CVA tenderness is negative bilaterally.   :  External genitalia normal.  Normal vaginal mucosa.  Slight white thin vaginal discharge is present.

## 2021-06-14 NOTE — PROGRESS NOTES
Chief Complaint   Patient presents with     Sore Throat     x 1 week. vomiting and diarrhea. Also has abdominal pain      History of Present Illness:     Vicki Pierson is a 32 y/o female who presents to [facility] concerned about sore throat    The patient reports 1 week of sore throat associated with hoarseness and  Difficulty swallowing, she states it bothers her when she talks.  Sh also endorses mostly nonproductive and tender, swollen lymph nodes.   The patient also reports  intermittent nausea and had few episodes of emesis  No shortness of breath, chest pain or pressure reported.   No headache, sinus tenderness, facial pain or nasal drainage.  No fevers, chills or rigors.  The patient is up-to-date on his influenza vaccination.  No recent sick contacts.      Review of systems: See history of present illness, otherwise negative.   Current Outpatient Prescriptions   Medication Sig Dispense Refill     clindamycin (CLEOCIN) 300 MG capsule Take one tablet by mouth twice daily for 7 days. 14 capsule 0     cyclobenzaprine (FLEXERIL) 5 MG tablet Take 1-2 tablets (5-10 mg total) by mouth 3 (three) times a day as needed. 30 tablet 0     guaiFENesin (ROBITUSSIN) 100 mg/5 mL syrup Take 10 mL (200 mg total) by mouth 3 (three) times a day as needed for cough.  0     hydrOXYzine (ATARAX) 25 MG tablet Take 1-2 tablets by mouth at bedtime as needed for sleep 60 tablet 0     ibuprofen (ADVIL,MOTRIN) 200 MG tablet Take 200 mg by mouth every 6 (six) hours as needed for pain.       naproxen (NAPROSYN) 500 MG tablet Take 1 tablet (500 mg total) by mouth 2 (two) times a day with meals. 30 tablet 0     ondansetron (ZOFRAN ODT) 4 MG disintegrating tablet Take 1 tablet (4 mg total) by mouth every 8 (eight) hours as needed for nausea. 14 tablet 0     ondansetron (ZOFRAN, AS HYDROCHLORIDE,) 4 MG tablet Take 1 tablet (4 mg total) by mouth daily as needed for nausea. 30 tablet 1     No current facility-administered medications for this  visit.       Past Medical History:   Diagnosis Date     Allergic Rhinitis     Created by Conversion      Anxiety     Created by Conversion      Asthma      Cervical disc displacement      Depression      Endometriosis      External hemorrhoid      Groin cyst      Heart palpitations     wore holter monitor for 24 hours     Major Depression, Recurrent     Created by Conversion      Mild Persistent Asthma     Created by Conversion       Past Surgical History:   Procedure Laterality Date     CERVICAL FUSION N/A 11/24/2014    Procedure: Anterior Cervical Decompression Fusion Cervical 6 through Cervical 7 Bilateral;  Surgeon: Joselito Wolfe MD;  Location: [facility] OR;  Service:      CYST REMOVAL      [place]     HYSTERECTOMY       SD LIGATE FALLOPIAN TUBE      Description: Tubal Ligation;  Recorded: 11/21/2014;     SD TOTAL ABDOM HYSTERECTOMY      Description: Hysterectomy;  Recorded: 11/21/2014;     TUBAL LIGATION        Social History     Social History     Marital status: Single     Spouse name: N/A     Number of children: N/A     Years of education: N/A     Social History Main Topics     Smoking status: Former Smoker     Smokeless tobacco: Former User     Quit date: 9/2/2003      Comment: quit smoking 11 1/2 yrs ago     Alcohol use No     Drug use: No     Sexual activity: Not Asked     Other Topics Concern     None     Social History Narrative      History   Smoking Status     Former Smoker   Smokeless Tobacco     Former User     Quit date: 9/2/2003     Comment: quit smoking 11 1/2 yrs ago      Exam:   Blood pressure 110/62, pulse 70, temperature 98.2  F (36.8  C), temperature source Oral, resp. rate 14, weight 121 lb (54.9 kg), SpO2 98 %, not currently breastfeeding.   General: Pleasant 32 y/o female in [facility].  HEENT: Atraumatic, normocephalic, pupils equal, round, reactive to light. Conjunctivae pink. Sclerae anicteric. Tympanic membranes clear. Oropharynx erythematous. Nasal bogginess, no tonsillar  exudates appreciated on exam.  NECK: No lymphadenopathy or thyromegaly or JVD.  Respiratory: Lungs are clear to auscultation and percussion.  CVS: Regular rate and rhythm without murmur, rub, or gallop.  GI: Normal bowel sounds. Soft, nontender. No hepatosplenomegaly.  EXTREMITIES: No cyanosis, clubbing, or edema. 2+ peripheral pulses.  NEUROLOGIC: Motor and sensation grossly intact.         Recent Results (from the past 24 hour(s))   Rapid Strep A Screen-Throat   Result Value Ref Range    Rapid Strep A Antigen No Group A Strep detected, presumptive negative No Group A Strep detected, presumptive negative      Assessment/Plan   1. Viral URI with cough     2. Throat pain  Rapid Strep A Screen-Throat    Group A Strep, RNA Direct Detection, Throat      Patient Instructions   Discussed with the patient that his symptoms likely related to viral upper respiratory infection which should improve with symptomatic supportive cares.  Recommended supportive cares;   Advised plenty of fluids and rest. Tylenol prn for fever/discomfort.  If symptoms not improved in next 5-7 days, f/u with PCP, sooner if worsening.     Chris Erazo, DO  Internal Medicine

## 2021-06-14 NOTE — PROGRESS NOTES
Assessment/Plan:   Gastritis  Nausea & vomiting  Epigastric pain  Chronic intermittent sxs but worse for the last week.  Also associated with HA.  No fever. No URI or ST. Possibly aggravation from a viral gastritis (exposure).  No diarrhea.   - ranitidine (ZANTAC) 150 MG tablet; Take 1 tablet (150 mg total) by mouth 2 (two) times a day.  Dispense: 60 tablet; Refill: 0  - ondansetron (ZOFRAN) 4 MG tablet; Take 1 tablet (4 mg total) by mouth every 8 (eight) hours as needed for nausea.  Dispense: 30 tablet; Refill: 0  - HM1(CBC and Differential)  - Comprehensive Metabolic Panel  - Lipase  - HM1 (CBC with Diff)  Muscle tension headache  Persistent headache for a few days.  Neck muscles stiff and tight, HA feels like a vice around head.  Has relieved this type of headache with flexeril before.  Is status post cervical fusion and has had intermittent headache like this since. Pain aggravates nausea.   - cyclobenzaprine (FLEXERIL) 5 MG tablet; 5-10 mg every 8 hours as needed for headache and muscle spasm  Dispense: 30 tablet; Refill: 0    Fluids in small frequent amounts   [place] diet as tolerated in small amounts  Ranitidine twice a day  Flexeril as needed for muscle spasm  Avoid ibuprofen, use tylenol instead as needed  zofran as needed for nausea  Labs pending - will call or MyChart results  Follow up with primary care for ongoing management of chronic problems.     Subjective:      Vicki Pierson is a 32 y.o. female who presents with abdominal pain and headache.  Since 11/24 she has had epigastric pain off and on, somewhat cramping in nature, loss of appetite, nausea with occasional vomiting.  No fever. No diarrhea - in fact a little bit constipated.  Last BM yesterday.  She has also had 'migraine' for several days which is unusual for her and this is associated with nausea as well.  Zofran has helped with her nausea in the past. No photophobia.  The pain is all over the head, coming up from the neck where the  muscles are stiff and tight.  She has had more headaches like this since she had neck fusion.  Flexeril has helped with that in the past. Her kids have also recently had a vomiting illness, not lasting as long as hers and have also had coughs.  She had ST and cough on 11/17 but that had resolved before all this started.  She reports history of chronic abdominal pain issues - this kind of pain intermittently chronically but this has been worse and longer than usual and she is out of the zofran and flexeril.  No rash.      Allergies   Allergen Reactions     Venom-Honey Bee Anaphylaxis     Venlafaxine Anxiety and Other (See Comments)       Current Outpatient Prescriptions on File Prior to Visit   Medication Sig Dispense Refill     clindamycin (CLEOCIN) 300 MG capsule Take one tablet by mouth twice daily for 7 days. 14 capsule 0     guaiFENesin (ROBITUSSIN) 100 mg/5 mL syrup Take 10 mL (200 mg total) by mouth 3 (three) times a day as needed for cough.  0     hydrOXYzine (ATARAX) 25 MG tablet Take 1-2 tablets by mouth at bedtime as needed for sleep 60 tablet 0     ibuprofen (ADVIL,MOTRIN) 200 MG tablet Take 200 mg by mouth every 6 (six) hours as needed for pain.       naproxen (NAPROSYN) 500 MG tablet Take 1 tablet (500 mg total) by mouth 2 (two) times a day with meals. 30 tablet 0     ondansetron (ZOFRAN ODT) 4 MG disintegrating tablet Take 1 tablet (4 mg total) by mouth every 8 (eight) hours as needed for nausea. 14 tablet 0     No current facility-administered medications on file prior to visit.      Patient Active Problem List   Diagnosis     Allergic Rhinitis     Mild Persistent Asthma     Endometriosis     Major Depression, Recurrent     Anxiety     Tension-type Headache     Abnormal Blood Chemistry     Torticollis     Neck Pain     Cervical radiculopathy     Abnormal weight loss     S/P partial hysterectomy     Asthma with acute exacerbation         Objective:     /70  Pulse 79  Temp 98.2  F (36.8  C)  (Oral)   Resp 14  Wt 122 lb (55.3 kg)  SpO2 100%  Breastfeeding? No  BMI 20.94 kg/m2    Physical  General Appearance: Alert, cooperative, no distress  Head: Normocephalic, without obvious abnormality, atraumatic, tender to palpate at the occiput and up the scalp diffusely  Eyes: Conjunctivae are normal. Extraocular movements are intact. PERRLA  Ears: Normal TMs and external ear canals, both ears  Nose: No significant congestion.  Throat: Throat is normal.  No exudate.  No significant lesions  Neck: No adenopathy; tender muscles back of neck and traps  Lungs: Clear to auscultation bilaterally, respirations unlabored  Heart: Regular rate and rhythm  Abdomen: Soft, ND, mildy tender all over, mainly epigastric but no guarding, no masses, no organomegaly  Skin: no rashes or lesions  Psychiatric: Patient has a normal mood and affect.

## 2021-06-15 NOTE — PROGRESS NOTES
Preoperative Exam    Scheduled Procedure: Laproscopy - remove left ovary  Surgery Date:  2/13/18  Surgery Location: [facility], fax [phone]    Surgeon:  Dr. Floyd    Assessment/Plan:     1. Pre-op exam    2. Cyst of left ovary    3. Endometriosis    4. Mild intermittent asthma without complication  This is well controlled.  She denies any recent flares.    5. Neck Pain  She continues to take cyclobenzaprine as needed.      Surgical Procedure Risk: Intermediate (reported cardiac risk generally 1-5%)  Have you had prior anesthesia?: Yes  Have you or any family members had a previous anesthesia reaction:  No  Do you or any family members have a history of a clotting or bleeding disorder?: No  Cardiac Risk Assessment: no increased risk for major cardiac complications    Patient approved for surgery with general or local anesthesia.      Functional Status: Independent  Patient plans to recover at home with family.     Subjective:      Vicki Pierson is a 32 y.o. female who presents for a preoperative consultation.  She has a history of hysterectomy due to endometriosis and chronic pelvic pain.  Patient had an ultrasound on 12/1/17 showing a cystic lesion on the left ovary representing a calculus cyst associated with endometriosis.  Hemorrhagic cyst is also possible. She continues to experience a sharp, intermittent pain within her left lower pelvis.  She denies taking pain medication.     She has asthma and uses Ventolin as needed.  She denies any asthma flares.  She has chronic neck pain status post cervical fusion.  She takes cyclobenzaprine as needed.    All other systems reviewed and are negative, other than those listed in the HPI.    Pertinent History  Do you have difficulty breathing or chest pain after walking up a flight of stairs: No  History of obstructive sleep apnea: No  Steroid use in the last 6 months: No  Frequent Aspirin/NSAID use: No  Prior Blood Transfusion: No  Prior Blood  Transfusion Reaction: No  If for some reason prior to, during or after the procedure, if it is medically indicated, would you be willing to have a blood transfusion?:  There is no transfusion refusal.    Current Outpatient Prescriptions   Medication Sig Dispense Refill     albuterol (PROAIR HFA;PROVENTIL HFA;VENTOLIN HFA) 90 mcg/actuation inhaler Inhale 2 puffs.       cyclobenzaprine (FLEXERIL) 5 MG tablet 5-10 mg every 8 hours as needed for headache and muscle spasm 30 tablet 0   Patient is able to continue her medications.   No current facility-administered medications for this visit.         Allergies   Allergen Reactions     Tramadol Hives     Venom-Honey Bee Anaphylaxis     Venlafaxine Anxiety and Other (See Comments)       Patient Active Problem List   Diagnosis     Allergic Rhinitis     Asthma     Endometriosis     Major Depression, Recurrent     Anxiety     Tension-type Headache     Abnormal Blood Chemistry     Torticollis     Neck Pain     Cervical radiculopathy     Abnormal weight loss     S/P partial hysterectomy     Asthma with acute exacerbation       Past Medical History:   Diagnosis Date     Allergic Rhinitis     Created by Conversion      Anxiety     Created by Conversion      Asthma      Cervical disc displacement      Depression      Endometriosis      External hemorrhoid      Groin cyst      Heart palpitations     wore holter monitor for 24 hours     Major Depression, Recurrent     Created by Conversion      Mild Persistent Asthma     Created by Conversion        Past Surgical History:   Procedure Laterality Date     CERVICAL FUSION N/A 11/24/2014    Procedure: Anterior Cervical Decompression Fusion Cervical 6 through Cervical 7 Bilateral;  Surgeon: Joselito Wolfe MD;  Location: [facility] [place];  Service:      CYST REMOVAL      [place]     HYSTERECTOMY       NM LIGATE FALLOPIAN TUBE      Description: Tubal Ligation;  Recorded: 11/21/2014;     NM TOTAL ABDOM HYSTERECTOMY       "Description: Hysterectomy;  Recorded: 11/21/2014;     TUBAL LIGATION         Social History     Social History     Marital status: Single     Spouse name: N/A     Number of children: N/A     Years of education: N/A     Occupational History     Not on file.     Social History Main Topics     Smoking status: Former Smoker     Smokeless tobacco: Former User     Quit date: 9/2/2003      Comment: quit smoking 11 1/2 yrs ago     Alcohol use No     Drug use: No     Sexual activity: Not on file     Other Topics Concern     Not on file     Social History Narrative       Patient Care Team:  Sophie Gibson CNP as PCP - General      Objective:     Vitals:    02/06/18 0921   BP: 94/60   Pulse: 66   Weight: 124 lb 8 oz (56.5 kg)   Height: 5' 4\" (1.626 m)         Physical Exam:  BP 94/60  Pulse 66  Ht 5' 4\" (1.626 m)  Wt 124 lb 8 oz (56.5 kg)  BMI 21.37 kg/m2    General Appearance:    Alert, cooperative, no distress, appears stated age   Head:    Normocephalic, without obvious abnormality, atraumatic   Eyes:    PERRL, conjunctiva/corneas clear, EOM's intact, fundi     benign, both eyes   Ears:    Normal TM's and external ear canals, both ears   Nose:   Nares normal, septum midline, mucosa normal, no drainage     or sinus tenderness   Throat:   Lips, mucosa, and tongue normal; teeth and gums normal   Neck:   Supple, symmetrical, trachea midline, no adenopathy;     thyroid:  no enlargement/tenderness/nodules; no carotid    bruit or JVD   Back:     Symmetric, no curvature, ROM normal, no CVA tenderness   Lungs:     Clear to auscultation bilaterally, respirations unlabored   Chest Wall:    No tenderness or deformity    Heart:    Regular rate and rhythm, S1 and S2 normal, no murmur, rub    or gallop   Breast Exam:    Deferred    Abdomen:     Soft, non-tender, bowel sounds active all four quadrants,     no masses, no organomegaly   Genitalia:  Deferred    Rectal:  Deferred   Extremities:   Extremities normal, atraumatic, no cyanosis " or edema   Pulses:   2+ and symmetric all extremities   Skin:   Skin color, texture, turgor normal, no rashes or lesions   Lymph nodes:   Cervical, supraclavicular, and axillary nodes normal   Neurologic:   CNII-XII intact, normal strength, sensation and reflexes     throughout       Patient Instructions     Hold all supplements, aspirin and NSAIDs for 7 days prior to surgery.  Follow your surgeon's direction on when to stop eating and drinking prior to surgery.  Your surgeon will be managing your pain after your surgery.    Remove nail polish from fingers before surgery.      Labs:  Recent Results (from the past 24 hour(s))   Hemoglobin    Collection Time: 02/06/18  9:58 AM   Result Value Ref Range    Hemoglobin 14.8 12.0 - 16.0 g/dL       Immunization History   Administered Date(s) Administered     Influenza, seasonal,quad inj 36+ mos 09/25/2012     PPD Test 04/26/2016     Td,adult,historic,unspecified 03/01/2006     Tdap 04/12/2011           Electronically signed by Sophie Gibson CNP 02/06/18 9:23 AM

## 2021-06-15 NOTE — PROGRESS NOTES
Provider E-Visit time total (minutes): 3      Assessment:   The encounter diagnosis was Other migraine without status migrainosus, not intractable.     Plan:   No medications were ordered this encounter    Patient Instructions     Based on the information provided, I would recommend you come in for an appointment or schedule a virtual appointment to discuss these symptoms and for refills. Please schedule an appointment.    You will not be charged for this eVisit.    Return for further follow up if needed. Call [phone] or schedule an appointment via [email or web address]    Subjective:   Vicki Pierson is a 35 y.o. female who submitted an eVisit request for evaluation of her Other (Entered automatically based on patient selection in [email or web address].).  See the questionnaire and message section of encounter report for information related to history of present illness and review of systems.    The following portions of the patient's history were reviewed and updated as appropriate:  She does not have any pertinent problems on file.  She is allergic to tramadol; venom-honey bee; and venlafaxine..     Objective:   No exam performed today, patient submitted as eVisit.

## 2021-06-15 NOTE — PROGRESS NOTES
Assessment and Plan:     1. Neck pain  Discussed symptomatic treatment including rest, ice, stretching activities.  Will treat with Medrol and Cyclobenzaprine.  Educated on its indications and side effects.  Patient is to avoid other sedatives with Cycloenzaprine.  Patient may consider seeing another spine specialist.  - methylPREDNISolone (MEDROL DOSEPACK) 4 mg tablet; follow package directions  Dispense: 21 tablet; Refill: 0  - cyclobenzaprine (FLEXERIL) 5 MG tablet; Take 1-2 tablets (5-10 mg total) by mouth 3 (three) times a day as needed.  Dispense: 30 tablet; Refill: 0    2. Bacterial vaginosis  Patient declines vaginal examination today.  She has consistent history of bacterial vaginosis.  Will treat with clindamycin.  Educated on its indications and side effects.  Discussed risk of C. difficile.  - clindamycin (CLEOCIN) 300 MG capsule; Take one tablet by mouth twice daily for 7 days.  Dispense: 14 capsule; Refill: 0    3. Medial epicondylitis of right elbow  Discussed symptomatic treatment including rest and ice.  Recommend over-the-counter elbow sleeve.  Will refer to orthopedics for cortisone injection.  She is content with the plan.  - Ambulatory referral to Orthopedics    Subjective:     Vicki is a 32 y.o. female presenting to the clinic for multiple concerns today.  Patient has been experiencing neck pain for 1 month.  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  Patient developed neck stiffness and trouble turning her neck.  Patient did quit that job last Thursday.  She felt overworked.  Patient has a history of a cervical fusion at C6-C7.  She has been taking cyclobenzaprine and ibuprofen.  Patient is also concerned of right elbow pain for 2-3 weeks.  She denies any injury.  States she was performing repetitive motions at the ENT job including scanning.  She complains of a constant throb within the medial aspect of the elbow.  Movement causes a shooting sensation.  Patient is also concerned of a  vaginal bacterial infection.  She is prone to bacterial vaginosis.  She did take to leftover clindamycin.  She has had symptoms for 2-3 days including vaginal discharge and odor.  She has no concerns for STDs    Review of Systems: A complete 14 point review of systems was obtained and is negative or as stated in the history of present illness.    Social History     Social History     Marital status: Single     Spouse name: N/A     Number of children: N/A     Years of education: N/A     Occupational History     Not on file.     Social History Main Topics     Smoking status: Former Smoker     Smokeless tobacco: Former User     Quit date: 9/2/2003      Comment: quit smoking 11 1/2 yrs ago     Alcohol use No     Drug use: No     Sexual activity: Not on file     Other Topics Concern     Not on file     Social History Narrative       Active Ambulatory Problems     Diagnosis Date Noted     Allergic Rhinitis      Mild Persistent Asthma      Endometriosis      Major Depression, Recurrent      Anxiety      Tension-type Headache      Abnormal Blood Chemistry      Torticollis      Neck Pain      Cervical radiculopathy 11/24/2014     Abnormal weight loss 01/22/2015     S/P partial hysterectomy 03/09/2015     Asthma with acute exacerbation 05/15/2010     Resolved Ambulatory Problems     Diagnosis Date Noted     External Hemorrhoids      Lump In / On The Skin      Upper Respiratory Infection      Palpitations      Atypical Chest Pain      Hematemesis 03/09/2015     Past Medical History:   Diagnosis Date     Allergic Rhinitis      Anxiety      Asthma      Cervical disc displacement      Depression      Endometriosis      External hemorrhoid      Groin cyst      Heart palpitations      Major Depression, Recurrent      Mild Persistent Asthma        Family History   Problem Relation Age of Onset     No Medical Problems Mother      Lung cancer Father      Stroke Father      Cancer Maternal Grandmother      Cancer Maternal Grandfather   "    Diabetes Paternal Grandmother        Objective:     BP 96/58  Pulse 64  Ht 5' 4\" (1.626 m)  Wt 124 lb 6.4 oz (56.4 kg)  BMI 21.35 kg/m2    Patient is alert, in no obvious distress.   Skin: Warm, dry.    Lungs:  Clear to auscultation. Respirations even and unlabored.  No wheezing or rales noted.   Heart:  Regular rate and rhythm.  No murmurs.   Abdomen: Soft, nontender.  No organomegaly. Bowel sounds normoactive. No guarding or masses noted.   : deferred per patient   Musculoskeletal:  She has full ROM of her neck.  She is tender to palpation of her right sternocleidomastoid.  She has full range of motion of right elbow and is tender to palpation of the medial epicondyle.  Biceps, triceps, brachial radialis DTRs symmetrical.  Sensations intact              "

## 2021-06-15 NOTE — TELEPHONE ENCOUNTER
Who is calling:  Patient   Reason for Call:  Patient called back message was relayed.   Date of last appointment with primary care: NA  Okay to leave a detailed message: No

## 2021-06-15 NOTE — TELEPHONE ENCOUNTER
----- Message from Sophie Gibson CNP sent at 2/11/2021  2:50 PM CST -----  Please notify the patient that the humerus MRI showed no evidence of injury or concerning lesions.  Thanks.

## 2021-06-15 NOTE — PROGRESS NOTES
Assessment and Plan:     1. Humerus lesion, right  MR Humerus Without Contrast Right   2. Acute pain of right shoulder  cyclobenzaprine (FLEXERIL) 5 MG tablet     Will obtain MRI of the humerus to rule out malignancy.  Discussed symptomatic treatment of pain within the right elbow and shoulder.  She continues to complete physical therapy for the right shoulder and is seeing orthopedics.  She continues cyclobenzaprine as needed.  She has also been prescribed gabapentin which she has had difficulty tolerating.  She is content with the plan.    Subjective:     Vicki is a 35 y.o. female presenting to the clinic for concerns for right shoulder and elbow pain.  Patient had right shoulder arthroscopy performed on 12/21/2020 for repair of a torn labrum and biceps tendon repair.  Patient had an accidental fall on 2/6/2021 where she tripped and fell down 3 steps.  She was able to catch herself but her right elbow was forced upwards.  Patient presented to [facility] emergency care center where an x-ray showed a lesion within the right proximal humeral shaft.  It was nonspecific/indeterminant.  Radiologist could not exclude malignancy.  Patient has had difficulty recovering from her shoulder arthroscopy.  She has limited range of motion and has been seeing physical therapy.  She is now experiencing right elbow pain which she describes as a constant ache.  She has been taking cyclobenzaprine as needed for pain.    Review of Systems: A complete 14 point review of systems was obtained and is negative or as stated in the history of present illness.    Social History     Socioeconomic History     Marital status: Single     Spouse name: Not on file     Number of children: Not on file     Years of education: Not on file     Highest education level: Not on file   Occupational History     Not on file   Social Needs     Financial resource strain: Not on file     Food insecurity     Worry: Not on file     Inability: Not on file      Transportation needs     Medical: Not on file     Non-medical: Not on file   Tobacco Use     Smoking status: Former Smoker     Smokeless tobacco: Former User     Quit date: 9/2/2003     Tobacco comment: quit smoking 14 yrs ago   Substance and Sexual Activity     Alcohol use: Yes     Alcohol/week: 1.0 standard drinks     Types: 1 Shots of liquor per week     Comment: rare     Drug use: Yes     Types: Marijuana     Comment: recreational     Sexual activity: Not on file   Lifestyle     Physical activity     Days per week: Not on file     Minutes per session: Not on file     Stress: Not on file   Relationships     Social connections     Talks on phone: Not on file     Gets together: Not on file     Attends Rastafarian service: Not on file     Active member of club or organization: Not on file     Attends meetings of clubs or organizations: Not on file     Relationship status: Not on file     Intimate partner violence     Fear of current or ex partner: Not on file     Emotionally abused: Not on file     Physically abused: Not on file     Forced sexual activity: Not on file   Other Topics Concern     Not on file   Social History Narrative     Not on file       Active Ambulatory Problems     Diagnosis Date Noted     Allergic Rhinitis      Asthma      Endometriosis      Major Depression, Recurrent      Anxiety      Tension-type Headache      Abnormal Blood Chemistry      Torticollis      Neck Pain      Palpitations      Precordial pain      Cervical radiculopathy 11/24/2014     Abnormal weight loss 01/22/2015     S/P partial hysterectomy 03/09/2015     Pelvic pain 02/13/2018     Left ovarian cyst 02/13/2018     Resolved Ambulatory Problems     Diagnosis Date Noted     External Hemorrhoids      Lump In / On The Skin      Upper Respiratory Infection      Hematemesis 03/09/2015     Asthma with acute exacerbation 05/15/2010     Past Medical History:   Diagnosis Date     Allergic Rhinitis      Anxiety      Arthritis       Cervical disc displacement      Depression      Endometriosis      External hemorrhoid      Groin cyst      Heart palpitations      History of anesthesia complications      Major Depression, Recurrent      Mild Persistent Asthma      PONV (postoperative nausea and vomiting)        Family History   Problem Relation Age of Onset     No Medical Problems Mother      Lung cancer Father      Stroke Father      Cancer Maternal Grandmother      Cancer Maternal Grandfather      Diabetes Paternal Grandmother        Objective:     BP 90/60 (Patient Site: Left Arm, Patient Position: Sitting, Cuff Size: Adult Regular)   Pulse 88   Wt 119 lb 6.4 oz (54.2 kg)   BMI 20.49 kg/m      Patient is alert, in no obvious distress.   Skin: Warm, dry.   Lungs:  Clear to auscultation. Respirations even and unlabored.  No wheezing or rales noted.   Heart:  Regular rate and rhythm.  No murmurs.    Musculoskeletal: She is able to abduct 20 to 30 degrees of the right shoulder.  She has full range of motion of the elbow.  She is tender to palpation of her both her medial and lateral epicondyles.

## 2021-06-16 NOTE — ANESTHESIA POSTPROCEDURE EVALUATION
Patient: Vicki Pierson  LAPAROSCOPY LEFT  OOPHORECTOMY  Anesthesia type: No value filed.    Patient location: PACU  Last vitals:   Vitals:    02/13/18 1230   BP: 101/67   Pulse: 79   Resp: 16   Temp:    SpO2: 99%     Post vital signs: stable  Level of consciousness: awake and responds to simple questions  Post-anesthesia pain: pain controlled  Post-anesthesia nausea and vomiting: no  Pulmonary: unassisted, return to baseline  Cardiovascular: stable and blood pressure at baseline  Hydration: adequate  Anesthetic events: no    QCDR Measures:  ASA# 11 - Amparo-op Cardiac Arrest: ASA11B - Patient did NOT experience unanticipated cardiac arrest  ASA# 12 - Amparo-op Mortality Rate: ASA12B - Patient did NOT die  ASA# 13 - PACU Re-Intubation Rate: ASA13B - Patient did NOT require a new airway mgmt  ASA# 10 - Composite Anes Safety: ASA10A - No serious adverse event    Additional Notes:

## 2021-06-16 NOTE — ANESTHESIA CARE TRANSFER NOTE
Last vitals:   Vitals:    02/13/18 1130   BP: 107/65   Pulse: 87   Resp: 16   Temp:    SpO2: 99%     Patient's level of consciousness is drowsy  Spontaneous respirations: yes  Maintains airway independently: yes  Dentition unchanged: yes  Oropharynx: oropharynx clear of all foreign objects    QCDR Measures:  ASA# 20 - Surgical Safety Checklist: WHO surgical safety checklist completed prior to induction  PQRS# 430 - Adult PONV Prevention: 4558F - Pt received => 2 anti-emetic agents (different classes) preop & intraop  ASA# 8 - Peds PONV Prevention: NA - Not pediatric patient, not GA or 2 or more risk factors NOT present  PQRS# 424 - Amparo-op Temp Management: NA - MAC anesthesia or case < 60 minutes  PQRS# 426 - PACU Transfer Protocol: - Transfer of care checklist used  ASA# 14 - Acute Post-op Pain: ASA14B - Patient did NOT experience pain >= 7 out of 10

## 2021-06-16 NOTE — ANESTHESIA PREPROCEDURE EVALUATION
Anesthesia Evaluation      Patient summary reviewed     Airway   Mallampati: II  Neck ROM: full   Pulmonary - normal exam    breath sounds clear to auscultation                         Cardiovascular   Rhythm: regular  Rate: normal,         Neuro/Psych      Endo/Other - negative ROS      GI/Hepatic/Renal - negative ROS           Dental - normal exam                        Anesthesia Plan  Planned anesthetic: general endotracheal    ASA 2   Induction: intravenous   Anesthetic plan and risks discussed with: patient    Post-op plan: routine recovery

## 2021-06-17 NOTE — PROGRESS NOTES
Assessment and Plan:     1. Palpitations  Electrocardiogram Perform - Clinic    Ambulatory referral to Cardiology    Basic Metabolic Panel   2. Anxiety     3. Nausea  Ambulatory referral to Gastroenterology    ondansetron (ZOFRAN) 4 MG tablet     Palpitations may be caused by electrolyte imbalance, thyroid disease, anxiety, stress, caffeine use  TSH was 1.16 on 4/9/18.  Will check BMP.  Discussed avoidance of caffeine.  Will refer to cardiology for further evaluation and possible 30 day event monitor.  Patient has been experiencing nausea since her hysterectomy.  She has been taking Zofran as needed.  Will refer to gastroenterology for further evaluation.  She is content with the plan. I spent 40 minutes with the patient with greater than 50% spent discussing symptoms, treatment options, and coordination of care.       Subjective:     Vicki is a 32 y.o. female presenting to the clinic for concerns for palpitations.  Patient states she has had intermittent palpitations since 2014.  She had Holter monitor done showing no concerning findings.  It has worsened over the past month.  Patient states she was sitting at home yesterday when she felt her heart beat quickly 6 times in a row.  Patient experienced dizziness and lay down.  Patient states when this occurs she feels as though her heart is beating hard in her chest and head.  She denies chest pain, shortness of breath with exertion, edema, orthopnea.  She has been trying to eat and drink well.  She has been experiencing nausea since her hysterectomy.  She has lost 6 pounds.  She complains of lack of appetite.  She denies any increase in stress.  She has a history of consuming energy drinks once daily but stopped 2 months ago.  She is now drinking tea, ginger ale, and water.    Review of Systems: A complete 14 point review of systems was obtained and is negative or as stated in the history of present illness.    Social History     Social History     Marital status:  "Single     Spouse name: N/A     Number of children: N/A     Years of education: N/A     Occupational History     Not on file.     Social History Main Topics     Smoking status: Former Smoker     Smokeless tobacco: Former User     Quit date: 9/2/2003      Comment: quit smoking 14 yrs ago     Alcohol use Yes      Comment: rare     Drug use: No     Sexual activity: Not on file     Other Topics Concern     Not on file     Social History Narrative       Active Ambulatory Problems     Diagnosis Date Noted     Allergic Rhinitis      Asthma      Endometriosis      Major Depression, Recurrent      Anxiety      Tension-type Headache      Abnormal Blood Chemistry      Torticollis      Neck Pain      Cervical radiculopathy 11/24/2014     Abnormal weight loss 01/22/2015     S/P partial hysterectomy 03/09/2015     Asthma with acute exacerbation 05/15/2010     Pelvic pain 02/13/2018     Left ovarian cyst 02/13/2018     Resolved Ambulatory Problems     Diagnosis Date Noted     External Hemorrhoids      Lump In / On The Skin      Upper Respiratory Infection      Palpitations      Atypical Chest Pain      Hematemesis 03/09/2015     Past Medical History:   Diagnosis Date     Allergic Rhinitis      Anxiety      Arthritis      Asthma      Cervical disc displacement      Depression      Endometriosis      External hemorrhoid      Groin cyst      Heart palpitations      History of anesthesia complications      Major Depression, Recurrent      Mild Persistent Asthma      PONV (postoperative nausea and vomiting)      Sleep apnea        Family History   Problem Relation Age of Onset     No Medical Problems Mother      Lung cancer Father      Stroke Father      Cancer Maternal Grandmother      Cancer Maternal Grandfather      Diabetes Paternal Grandmother        Objective:     /62  Pulse 64  Ht 5' 4\" (1.626 m)  Wt 117 lb 9.6 oz (53.3 kg)  SpO2 98%  BMI 20.19 kg/m2    Patient is alert, in no obvious distress.   Skin: Warm, dry.  No " lesions or rashes.  Skin turgor rapid return.   HEENT:  Head normocephalic, atraumatic.  Eyes normal. Ears normal.  Nose patent, mucosa pink.  Oropharynx mucosa pink.  No lesions or tonsillar enlargement.   Neck: Supple, no lymphadenopathy.   Lungs:  Clear to auscultation. Respirations even and unlabored.  No wheezing or rales noted.   Heart:  Regular rate and rhythm.  No murmurs, S3, S4, gallops, or rubs.    Abdomen: Soft, nontender.  No organomegaly. Bowel sounds normoactive. No guarding or masses noted.     LABORATORY: I ordered and personally reviewed an EKG showing normal sinus rhythm.

## 2021-06-17 NOTE — TELEPHONE ENCOUNTER
"Patient states she is the middle of her bridal shower and her right middle finger keeps going numb and tingles and then starts to turn purple.  Patient is able to flex/move finger and then it resolve.  Patient will go to  after bridal shower.    Indu Carson RN  [facility] Triage Nurse Advisor    Reason for Disposition    Hand pain is main symptom (and having mild weakness, numbness, or tingling in hand related to hand pain)    Numbness (i.e., loss of sensation) in hand or fingers (Exception: just tingling; numbness present > 2 weeks)    Additional Information    Negative: [1] SEVERE weakness (i.e., unable to walk or barely able to walk, requires support) AND [2] new onset or worsening    Negative: [1] Weakness (i.e., paralysis, loss of muscle strength) of the face, arm / hand, or leg / foot on one side of the body AND [2] sudden onset AND [3] present now    Negative: [1] Numbness (i.e., loss of sensation) of the face, arm / hand, or leg / foot on one side of the body AND [2] sudden onset AND [3] present now    Negative: [1] Loss of speech or garbled speech AND [2] sudden onset AND [3] present now    Negative: Difficult to awaken or acting confused (e.g., disoriented, slurred speech)    Negative: Sounds like a life-threatening emergency to the triager    Negative: [1] Similar pain previously AND [2] it was from \"heart attack\"    Negative: [1] Similar pain previously AND [2] it was from \"angina\" AND [3] not relieved by nitroglycerin    Negative: Sounds like a life-threatening emergency to the triager    Negative: Followed a hand or wrist injury    Negative: Chest pain    Negative: Caused by an animal bite    Negative: Caused by a human bite    Negative: Wound looks infected    Negative: Finger pain is main symptom    Negative: Arm pain is main symptom    Negative: [1] Swollen joint AND [2] fever    Negative: [1] Red area or streak AND [2] fever    Negative: Patient sounds very sick or weak to the " triager    Negative: [1] SEVERE pain (e.g., excruciating, unable to use hand at all) AND [2] not improved after 2 hours of pain medicine    Negative: [1] Looks infected (spreading redness, pus) AND [2] large red area (> 2 in. or 5 cm)    Negative: Weakness (i.e., loss of strength) of new onset in hand or fingers  (Exceptions: not truly weak, hand feels weak because of pain; weakness present > 2 weeks)    Protocols used: NEUROLOGIC DEFICIT-A-AH, HAND AND WRIST PAIN-A-AH

## 2021-06-17 NOTE — PATIENT INSTRUCTIONS - HE
Patient Instructions by Cheli Marina MD at 7/11/2019  1:00 PM     Author: Cheli Marina MD Service: -- Author Type: Physician    Filed: 7/11/2019  1:28 PM Encounter Date: 7/11/2019 Status: Addendum    : Cheli Marina MD (Physician)    Related Notes: Original Note by Cheli Marina MD (Physician) filed at 7/11/2019  1:28 PM       1. Keep well hydrated  2. May alternate Tylenol every 6 hours with ibuprofen every 6 hours as needed for fever or pain  3. Reschedule your follow up appointment for next week   4. If you have any questions, call the clinic number  - it's answered 24/7    Patient Education     Epigastric Pain (Uncertain Cause)     Epigastric pain can be a sign of disease in the upper abdomen. Common causes include:    Acid reflux (stomach acid flowing up into the esophagus)    Gastritis (irritation of the stomach lining)    Peptic Ulcer Disease    Inflammation of the pancreas    Gallstone    Infection in the gallbladder  Pain may be dull or burning. It may spread upward to the chest or to the back. There may be other symptoms such as belching, bloating, cramps or hunger pains. There may be weight loss or poor appetite, nausea or vomiting.  Since the diagnosis of your pain is not certain yet, further tests may sometimes be needed. Sometimes the doctor will treat you for the most likely condition to see if there is improvement before doing further tests.  Home care  Medicines    Antacids help neutralize the normal acids in your stomach. Examples are Maalox, Mylanta, Rolaids, and Tums. If you dont like the liquid, you can also try a chewable one. You may find one works better than another for you. Overuse can cause diarrhea or constipation.    Acid blockers (H2 blockers) decrease acid production. Examples are cimetidine (Tagamet), famotidine (Pepcid) and ranitidine (Zantac).    Acid inhibitors (PPIs) decrease acid production in a different way than the blockers. You may find they work  better, but can take a little longer to take effect.  Examples are omeprazole (Prilosec), lansoprazole (Prevacid), pantoprazole (Protonix), rabeprazole (Aciphex), and esomeprazole (Nexium).    Take an antacid 30-60 minutes after eating and at bedtime, but not at the same time as an acid blocker.    Try not to take NSAIDs. Aspirin may also cause problems, but if taking it for your heart or other medical reasons, talk to your doctor before stopping it; you do not want to cause a worse problem, like a heart attack or stroke.  Diet    If certain foods seem to cause your spasm, try to avoid them.     Eat slowly and chew food well before swallowing. Symptoms of gastritis can be worsened by certain foods. Limit or avoid fatty, fried, and spicy foods, as well as coffee, chocolate, mint, and foods with high acid content such as tomatoes and citrus fruit and juices (orange, grapefruit, lemon).    Avoid alcohol, caffeine, and tobacco, which can delay healing and worsen your problem.    Try eating smaller meals with snacks in between  Follow-up care  Follow up with your healthcare provider or as advised.  When to seek medical advice  Call your healthcare provider right away if any of the following occur:    Stomach pain worsens or moves to the right lower part of the abdomen    Chest pain appears, or if it worsens or spreads to the chest, back, neck, shoulder, or arm    Frequent vomiting (cant keep down liquids)    Blood in the stool or vomit (red or black color)    Feeling weak or dizzy, fainting, or having trouble breathing    Fever of 100.4 F (38 C) or higher, or as directed by your healthcare provider    Abdominal swelling  Date Last Reviewed: 9/25/2015 2000-2017 [employer]. [address] [place] [address], [place] [address]. All rights reserved. This information is not intended as a substitute for professional medical care. Always follow your healthcare professional's instructions.

## 2021-06-17 NOTE — PROGRESS NOTES
Assessment and Plan:     1. Nausea  ondansetron (ZOFRAN, AS HYDROCHLORIDE,) 4 MG tablet   2. Viral gastroenteritis     3. Hot flashes  Luteinizing Hormone (LH)    Follicle Stimulating Hormone (FSH)    Thyroid Cascade    Testosterone, Total and Free   4. Hemorrhoids, unspecified hemorrhoid type  Ambulatory referral to Colorectal Surgery    hydrocortisone (ANUSOL-HC) 2.5 % rectal cream     I suspect patient had viral gastroenteritis which caused vomiting and diarrhea.  Discussed symptomatic treatment and the importance of good hydration.  Provided a prescription for Zofran to be used as needed.  Patient has been taking an herbal supplement for the hot flashes.  This is providing assistance.  I explained to her that I am unsure of the ingredients and the safety of this medication.  Will check labs to determine if she is in menopause.  Discussed symptomatic treatment of hemorrhoids.  Provided a prescription for Proctosol cream to be used sparingly.  Will refer to colorectal surgery for further evaluation due to her significant discomfort.  Discussed increasing fluid and fiber intake to prevent constipation.  She is to keep bowel movements soft.  She is content with the plan.    Subjective:     Vicki is a 32 y.o. female presenting to the clinic for multiple concerns today.  Patient states last night she developed vomiting and diarrhea.  This did stop this morning, but she continues to experience nausea.  She is staying well-hydrated.  She denies any abdominal pain or discomfort.  She has not had any fever.  She does request a prescription for Zofran.  Patient also presents for concerns of hot flashes.  Patient has a history of an abdominal hysterectomy with a right oophorectomy.  Patient would like to know if she is in menopause.  She has been experiencing hot flashes multiple times daily.  She saw her acupuncturist on Friday who  provided [employer].  Since she started this on Friday, she has not had a  hot flash.  She is questioning if it is safe to continue.  She denies any personal or family history of blood clots. Lastly, patient has been diagnosed with rectal hemorrhoids in the past.  She has had them for multiple years and states it is painful to have bowel movement.  She notices blood on the toilet paper every time she wipes.  She does have a bowel movement daily but occasionally takes Dulcolax to prevent constipation.  She has been eating healthy and has increased her fluid intake.    Review of Systems: A complete 14 point review of systems was obtained and is negative or as stated in the history of present illness.    Social History     Social History     Marital status: Single     Spouse name: N/A     Number of children: N/A     Years of education: N/A     Occupational History     Not on file.     Social History Main Topics     Smoking status: Former Smoker     Smokeless tobacco: Former User     Quit date: 9/2/2003      Comment: quit smoking 14 yrs ago     Alcohol use Yes      Comment: rare     Drug use: No     Sexual activity: Not on file     Other Topics Concern     Not on file     Social History Narrative       Active Ambulatory Problems     Diagnosis Date Noted     Allergic Rhinitis      Asthma      Endometriosis      Major Depression, Recurrent      Anxiety      Tension-type Headache      Abnormal Blood Chemistry      Torticollis      Neck Pain      Cervical radiculopathy 11/24/2014     Abnormal weight loss 01/22/2015     S/P partial hysterectomy 03/09/2015     Asthma with acute exacerbation 05/15/2010     Pelvic pain 02/13/2018     Left ovarian cyst 02/13/2018     Resolved Ambulatory Problems     Diagnosis Date Noted     External Hemorrhoids      Lump In / On The Skin      Upper Respiratory Infection      Palpitations      Atypical Chest Pain      Hematemesis 03/09/2015     Past Medical History:   Diagnosis Date     Allergic Rhinitis      Anxiety      Arthritis      Asthma      Cervical disc  "displacement      Depression      Endometriosis      External hemorrhoid      Groin cyst      Heart palpitations      History of anesthesia complications      Major Depression, Recurrent      Mild Persistent Asthma      PONV (postoperative nausea and vomiting)      Sleep apnea        Family History   Problem Relation Age of Onset     No Medical Problems Mother      Lung cancer Father      Stroke Father      Cancer Maternal Grandmother      Cancer Maternal Grandfather      Diabetes Paternal Grandmother        Objective:     BP 90/58  Pulse 70  Temp 98  F (36.7  C)  Ht 5' 4\" (1.626 m)  Wt 118 lb (53.5 kg)  BMI 20.25 kg/m2    Patient is alert, in no obvious distress.   Skin: Warm, dry.    Lungs:  Clear to auscultation. Respirations even and unlabored.  No wheezing or rales noted.   Heart:  Regular rate and rhythm.  No murmurs, S3, S4, gallops, or rubs.    Abdomen: Soft, nontender.  No organomegaly. Bowel sounds normoactive. No guarding or masses noted.   Rectal:  Patient has a small area of excess skin noted which could be a nonthrombosed rectal hemorrhoid versus a large skin tag.                "

## 2021-06-17 NOTE — PATIENT INSTRUCTIONS - HE
Patient Instructions by Wilson Garrido MD at 10/24/2019 11:30 AM     Author: Wilson Garrido MD Service: -- Author Type: Physician    Filed: 10/24/2019 11:56 AM Encounter Date: 10/24/2019 Status: Signed    : Wilson Garrido MD (Physician)       Patient Education     Asthma (Adult)  Asthma is a disease where the medium and  small air passages within the lung go into spasm and restrict the flow of air. Inflammation and swelling of the airways cause further blockage. During an acute asthma attack, these factors cause trouble breathing, wheezing, cough and chest tightness.    An asthma attack can be triggered by many things. Common triggers include infections such as the common cold, bronchitis, and pneumonia. Irritants such as smoke or pollutants in the air, very cold air, emotional upset, and exercise can also trigger an attack. In many adults with asthma, allergies to dust, mold, pollen and animal dander can cause an asthma attack. Skipping doses of daily asthma medicine can also bring on an asthma attack.  Asthma can be controlled using the proper medicines prescribed by your healthcare provider and avoiding exposure to known triggers including allergens and irritants.  Home care    Take prescribed medicine exactly at the times advised. If you need medicine such as from a hand held inhaler or aerosol breathing machine more than every 4 hours, contact your healthcare provider or seek immediate medical attention. If prescribed an antibiotic or prednisone, take all of the medicine as prescribed, even if you are feeling better after a few days.    Don't smoke. Avoid being exposed to the smoke of others.    Some people with asthma have worsening of their symptoms when they take aspirin and non-steroidal or fever-reducing medicines like ibuprofen and naproxen. Talk to your healthcare provider if you think this may apply to you.  Follow-up care  Follow up with your healthcare  "provider, or as advised. Always bring all of your current medicines to any appointments with your healthcare provider. Also bring a complete list of medicines even those not taken for asthma. If you don't already have one, talk to your healthcare provider about developing your own \"Asthma Action Plan.\"  A pneumococcal (pneumonia) vaccine and yearly flu shot (every fall) are recommended. Ask your doctor about this.  When to seek medical advice  Call your healthcare provider right away if any of these occur:     Increased wheezing or shortness of breath    Need to use your inhalers more often than usual without relief    Fever of 100.4 F (38 C) or higher, or as directed by your healthcare provider    Coughing up lots of dark-colored or bloody sputum (mucus)    Chest pain with each breath    If you use a peak flow meter as part of an Asthma Action Plan, and you are still in the yellow zone (50% to 80%) 15 minutes after using inhaler medicine.  Call 911  Call 911 if any of the following occur    Trouble walking or talking because of shortness of breath    If you use a peak flow meter as part of an Asthma Action Plan and you are still in the red zone (less than 50%) 15 minutes after using inhaler medicine    Lips or fingernails turning gray or blue  Date Last Reviewed: 5/1/2017 2000-2017 [employer]. [address] [place] [address], [place] [address]. All rights reserved. This information is not intended as a substitute for professional medical care. Always follow your healthcare professional's instructions.                "

## 2021-06-18 NOTE — PATIENT INSTRUCTIONS - HE
Patient Instructions by Letty Esquivel MD at 2/21/2020  3:10 PM     Author: Letty Esquivel MD Service: -- Author Type: Physician    Filed: 2/21/2020  3:01 PM Encounter Date: 2/21/2020 Status: Signed    : Letty Esquivel MD (Physician)       40 mg omeprazole in the morning  zofran as needed  Consider a slight decrease    Fiber!     Too long, didn't read summary:  - all fiber is not the same  - psyllium (metamucil)  is the best to lower blood sugar and cholesterol, it also regulates bowel movements  - pretty much everyone should take it      If you want the science and more detail, keep reading:    There is general agreement with the statement that fiber is good for you. There is also general agreement that most people do not consume enough fiber in their diet and therefore would benefit from eating more fiber. Both statements are true when applied to fiber-rich whole foods (eg, fruits, vegetables, legumes, whole grains). The statements become less accurate, and less broadly applicable, when applied to fiber supplements.     If patients are compliant with their clinicians advice to change their eating habits, and a substantial portion of a given diet is replaced by high-fiber foods, then both the total calories consumed and the glycemic index of the diet should be significantly reduced.     However, many patients struggle to alter diet significantly. So how should one choose a fiber supplement?    There are 4 kinds:    1. Insoluble fiber, like wheat bran:   a. it has no significant laxative effect  b. insoluble fiber does not create a gel or alter viscosity and thus does not provide other (gel-dependent) fiber health benefits.  2. Soluble, nonviscous, readily fermented (eg, inulin, wheat dextrin, oligosaccharides, resistant starches):   a. dissolves in water; no increase in viscosity  b. rapidly and completely fermented (once fermented, the fiber is no longer present in stool, so you get  rapid gas formation, increased flatulence, calorie uptake  c. may alter the numbers of specific bacteria in the gut (eg, prebiotic effect)  d. no laxative effect at physiologic doses  e. does not gel or alter viscosity and thus does not provide any of the gel-dependent fiber health benefits. Readily fermented fibers are part of an emerging area of science relating to their effects on the gut microbiome, but to date, the marketed fiber supplements have no established clinically meaningful health benefits.  3. Soluble viscous/gel forming, readily fermented (eg, ?-glucan [oats, barley], raw guar gum):   a. dissolves in water, forms a viscous gel (eg, oatmeal),   b. improves glycemic control, improves blood sugars  c. lowers elevated serum cholesterol  d. readily fermented (causes flatulence and calorie uptake)   e. no significant laxative effect and no retained gel to attenuate diarrhea.  4. Soluble viscous/gel forming, nonfermented (ie, psyllium):   a. dissolves in water, forms a viscous gel  b. improves glycemic control (improves blood sugars)  c. lowers elevated serum cholesterol  d. not fermented (no gas production, no calorie harvest from fermentation by-products); because it is not fermented, it remains gelled throughout the large bowel, providing a  stool-normalizing effect: softens hard stool in constipation (relieves/prevents constipation) and firms/forms loose/liquid stool in diarrhea (relieves/prevents diarrhea), and normalizes stool form in IBS.    Of all the choices, Psyllium husk (or Metamucil) is the best for overall health benefits.     Psyllium can help with appetite control and improve blood sugar:  1. Gelling fiber can delay the absorption of nutrients long enough to deliver nutrients to the distal ileum (end of the small intestine), where they are not normally present.   2. Nutrients delivered to the distal ileum stimulate release of glucagon-like peptide-1 into the blood stream.   3. Glucagon-like  peptide-1 significantly decreases appetite, increases insulin secretion, decreases glucagon secretion (a peptide that stimulates glucose production in the liver), increases pancreatic ?-cell growth (cells that produce insulin), improves insulin production and sensitivity, and slows gastric emptying and small bowel transit via a feedback loop called the ileal brake phenomenon.    Psyllium can help lower cholesterol:  1. Lowering elevated serum cholesterol concentrations is also a gel-dependent phenomenon in the small bowel, and the effectiveness of cholesterol lowering is highly correlated with the viscosity of the gel-forming fiber:   2. The higher the viscosity of a gel-forming fiber is, the greater the effect on lowering elevated cholesterol concentrations.   3. When bile is trapped in a gel-forming fiber and eliminated via stool, the liver must produce more bile to meet digestive needs. Cholesterol is a component of bile, and the liver uses serum stores of cholesterol to generate more bile, effectively lowering serum LDL-cholesterol and total cholesterol concentration, without affecting good cholesterol.         https://www.ncbi.nlm.nih.gov/pmc/articles/VEG7446933/

## 2021-06-18 NOTE — PATIENT INSTRUCTIONS - HE
Patient Instructions by Twan Wen CNP at 10/28/2020 11:50 AM     Author: Twan Wen CNP Service: -- Author Type: Nurse Practitioner    Filed: 10/28/2020 12:18 PM Encounter Date: 10/28/2020 Status: Signed    : Twan Wen CNP (Nurse Practitioner)         Patient Education     Bacterial Vaginosis    You have a vaginal infection called bacterial vaginosis (BV). Both good and bad bacteria are present in a healthy vagina. BV occurs when these bacteria get out of balance. The number of bad bacteria increase. And the number of good bacteria decrease. Although BV is associated with sexual activity, it is not a sexually transmitted disease.  BV may or may not cause symptoms. If symptoms do occur, they can include:    Thin, gray, milky-white, or sometimes green discharge    Unpleasant odor or fishy smell    Itching, burning, or pain in or around the vagina  It is not known what causes BV, but certain factors can make the problem more likely. This can include:    Douching    Having sex with a new partner    Having sex with more than one partner  BV will sometimes go away on its own. But treatment is usually recommended. This is because untreated BV can increase the risk of more serious health problems such as:    Pelvic inflammatory disease (PID)     delivery (giving birth to a baby early if youre pregnant)    HIV and certain other sexually transmitted diseases (STDs)    Infection after surgery on the reproductive organs  Home care  General care    BV is most often treated with medicines called antibiotics. These may be given as pills or as a vaginal cream. If antibiotics are prescribed, be sure to use them exactly as directed. Also, be sure to complete all of the medicine, even if your symptoms go away.    Don't douche or having sex during treatment.    If you have sex with a female partner, ask your healthcare provider if she should also be treated.  Prevention    Don't douche.    Don't  have sex. If you do have sex, then take steps to lower your risk:  ? Use condoms when having sex.  ? Limit the number of sexual partners you have.  Follow-up care  Follow up with your healthcare provider, or as advised.  When to seek medical advice  Call your healthcare provider right away if:    You have a fever of 100.4 F (38 C) or higher, or as directed by your provider.    Your symptoms worsen, or they dont go away within a few days of starting treatment.    You have new pain in the lower belly or pelvic region.    You have side effects that bother you or a reaction to the pills or cream youre prescribed.    You or any partners you have sex with have new symptoms, such as a rash, joint pain, or sores.  Date Last Reviewed: 10/1/2017    8904-0068 [employer]. [address] [place] [address]70. All rights reserved. This information is not intended as a substitute for professional medical care. Always follow your healthcare professional's instructions.

## 2021-06-18 NOTE — PROGRESS NOTES
Assessment/Plan:   Acute trapezius and right neck strain  Whiplash type injury with pain appearing in neck and posterior right shoulder a day after a fall. She fell 5 days ago and landed on right arm near elbow.  Initially had elbow and forearm pain and was seen at urgent care.  Elbow xray was normal.  The next day seh had pain and stiffness right side of neck and posterior shoulder which has persisted. There is right trapezius muscle spasm and pain, decreased range of motion neck to due to pain on right side.  No radicular signs or nerve impingement, no spinous pain, shoulder motion normal, no apparent bony or rotator cuff injury. Has taken flexeril at bedtime for a long time for chronic neck issues, causes too much drowsiness during the day. Unable to take NSAIDS right now pending GI evaluation.  We will try ice and Robaxin instead of flexeril as well as rest, sling and follow up as needed. She was comfortable with this plan. Consider a therapeutic massage   - methocarbamol (ROBAXIN) 500 MG tablet; Take 1 tablet (500 mg total) by mouth 4 (four) times a day.  Dispense: 40 tablet; Refill: 0    Ice to the right shoulder and neck every couple hours - this is your antiinflammatory  Sling to ease tension on the shoulder  Robaxin (methocarbamol) 4 times a day for muscle spasm - may make you drowsy.  If not helping, may return to the flexeril every 8 hours  No work for 2 days to allow for use of muscle relaxants during the day  Tylenol 1000mg every 6 hours for pain  Follow up as needed.     Subjective:      Vicki Pierson is a 32 y.o. female who presents with pain in the right neck and shoulder.  She fell 5/28/18 and landed on her right arm.  She had been walking backward counting steps for distance while setting up a yard game when she stepped in a hole and fell backward.  No head injury.  Landed first on right elbow and forearm.  She has recently been treated for ongoing issues with right tennis elbow and the fall  flared that up.  She was seen at that time in urgent care and an elbow xray was normal.  At that time she had no other pain.  The next morning however she woke with a stiff painful right side of neck and pain right posterior shoulder.  She has continued to have muscle spasm in the shoulder area, difficulty turning head due to right neck pain. Right arm also still sore but not having specific radicular pain down the arm.  Moving the shoulder/arm certain ways can trigger sharp pains in deltoid and posterior shoulder and neck.  Some numbness/prickling of lateral right forearm and hand to the 4th and 5th fingers consistent with the ulnar distribution which has been a problem for her since the diagnosis of tennis elbow. No increased weakness. No rash. No fever or N/V.  No headache or vision changes or other symptoms of concussion. 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  She has had a cervical fusion but is not having symptoms similar to that problem.  No pain along the spine - just the muscles upper back and neck right side.  She takes flexeril at night fairly consistently even before this injury.  She does not take it during the day because it interferes with work.  She has tried heat without benefit. She cannot take NSIADS due to an ongoing GI evaluation.  Record of recent urgent care visit and xray reviewed. Remainder of ROS negative.   The following were also reviewed today:    Allergies   Allergen Reactions     Tramadol Hives     Venom-Honey Bee Anaphylaxis     Venlafaxine Anxiety and Other (See Comments)     Current Outpatient Prescriptions on File Prior to Visit   Medication Sig Dispense Refill     cyclobenzaprine (FLEXERIL) 5 MG tablet 5-10 mg every 8 hours as needed for headache and muscle spasm 30 tablet 0     ondansetron (ZOFRAN) 4 MG tablet Take 1 tablet (4 mg total) by mouth every 12 (twelve) hours as needed for nausea. 20 tablet 0     No current facility-administered medications on  file prior to visit.      Patient Active Problem List   Diagnosis     Allergic Rhinitis     Asthma     Endometriosis     Major Depression, Recurrent     Anxiety     Tension-type Headache     Abnormal Blood Chemistry     Torticollis     Neck Pain     Cervical radiculopathy     Abnormal weight loss     S/P partial hysterectomy     Asthma with acute exacerbation     Pelvic pain     Left ovarian cyst     Past Surgical History:   Procedure Laterality Date     CERVICAL FUSION N/A 11/24/2014    Procedure: Anterior Cervical Decompression Fusion Cervical 6 through Cervical 7 Bilateral;  Surgeon: Joselito Wolfe MD;  Location: [facility];  Service:      CYST REMOVAL      [place]olin     HYSTERECTOMY       OR LAP,DIAGNOSTIC ABDOMEN Left 2/13/2018    Procedure: LAPAROSCOPY LEFT  OOPHORECTOMY;  Surgeon: Letty Floyd DO;  Location: [facility];  Service: Gynecology     OR LIGATE FALLOPIAN TUBE      Description: Tubal Ligation;  Recorded: 11/21/2014;     OR TOTAL ABDOM HYSTERECTOMY      Description: Hysterectomy;  Recorded: 11/21/2014;     TUBAL LIGATION         Objective:     /62 (Patient Site: Right Arm, Patient Position: Sitting, Cuff Size: Adult Regular)  Pulse 74  Temp 98.1  F (36.7  C)  Resp 16  Wt 116 lb 9 oz (52.9 kg)  SpO2 97%  BMI 20.01 kg/m2    Physical  General Appearance: Alert, cooperative, no distress  Head: Normocephalic, without obvious abnormality, atraumatic.  No pain at the occipital insertion sites.   Eyes: Conjunctivae are normal. Extraocular movements are intact. PERRLA  Neck: No adenopathy; no pain with percussion of the cervical spine. Range of motion limited by right neck and upper back pain. There is muscle spasm and pain with palpation in the right trapezius muscle and generally in the upper arm, deltoid, pectoralis, scapular and latissimus muscles.    Extremities: normal hand . Normal wrist pulses and cap refill. Altered sensation right 5th and 4th fingers - not  new, just aggravated again.  Normal wrist range of motion. Normal elbow motion, tender over the lateral epicondyle as well as the tendons attaching there.  The olecranon is not red or swollen or tender. There is mild soreness lateral the olecranon - there is a bruise from the recent fall. No swelling or redness around the elbow. She has full range of motion at the shoulder though this is uncomfortable.  She has normal strength holding arms straight out in front of her with thumbs down against resistance. No pain along the clavicle. No deformity.  There is no apparent separation or dislocation of the shoulder. Tender over the deltoid, the biceps insertion, and specifically the right trapezius in it's entirety. There is palpable muscle spasm.  DTRs 2+ symmetric.  Strength intact.   Skin: Skin color, texture, turgor normal, no rashes or lesions  Psychiatric: Patient has a normal mood and affect.

## 2021-06-19 NOTE — PROGRESS NOTES
Assessment and Plan:     1. Back pain  Ambulatory referral to Pain Clinic   2. Neck pain  Ambulatory referral to Pain Clinic   3. Polyarthralgia  Ambulatory referral to Pain Clinic    Antinuclear Antibody (KUSH) Cascade    Rheumatoid Factor Quant    Sedimentation Rate    Lyme Antibody [place]     We will rule out underlying autoimmune disease and Lyme's due to polyarthralgias.  Will refer to pain clinic for further evaluation.  Discussed initiating Medrol Dosepak, but she declines.  She will continue over-the-counter ibuprofen and prescription cyclobenzaprine as needed.  Discussed symptomatic treatment including rest, ice, stretching activities, massage.  She will follow-up if symptoms persist or worsen.    Subjective:     Vicki is a 32 y.o. female presenting to the clinic for concerns for chronic pain.  Patient has history of a cervical cervical fusion of C6-C7 in November 2014.  Patient states she has had intermittent pain within her neck and low back since.  She feels as though she cannot walk for prolonged periods.  She has limited range of motion of her neck.  She denies radicular symptoms including numbness and tingling in her extremities.  She denies bowel and bladder incontinence and retention. Patient also has flares of pain within her hips, shoulders, elbows, and hands.  She has been soaking in a warm tub.  Her flu flare developed 5 days ago.  She has tried ibuprofen and cyclobenzaprine with no relief.  She has been taking Zofran for nausea and has been gastroenterology for chronic abdominal discomfort and nausea.    Review of Systems: A complete 14 point review of systems was obtained and is negative or as stated in the history of present illness.    Social History     Social History     Marital status: Single     Spouse name: N/A     Number of children: N/A     Years of education: N/A     Occupational History     Not on file.     Social History Main Topics     Smoking status: Former Smoker     Smokeless  "tobacco: Former User     Quit date: 9/2/2003      Comment: quit smoking 14 yrs ago     Alcohol use Yes      Comment: rare     Drug use: No     Sexual activity: Not on file     Other Topics Concern     Not on file     Social History Narrative       Active Ambulatory Problems     Diagnosis Date Noted     Allergic Rhinitis      Asthma      Endometriosis      Major Depression, Recurrent      Anxiety      Tension-type Headache      Abnormal Blood Chemistry      Torticollis      Neck Pain      Cervical radiculopathy 11/24/2014     Abnormal weight loss 01/22/2015     S/P partial hysterectomy 03/09/2015     Pelvic pain 02/13/2018     Left ovarian cyst 02/13/2018     Resolved Ambulatory Problems     Diagnosis Date Noted     External Hemorrhoids      Lump In / On The Skin      Upper Respiratory Infection      Palpitations      Atypical Chest Pain      Hematemesis 03/09/2015     Asthma with acute exacerbation 05/15/2010     Past Medical History:   Diagnosis Date     Allergic Rhinitis      Anxiety      Arthritis      Asthma      Cervical disc displacement      Depression      Endometriosis      External hemorrhoid      Groin cyst      Heart palpitations      History of anesthesia complications      Major Depression, Recurrent      Mild Persistent Asthma      PONV (postoperative nausea and vomiting)      Sleep apnea        Family History   Problem Relation Age of Onset     No Medical Problems Mother      Lung cancer Father      Stroke Father      Cancer Maternal Grandmother      Cancer Maternal Grandfather      Diabetes Paternal Grandmother        Objective:     /70  Pulse 92  Ht 5' 4\" (1.626 m)  Wt 116 lb (52.6 kg)  BMI 19.91 kg/m2    Patient is alert, in no obvious distress.   Skin: Warm, dry.   Lungs:  Clear to auscultation. Respirations even and unlabored.  No wheezing or rales noted.   Heart:  Regular rate and rhythm.  No murmurs. *  Musculoskeletal:  Full ROM of extremities.  DTRs symmetrical, sensations intact.  " No obvious deformity.  Muscle strength equal +5/5.  She is tender to palpation of the bilateral sternocleidomastoid and trapezius muscles and lower lumbar paraspinous muscles.

## 2021-06-19 NOTE — ANESTHESIA PREPROCEDURE EVALUATION
Anesthesia Evaluation      Patient summary reviewed   History of anesthetic complications (PONV)     Airway   Mallampati: I  Neck ROM: full   Pulmonary - normal exam    breath sounds clear to auscultation  (+) asthma  mild,   (-) not a smoker (Former)                         Cardiovascular - negative ROS and normal exam  Exercise tolerance: > or = 4 METS  (-) murmur  ECG reviewed  Rhythm: regular  Rate: normal,    no murmur      Neuro/Psych    (+) depression,     Comments: S/p cervical neck fusion    Endo/Other    (+) arthritis,      GI/Hepatic/Renal - negative ROS           Dental - normal exam                        Anesthesia Plan  Planned anesthetic: general endotracheal and total IV anesthesia  Decadron 10 mg IV  Zofran  TIVA  Scopolamine patch  ASA 2   Induction: intravenous   Anesthetic plan and risks discussed with: patient  Anesthesia plan special considerations: antiemetics,   Post-op plan: routine recovery

## 2021-06-19 NOTE — PROGRESS NOTES
"Assessment/Plan:    1. Perirectal abscess  Reviewed findings consistent with perirectal abscess with small intramural abscess of the anterior wall of the rectum near the anal rectal junction identified on MRI of August 11, 2018.  Patient is recommended to follow[facility]up with [facility].  Empiric use of Augmentin 875/125 twice daily ×10 days was provided.  Sitz baths.  Notify if worsening or nonimprovement.  - amoxicillin-clavulanate (AUGMENTIN) 875-125 mg per tablet; Take 1 tablet by mouth 2 (two) times a day for 10 days.  Dispense: 20 tablet; Refill: 0    2.  External hemorrhoids  Symptomatic management for external hemorrhoids reviewed status post prior hemorrhoidectomy July 2018.        Subjective:    Vicki Pierson is seen today for evaluation of rectal pain.  Had prior hemorrhoidectomy in July.  Seen by Anette Klein MD with [facility].  Since this time has developed discomfort.  Was seen in our office August 9, 2018 by physician assistant.  Chronic anal fissure with pus described.  Subsequent MRI ordered August 11, 2018 with small intramural abscess of the anterior wall of the rectum near the anorectal junction.  Patient states \"hot all the time\" however this is her baseline without current fevers, chills etc.  Decreased appetite.  Question of some mucus in stools otherwise no significant diarrhea or constipation issues.  Comprehensive review of systems as above otherwise all negative.    Past Surgical History:   Procedure Laterality Date     CERVICAL FUSION N/A 11/24/2014    Procedure: Anterior Cervical Decompression Fusion Cervical 6 through Cervical 7 Bilateral;  Surgeon: Joselito Wolfe MD;  [place] [facility] [place]:      CERVICAL FUSION Bilateral      CYST REMOVAL      Bartholin     HEMORROIDECTOMY N/A 7/19/2018    Procedure: HEMORRHOIDECTOMY;  Surgeon: Anette Klein MD;  Location: [facility];  Service:      HYSTERECTOMY       " OOPHORECTOMY Left      VT LAP,DIAGNOSTIC ABDOMEN Left 2/13/2018    Procedure: LAPAROSCOPY LEFT  OOPHORECTOMY;  Surgeon: Letty Floyd DO;  Location: [facility];  Service: Gynecology     VT LIGATE FALLOPIAN TUBE      Description: Tubal Ligation;  Recorded: 11/21/2014;     VT TOTAL ABDOM HYSTERECTOMY      Description: Hysterectomy;  Recorded: 11/21/2014;     TUBAL LIGATION          Family History   Problem Relation Age of Onset     No Medical Problems Mother      Lung cancer Father      Stroke Father      Cancer Maternal Grandmother      Cancer Maternal Grandfather      Diabetes Paternal Grandmother         Past Medical History:   Diagnosis Date     Allergic Rhinitis     Created by Conversion      Anxiety     Created by Conversion      Arthritis      Asthma      Cervical disc displacement      Depression      Endometriosis      External hemorrhoid      Groin cyst      Heart palpitations     wore holter monitor for 24 hours     History of anesthesia complications     itchy in recovery     Major Depression, Recurrent     Created by Conversion      Mild Persistent Asthma     Created by Conversion      PONV (postoperative nausea and vomiting)         Social History   Substance Use Topics     Smoking status: Former Smoker     Smokeless tobacco: Former User     Quit date: 9/2/2003      Comment: quit smoking 14 yrs ago     Alcohol use 0.6 oz/week     1 Shots of liquor per week      Comment: rare        Current Outpatient Prescriptions   Medication Sig Dispense Refill     ondansetron (ZOFRAN) 4 MG tablet Take 1 tablet (4 mg total) by mouth every 12 (twelve) hours as needed for nausea. 20 tablet 0     oxyCODONE-acetaminophen (PERCOCET) 5-325 mg per tablet Take 1-2 tablets by mouth every 4 (four) hours as needed for pain (moderate pain). 40 tablet 0     amoxicillin-clavulanate (AUGMENTIN) 875-125 mg per tablet Take 1 tablet by mouth 2 (two) times a day for 14 days. 28 tablet 0     No current facility-administered  medications for this visit.           Objective:    Vitals:    08/15/18 1204   BP: 100/60   Pulse: 90   SpO2: 97%   Weight: 116 lb (52.6 kg)      Body mass index is 19.91 kg/(m^2).    Alert.  Mild distress at rest.  Tearful.  Transfers independently.  Chest clear.  Cardiac exam regular.  Perianal exam with external hemorrhoids nonthrombosed noted.  Mild tenderness at the anus without ability to complete further exam due to patient apprehension.  No purulent drainage or discharge noted.      This note has been dictated using voice recognition software and as a result may contain minor grammatical errors and unintended word substitutions.

## 2021-06-19 NOTE — PROGRESS NOTES
Preoperative Exam    Scheduled Procedure: Hemrrhoidectomy  Surgery Date:  7/18/19  Surgery Location: [facility], fax [phone]    Surgeon:  Dr. Anette Klein    Assessment/Plan:     1. Encounter for preoperative examination for general surgical procedure  - Hemoglobin    2. Hemorrhoids, unspecified hemorrhoid type      3. Mild intermittent asthma without complication    Surgical Procedure Risk: Low (reported cardiac risk generally < 1%)  Have you had prior anesthesia?: Yes  Have you or any family members had a previous anesthesia reaction:  No  Do you or any family members have a history of a clotting or bleeding disorder?: No  Cardiac Risk Assessment: no increased risk for major cardiac complications    Patient approved for surgery with general or local anesthesia.    Functional Status: Independent  Patient plans to recover at home with family.     Subjective:      Vicki Pierson is a 32 y.o. female who presents for a preoperative consultation.  Patient states she has had hemorrhoids for multiple years.  She has had intermittent rectal bleeding with constipation.  She has pain with bowel movements.  She has been seeing gastroenterology for chronic nausea.  She has not tried any over-the-counter products for her symptoms.    She has asthma and uses Ventolin as needed.  She denies any asthma flares.  She has chronic neck pain status post cervical fusion.  She takes cyclobenzaprine as needed.  All other systems reviewed and are negative, other than those listed in the HPI.    Pertinent History  Do you have difficulty breathing or chest pain after walking up a flight of stairs: No  History of obstructive sleep apnea: No  Steroid use in the last 6 months: No  Frequent Aspirin/NSAID use: Yes: ibuprofen  Prior Blood Transfusion: No  Prior Blood Transfusion Reaction: No  If for some reason prior to, during or after the procedure, if it is medically indicated, would you be willing to have a blood  transfusion?:  There is no transfusion refusal.    Current Outpatient Prescriptions   Medication Sig Dispense Refill     ondansetron (ZOFRAN) 4 MG tablet Take 1 tablet (4 mg total) by mouth every 12 (twelve) hours as needed for nausea. 20 tablet 0   Patient is able to continue the medication   No current facility-administered medications for this visit.         Allergies   Allergen Reactions     Tramadol Hives     Venom-Honey Bee Anaphylaxis     Venlafaxine Anxiety and Other (See Comments)       Patient Active Problem List   Diagnosis     Allergic Rhinitis     Asthma     Endometriosis     Major Depression, Recurrent     Anxiety     Tension-type Headache     Abnormal Blood Chemistry     Torticollis     Neck Pain     Cervical radiculopathy     Abnormal weight loss     S/P partial hysterectomy     Pelvic pain     Left ovarian cyst       Past Medical History:   Diagnosis Date     Allergic Rhinitis     Created by Conversion      Anxiety     Created by Conversion      Arthritis      Asthma      Cervical disc displacement      Depression      Endometriosis      External hemorrhoid      Groin cyst      Heart palpitations     wore holter monitor for 24 hours     History of anesthesia complications     itchy in recovery     Major Depression, Recurrent     Created by Conversion      Mild Persistent Asthma     Created by Conversion      PONV (postoperative nausea and vomiting)      Sleep apnea        Past Surgical History:   Procedure Laterality Date     CERVICAL FUSION N/A 11/24/2014    Procedure: Anterior Cervical Decompression Fusion Cervical 6 through Cervical 7 Bilateral;  Surgeon: Joselito Wolfe MD;  Location: [facility];  Service:      CYST REMOVAL      Bartholin     HYSTERECTOMY       AR LAP,DIAGNOSTIC ABDOMEN Left 2/13/2018    Procedure: LAPAROSCOPY LEFT  OOPHORECTOMY;  Surgeon: Letty Floyd DO;  Location: [facility];  Service: Gynecology     AR LIGATE FALLOPIAN TUBE      Description: Tubal  "Ligation;  Recorded: 11/21/2014;     WA TOTAL ABDOM HYSTERECTOMY      Description: Hysterectomy;  Recorded: 11/21/2014;     TUBAL LIGATION         Social History     Social History     Marital status: Single     Spouse name: N/A     Number of children: N/A     Years of education: N/A     Occupational History     Not on file.     Social History Main Topics     Smoking status: Former Smoker     Smokeless tobacco: Former User     Quit date: 9/2/2003      Comment: quit smoking 14 yrs ago     Alcohol use Yes      Comment: rare     Drug use: No     Sexual activity: Not on file     Other Topics Concern     Not on file     Social History Narrative       Patient Care Team:  Sophie Gibson CNP as PCP - General          Objective:     Vitals:    07/02/18 1359   BP: 94/68   Pulse: 66   Weight: 116 lb 4.8 oz (52.8 kg)   Height: 5' 4\" (1.626 m)         Physical Exam:  Physical Exam  BP 94/68  Pulse 66  Ht 5' 4\" (1.626 m)  Wt 116 lb 4.8 oz (52.8 kg)  BMI 19.96 kg/m2    General Appearance:    Alert, cooperative, no distress, appears stated age   Head:    Normocephalic, without obvious abnormality, atraumatic   Eyes:    PERRL, conjunctiva/corneas clear, EOM's intact, fundi     benign, both eyes   Ears:    Normal TM's and external ear canals, both ears   Nose:   Nares normal, septum midline, mucosa normal, no drainage     or sinus tenderness   Throat:   Lips, mucosa, and tongue normal; teeth and gums normal   Neck:   Supple, symmetrical, trachea midline, no adenopathy;     thyroid:  no enlargement/tenderness/nodules; no carotid    bruit or JVD   Back:     Symmetric, no curvature, ROM normal, no CVA tenderness   Lungs:     Clear to auscultation bilaterally, respirations unlabored   Chest Wall:    No tenderness or deformity    Heart:    Regular rate and rhythm, S1 and S2 normal, no murmur, rub    or gallop   Breast Exam:    deferred   Abdomen:     Soft, non-tender, bowel sounds active all four quadrants,     no masses, no " organomegaly   Genitalia:    deferred   Rectal:    deferred   Extremities:   Extremities normal, atraumatic, no cyanosis or edema   Pulses:   2+ and symmetric all extremities   Skin:   Skin color, texture, turgor normal, no rashes or lesions   Lymph nodes:   Cervical, supraclavicular, and axillary nodes normal   Neurologic:   CNII-XII intact, normal strength, sensation and reflexes     throughout     Patient Instructions     Hold all supplements, aspirin and NSAIDs for 7 days prior to surgery.  Follow your surgeon's direction on when to stop eating and drinking prior to surgery.  Your surgeon will be managing your pain after your surgery.    Remove all jewelry and metal piercings before your surgery.   Remove nail polish from fingers before surgery.        Labs:  Recent Results (from the past 24 hour(s))   Hemoglobin    Collection Time: 07/02/18  2:22 PM   Result Value Ref Range    Hemoglobin 18.8 (H) 12.0 - 16.0 g/dL       Immunization History   Administered Date(s) Administered     Influenza, seasonal,quad inj 36+ mos 09/25/2012     PPD Test 04/26/2016     Td,adult,historic,unspecified 03/01/2006     Tdap 04/12/2011           Electronically signed by Sophie Gibson CNP 07/02/18 2:05 PM

## 2021-06-19 NOTE — PROGRESS NOTES
Did not show.  Patient been referred by PCP on 7/3 with concerns for autoimmune conditions.    She has previously been seen at the pain clinic, though did not show on 9/12/16, 7/22/16//6/16.  She had been last seen on 1/15/16 in which a urine drug screen was negative for prescribed oxycodone positive for marijuana.    Will not be rescheduled the pain center until I speak to the primary care provider.

## 2021-06-19 NOTE — LETTER
Letter by Wilson Garrido MD at      Author: Wilson Garrido MD Service: -- Author Type: --    Filed:  Encounter Date: 10/24/2019 Status: Signed         October 24, 2019     Patient: Vicki Pierson   YOB: 1985   Date of Visit: 10/24/2019       To Whom It May Concern:    It is my medical opinion that Vicki Pierson should remain out of work until 10/25.    If you have any questions or concerns, please don't hesitate to call.    Sincerely,        Electronically signed by Wilson Garrido MD

## 2021-06-19 NOTE — ANESTHESIA CARE TRANSFER NOTE
Last vitals:   Vitals:    07/19/18 0910   BP: 109/67   Pulse: 73   Resp: 16   Temp: 36.2  C (97.1  F)   SpO2: 100%     Patient's level of consciousness is drowsy  Spontaneous respirations: yes  Maintains airway independently: yes  Dentition unchanged: yes  Oropharynx: oropharynx clear of all foreign objects    QCDR Measures:  ASA# 20 - Surgical Safety Checklist: WHO surgical safety checklist completed prior to induction  PQRS# 430 - Adult PONV Prevention: 4558F - Pt received => 2 anti-emetic agents (different classes) preop & intraop  ASA# 8 - Peds PONV Prevention: NA - Not pediatric patient, not GA or 2 or more risk factors NOT present  PQRS# 424 - Amparo-op Temp Management: 4559F - At least one body temp DOCUMENTED => 35.5C or 95.9F within required timeframe  PQRS# 426 - PACU Transfer Protocol: - Transfer of care checklist used  ASA# 14 - Acute Post-op Pain: ASA14B - Patient did NOT experience pain >= 7 out of 10

## 2021-06-19 NOTE — LETTER
Letter by Sophie Gibson CNP at      Author: Sophie Gibson CNP Service: -- Author Type: --    Filed:  Encounter Date: 6/3/2019 Status: (Other)        [facility] FAMILY [facility]/OB  [facility] [facility] [address]  [place] [facility] [address]  [phone]         Vicki Pierson  [address] [place] [address]        06/03/19    Dear Vicki Pierson,     At [facility] we care about your health and well-being. Your primary care provider is committed to ensuring you receive high quality care and has chosen a network of specialists to assist in providing that care. Recently Sophie Gibson CNP referred you to [facility] for specialty care.       It is important to your overall health to follow through with the recommendation from your provider. Please call [facility] ([phone]) at your earliest convenience for assistance in scheduling an appointment. If you have already scheduled this appointment, please disregard this notice. Thank you for choosing [facility] for your healthcare needs.       Sincerely,       [facility] Specialty Scheduling   Sophie Gibson CNP

## 2021-06-19 NOTE — LETTER
Letter by Sophie Gibson CNP at      Author: Sophie Gibson CNP Service: -- Author Type: --    Filed:  Encounter Date: 10/10/2019 Status: Signed         October 10, 2019     Patient: Vicki Pierson   YOB: 1985   Date of Visit: 10/10/2019       To Whom it May Concern:    Vicki Pierson was seen in my clinic on 10/10/2019. Please excuse patient from work on 10/9/19-10/11/19.      If you have any questions or concerns, please don't hesitate to call.    Sincerely,         Electronically signed by Sophie Gibson CNP

## 2021-06-19 NOTE — ANESTHESIA POSTPROCEDURE EVALUATION
Patient: Vicki Pierson  HEMORRHOIDECTOMY  Anesthesia type: general    Patient location: Phase II Recovery  Last vitals:   Vitals:    07/19/18 0930   BP:    Pulse:    Resp: 16   Temp:    SpO2:      Post vital signs: stable  Level of consciousness: awake, alert and oriented  Post-anesthesia pain: pain controlled  Post-anesthesia nausea and vomiting: no  Pulmonary: unassisted, return to baseline  Cardiovascular: stable and blood pressure at baseline  Hydration: adequate  Anesthetic events: no    QCDR Measures:  ASA# 11 - Amparo-op Cardiac Arrest: ASA11B - Patient did NOT experience unanticipated cardiac arrest  ASA# 12 - Amparo-op Mortality Rate: ASA12B - Patient did NOT die  ASA# 13 - PACU Re-Intubation Rate: ASA13B - Patient did NOT require a new airway mgmt  ASA# 10 - Composite Anes Safety: ASA10A - No serious adverse event    Additional Notes:

## 2021-06-20 NOTE — LETTER
Letter by Monae Humphries CHW at      Author: Monae Humphries CHW Service: -- Author Type: --    Filed:  Encounter Date: 7/14/2020 Status: (Other)       CARE COORDINATION  [facility]  [address]  [place] [address]    July 15, 2020    Vicki Pierson  [address] [address]      Dear Vicki,    I am a clinic community health worker  who works with Sophie Gibson CNP at [employer]. I recently tried to call and was unable to reach you. Below is a description of clinic care coordination and how I can further assist you.      The clinic care coordination team is made up of a registered nurse,  and community health worker who understand the health care system. The goal of clinic care coordination is to help you manage your health and improve access to the health care system in the most efficient manner. The team can assist you in meeting your health care goals by providing education, coordinating services, strengthening the communication among your providers and supporting you with any resource needs.    Please feel free to contact the Community Health Worker at [phone] with any questions or concerns. We are focused on providing you with the highest-quality healthcare experience possible and that all starts with you.     Sincerely,   Monae

## 2021-06-20 NOTE — LETTER
Letter by Letty Esquivel MD at      Author: Letty Esquivel MD Service: -- Author Type: --    Filed:  Encounter Date: 2/21/2020 Status: (Other)         February 21, 2020     Patient: Vicki Pierson   YOB: 1985   Date of Visit: 2/21/2020       To Whom It May Concern:    It is my medical opinion that Vicki Pierson may return to work tomorrow (2/24) if feeling better. Please excuse her from today's visit and on 2/18 secondary to symptoms.     If you have any questions or concerns, please don't hesitate to call.    Sincerely,        Electronically signed by Letty Esquivel MD

## 2021-06-21 NOTE — PROGRESS NOTES
YOLANDA Pierson is a 32 y.o. female who presents today complaining of vaginal odor for the past couple of days.  She has a past medical history of bacterial vaginosis.  She has a monogamous relationship with her fiancé.  She states that if they have sex a few times in a row she typically ends up getting bacterial vaginosis.  Her last episode was about a month ago, but upon review of chart and care everywhere it appears to have been 6 months ago.  She is not interested in any STD testing today.  She has a follow-up appointment with her OB/GYN scheduled.  She reports some mild abdominal cramping, but she suspects that that may be secondary to taking a tablet of clindamycin which she had at home.         Social History     Tobacco Use     Smoking status: Former Smoker     Smokeless tobacco: Former User     Quit date: 9/2/2003     Tobacco comment: quit smoking 14 yrs ago   Substance Use Topics     Alcohol use: Yes     Alcohol/week: 0.6 oz     Types: 1 Shots of liquor per week     Comment: rare       Review of Systems   Constitutional: Negative for fever.   Gastrointestinal: Positive for abdominal pain (Mild abdominal cramping). Negative for nausea and vomiting.   Genitourinary: Positive for vaginal discharge.        (+) vaginal odor       Physical Exam   Constitutional: She appears well-developed and well-nourished. No distress.   HENT:   Head: Normocephalic and atraumatic.   Eyes: Conjunctivae are normal.   Pulmonary/Chest: Effort normal.   Genitourinary: There is no rash or lesion on the right labia. There is no rash or lesion on the left labia.   Psychiatric: She has a normal mood and affect. Her behavior is normal. Judgment and thought content normal.       Labs:  Recent Results (from the past 24 hour(s))   Wet Prep, Vaginal   Result Value Ref Range    Yeast Result No yeast seen No yeast seen    Trichomonas No Trichomonas seen No Trichomonas seen    Clue Cells, Wet Prep No Clue cells seen No Clue cells seen          1. Vaginal odor  Wet Prep, Vaginal    metroNIDAZOLE (METROGEL VAGINAL) 0.75 % vaginal gel         Patient Instructions   1. Today your wet prep was negative for bacterial vaginosis, yeast, and trichomonas.   2. Please follow up with OB/GYN for further evaluation and testing if this Metrogel is not resolving your symptoms.

## 2021-06-21 NOTE — PROGRESS NOTES
Assessment and Plan:     1. Chronic nonintractable headache, unspecified headache type  Differentials include tension headache, migraine headache, sinusitis, allergies.  Will obtain brain MRI for further evaluation due to changes in the headache.  Will also refer to neurology.  Provided prescription for cyclobenzaprine to use as needed.  She is to avoid taking this with other sedatives.  - MR Brain With Without Contrast; Future  - Ambulatory referral to Neurology    2. Acute non-recurrent frontal sinusitis  Will treat with Augmentin.  Educated on its indications and side effects. Discussed symptomatic treatment including OTC nasal saline sprays.  If no improvement with symptoms, the patient is to follow-up.  - amoxicillin-clavulanate (AUGMENTIN) 875-125 mg per tablet; Take 1 tablet by mouth 2 (two) times a day for 10 days.  Dispense: 20 tablet; Refill: 0    3. Moderate episode of recurrent major depressive disorder (H)  Obtained phq9 score of 3.  This is controlled without medication.      The patient is content with the plan and will follow-up in 6 months for medication management or sooner with any further concerns.       Subjective:     Vicki is a 32 y.o. female presenting to the clinic for concerns of a headache.  Patient has had a intermittent headaches since her 20's.  She has tried numerous medications including naproxen, methocarbamol, amitriptyline, Fioricet, Midrin.  She has also tried acupuncture. She has not had a brain MRI.  Patient states over the past 2 months, her headaches have become more constant and have been occurring on a daily basis.  She describes a headache as being within the occipital region.  Occasionally, this radiates to the bilateral temporal region.  She will occasionally vomit due to the headache.  She denies numbness and tingling of her extremities and muscular weakness.  The headache generally last for 2 hours until she takes ibuprofen.  She has also had cold symptoms for 1 month  including productive cough, sinus congestion, postnasal drainage.  She denies stomachache, nausea, vomiting, fever.  She has not tried any over-the-counter products for her symptoms.  Her son is also ill.  Patient states her headache worsens when she coughs.    Review of Systems: A complete 14 point review of systems was obtained and is negative or as stated in the history of present illness.    Social History     Social History     Marital status: Single     Spouse name: N/A     Number of children: N/A     Years of education: N/A     Occupational History     Not on file.     Social History Main Topics     Smoking status: Former Smoker     Smokeless tobacco: Former User     Quit date: 9/2/2003      Comment: quit smoking 14 yrs ago     Alcohol use 0.6 oz/week     1 Shots of liquor per week      Comment: rare     Drug use: Yes     Special: Marijuana      Comment: recreational     Sexual activity: Not on file     Other Topics Concern     Not on file     Social History Narrative       Active Ambulatory Problems     Diagnosis Date Noted     Allergic Rhinitis      Asthma      Endometriosis      Major Depression, Recurrent      Anxiety      Tension-type Headache      Abnormal Blood Chemistry      Torticollis      Neck Pain      Cervical radiculopathy 11/24/2014     Abnormal weight loss 01/22/2015     S/P partial hysterectomy 03/09/2015     Pelvic pain 02/13/2018     Left ovarian cyst 02/13/2018     Resolved Ambulatory Problems     Diagnosis Date Noted     External Hemorrhoids      Lump In / On The Skin      Upper Respiratory Infection      Palpitations      Atypical Chest Pain      Hematemesis 03/09/2015     Asthma with acute exacerbation 05/15/2010     Past Medical History:   Diagnosis Date     Allergic Rhinitis      Anxiety      Arthritis      Asthma      Cervical disc displacement      Depression      Endometriosis      External hemorrhoid      Groin cyst      Heart palpitations      History of anesthesia complications   "    Major Depression, Recurrent      Mild Persistent Asthma      PONV (postoperative nausea and vomiting)        Family History   Problem Relation Age of Onset     No Medical Problems Mother      Lung cancer Father      Stroke Father      Cancer Maternal Grandmother      Cancer Maternal Grandfather      Diabetes Paternal Grandmother        Objective:     /58  Pulse 78  Ht 5' 4\" (1.626 m)  Wt 123 lb 6.4 oz (56 kg)  BMI 21.18 kg/m2    Patient is alert, in no obvious distress.   Skin: Warm, dry.  No lesions or rashes.  Skin turgor rapid return.   HEENT:  Head normocephalic, atraumatic.  Eyes normal.  PERRL.  EOM's intact.  No nystagmus. Ears normal.  Nose patent, mucosa red.  Oropharynx mucosa pink.  No lesions or tonsillar enlargement.   Neck: Supple, no lymphadenopathy.  No thyromegaly.  Lungs:  Clear to auscultation. Respirations even and unlabored.  No wheezing or rales noted.   Heart:  Regular rate and rhythm.  No murmurs, S3, S4, gallops, or rubs.    Musculoskeletal:  Full ROM of extremities.  DTRs symmetrical, sensations intact.  No obvious deformity.  Muscle strength equal +5/5.   Neurological:  Cranial nerves 2-12 intact.                "

## 2021-06-22 NOTE — PROGRESS NOTES
Assessment and Plan:     1. Chronic neck pain  Ambulatory referral to Pain Clinic     Patient likely would like referral to another pain clinic.  Provided referral and information regarding other pain clinics outside of [facility].  Patient plans on scheduling an appointment herself.  She does not request medication today.  Discussed symptomatic treatment including rest, ice, stretching activities.  She has tried physical therapy in the past with no relief.  She will continue acupuncture and will buy over-the-counter CBD oil today.  She is content with the plan.    Subjective:     Vicki is a 33 y.o. female presenting to the clinic for concerns for ongoing neck pain.  Patient has a history of a cervical fusion and since then, has been experiencing neck pain on a daily basis.  She complains of a constant ache within her neck.  She feels the need to crack her neck in the morning.  Any sort of movement exacerbates the pain.  Yesterday, she turned her head to the left and felt a shooting pain down her right arm.  Her pain is been worsening over the past 3 weeks.  She was seen previously at the [facility] pain clinic, but there were concerns regarding no-shows and possible marijuana use.  She presented to [facility] on 12/11/18 where she was provided 5 oxycodone.  Patient would like to avoid narcotics as this causes her constipation.  She uses cyclobenzaprine as needed.  She has tried acupuncture.  She is considering buying CBD oil today.    Review of Systems: A complete 14 point review of systems was obtained and is negative or as stated in the history of present illness.    Social History     Socioeconomic History     Marital status: Single     Spouse name: Not on file     Number of children: Not on file     Years of education: Not on file     Highest education level: Not on file   Social Needs     Financial resource strain: Not on file     Food insecurity - worry: Not on file     Food insecurity - inability: Not on  file     Transportation needs - medical: Not on file     Transportation needs - non-medical: Not on file   Occupational History     Not on file   Tobacco Use     Smoking status: Former Smoker     Smokeless tobacco: Former User     Quit date: 9/2/2003     Tobacco comment: quit smoking 14 yrs ago   Substance and Sexual Activity     Alcohol use: Yes     Alcohol/week: 0.6 oz     Types: 1 Shots of liquor per week     Comment: rare     Drug use: Yes     Types: Marijuana     Comment: recreational     Sexual activity: Not on file   Other Topics Concern     Not on file   Social History Narrative     Not on file       Active Ambulatory Problems     Diagnosis Date Noted     Allergic Rhinitis      Asthma      Endometriosis      Major Depression, Recurrent      Anxiety      Tension-type Headache      Abnormal Blood Chemistry      Torticollis      Neck Pain      Cervical radiculopathy 11/24/2014     Abnormal weight loss 01/22/2015     S/P partial hysterectomy 03/09/2015     Pelvic pain 02/13/2018     Left ovarian cyst 02/13/2018     Resolved Ambulatory Problems     Diagnosis Date Noted     External Hemorrhoids      Lump In / On The Skin      Upper Respiratory Infection      Palpitations      Atypical Chest Pain      Hematemesis 03/09/2015     Asthma with acute exacerbation 05/15/2010     Past Medical History:   Diagnosis Date     Allergic Rhinitis      Anxiety      Arthritis      Asthma      Cervical disc displacement      Depression      Endometriosis      External hemorrhoid      Groin cyst      Heart palpitations      History of anesthesia complications      Major Depression, Recurrent      Mild Persistent Asthma      PONV (postoperative nausea and vomiting)        Family History   Problem Relation Age of Onset     No Medical Problems Mother      Lung cancer Father      Stroke Father      Cancer Maternal Grandmother      Cancer Maternal Grandfather      Diabetes Paternal Grandmother        Objective:     BP 90/58   Pulse 64   " Ht 5' 4\" (1.626 m)   Wt 122 lb 14.4 oz (55.7 kg)   BMI 21.10 kg/m      Patient is alert, in no obvious distress.   Skin: Warm, dry.    Lungs:  Clear to auscultation. Respirations even and unlabored.  No wheezing or rales noted.   Heart:  Regular rate and rhythm.  No murmurs, S3, S4, gallops, or rubs.    Abdomen: Soft, nontender.  No organomegaly. Bowel sounds normoactive. No guarding or masses noted.   Musculoskeletal:  She has full ROM of her neck.  She is tender to palpation of her bilateral sternocleidomastoid and trapezius muscles.                 "

## 2021-06-23 NOTE — TELEPHONE ENCOUNTER
RN cannot approve Refill Request    RN can NOT refill this medication med is not covered by policy/route to provider.     Last office visit: 12/18/2018 Sophie Gibson CNP Last Physical: 7/2/2018 Last MTM visit: Visit date not found Last visit same specialty: 12/18/2018 Sophie Gibson CNP.  Next visit within 3 mo: Visit date not found  Next physical within 3 mo: Visit date not found      Adriel Estevez, [facility] Triage/Med Refill 1/10/2019    Requested Prescriptions   Pending Prescriptions Disp Refills     ondansetron (ZOFRAN) 4 MG tablet 20 tablet 0     Sig: Take 1 tablet (4 mg total) by mouth every 12 (twelve) hours as needed for nausea.    There is no refill protocol information for this order

## 2021-06-23 NOTE — TELEPHONE ENCOUNTER
Refill Request  Did you contact pharmacy: Yes  Medication name:   Requested Prescriptions     Pending Prescriptions Disp Refills     ondansetron (ZOFRAN) 4 MG tablet 20 tablet 0     Sig: Take 1 tablet (4 mg total) by mouth every 12 (twelve) hours as needed for nausea.     Who prescribed the medication: Dr. Aguilar   Pharmacy Name and Location: [facility]   Is patient out of medication: Yes  Patient notified refills processed in 72 hours:  yes  Okay to leave a detailed message: yes

## 2021-06-24 NOTE — PROGRESS NOTES
Assessment and Plan:     1. Neck Pain  Ambulatory referral to Pain Clinic    cyclobenzaprine (FLEXERIL) 5 MG tablet   2. Cervical radiculopathy  Ambulatory referral to Pain Clinic    cyclobenzaprine (FLEXERIL) 5 MG tablet   3. Nausea  ondansetron (ZOFRAN) 4 MG tablet      Patient has myofascial pain.  Discussed symptomatic treatment including rest, ice, stretching activities. Will treat with cyclobenzaprine and zofran as needed.  Will refer to [facility] pain clinic for further evaluation. She is content with the plan.     Subjective:     Vicki is a 33 y.o. female presenting to the clinic for medication management and referral. Patient has a history of a cervical fusion and since then, has been experiencing neck pain on a daily basis.  She was seen previously at the [facility] pain clinic, but there were concerns regarding no-shows and possible marijuana use.  She was scheduled at the [facility] pain clinic but they canceled 4 times.  Patient is interested in seeing [facility] pain clinic but needs a referral specifying this.  Patient woke up on Saturday with neck pain.  She feels as though she has limited range of motion of her neck.  Patient complains of a constant tension within her neck.  She denies numbness and tingling of her extremities and muscular weakness.  She tends to have nausea with the pain. She requests prescription for Zofran and cyclobenzaprine.    Review of Systems: A complete 14 point review of systems was obtained and is negative or as stated in the history of present illness.    Social History     Socioeconomic History     Marital status: Single     Spouse name: Not on file     Number of children: Not on file     Years of education: Not on file     Highest education level: Not on file   Occupational History     Not on file   Social Needs     Financial resource strain: Not on file     Food insecurity:     Worry: Not on file     Inability: Not on file     Transportation needs:     Medical: Not on  file     Non-medical: Not on file   Tobacco Use     Smoking status: Former Smoker     Smokeless tobacco: Former User     Quit date: 9/2/2003     Tobacco comment: quit smoking 14 yrs ago   Substance and Sexual Activity     Alcohol use: Yes     Alcohol/week: 0.6 oz     Types: 1 Shots of liquor per week     Comment: rare     Drug use: Yes     Types: Marijuana     Comment: recreational     Sexual activity: Not on file   Lifestyle     Physical activity:     Days per week: Not on file     Minutes per session: Not on file     Stress: Not on file   Relationships     Social connections:     Talks on phone: Not on file     Gets together: Not on file     Attends Latter-day service: Not on file     Active member of club or organization: Not on file     Attends meetings of clubs or organizations: Not on file     Relationship status: Not on file     Intimate partner violence:     Fear of current or ex partner: Not on file     Emotionally abused: Not on file     Physically abused: Not on file     Forced sexual activity: Not on file   Other Topics Concern     Not on file   Social History Narrative     Not on file       Active Ambulatory Problems     Diagnosis Date Noted     Allergic Rhinitis      Asthma      Endometriosis      Major Depression, Recurrent      Anxiety      Tension-type Headache      Abnormal Blood Chemistry      Torticollis      Neck Pain      Cervical radiculopathy 11/24/2014     Abnormal weight loss 01/22/2015     S/P partial hysterectomy 03/09/2015     Pelvic pain 02/13/2018     Left ovarian cyst 02/13/2018     Resolved Ambulatory Problems     Diagnosis Date Noted     External Hemorrhoids      Lump In / On The Skin      Upper Respiratory Infection      Palpitations      Atypical Chest Pain      Hematemesis 03/09/2015     Asthma with acute exacerbation 05/15/2010     Past Medical History:   Diagnosis Date     Allergic Rhinitis      Anxiety      Arthritis      Asthma      Cervical disc displacement      Depression   "    Endometriosis      External hemorrhoid      Groin cyst      Heart palpitations      History of anesthesia complications      Major Depression, Recurrent      Mild Persistent Asthma      PONV (postoperative nausea and vomiting)        Family History   Problem Relation Age of Onset     No Medical Problems Mother      Lung cancer Father      Stroke Father      Cancer Maternal Grandmother      Cancer Maternal Grandfather      Diabetes Paternal Grandmother        Objective:     /58   Pulse 78   Ht 5' 4\" (1.626 m)   Wt 120 lb 14.4 oz (54.8 kg)   SpO2 98%   BMI 20.75 kg/m      Patient is alert, in no obvious distress.   Skin: Warm, dry.    Lungs:  Clear to auscultation. Respirations even and unlabored.  No wheezing or rales noted.   Heart:  Regular rate and rhythm.  No murmurs.  Musculoskeletal: She has full range of motion of her neck.  She is tender to palpation of her bilateral sternocleidomastoid and trapezius muscles.              "

## 2021-06-24 NOTE — TELEPHONE ENCOUNTER
RN cannot approve Refill Request    RN can NOT refill this medication med is not covered by policy/route to provider. Last office visit: 2/26/2019 Sophie Gibson CNP Last Physical: 7/2/2018 Last MTM visit: Visit date not found Last visit same specialty: 2/26/2019 Sophie Gibson CNP.  Next visit within 3 mo: Visit date not found  Next physical within 3 mo: Visit date not found      Lizet Pink, [facility] Triage/Med Refill 3/11/2019    Requested Prescriptions   Pending Prescriptions Disp Refills     ondansetron (ZOFRAN) 4 MG tablet 20 tablet 0     Sig: Take 1 tablet (4 mg total) by mouth every 12 (twelve) hours as needed for nausea.    There is no refill protocol information for this order

## 2021-06-25 NOTE — PROGRESS NOTES
Assessment and Plan:     1. Onychomycosis  Discussed treatment options. Will start Penlac, use as directed. Educated on its indications and side effects.  - ciclopirox (PENLAC) 8 % solution; Apply over nail and surrounding skin. Apply daily over previous coat. After seven (7) days, may remove with alcohol and continue cycle.  Dispense: 6.6 mL; Refill: 2    2. Nausea  3.  Other chronic pain  Patient has been suffering from right upper extremity pain status post right shoulder arthroscopy. She is seeing a pain clinic where she is prescribed Percocet. She takes Zofran as needed for nausea.  - ondansetron (ZOFRAN-ODT) 4 MG disintegrating tablet; Take 1 tablet (4 mg total) by mouth every 8 (eight) hours as needed for nausea (or after vomiting).  Dispense: 30 tablet; Refill: 0    4. Mild recurrent major depression (H)  Obtained PHQ-9 score of 5. She is not currently taking an antidepressant. She is not interested in an antidepressant today. She feels as though her mood is stable. She is content with the plan.      Subjective:     Vicki is a 35 y.o. female presenting to the clinic for multiple concerns today.  Patient is concerned onychomycosis.  She was diagnosed multiple years ago.  She is noticing thickening of her bilateral large toenails and the fifth toe of the right foot.  She has not tried treating this with any over-the-counter products.  Patient has a history of depression and feels as though her mood is stable without medication.  Patient had a right shoulder arthroscopy on 12/21/20 for biceps tendon repair and torn labrum repair. She has been seeing a pain clinic due to chronic pain from this. She is being treated with Percocet. She denies thoughts of suicide.  She is engaged to be  July 17 and is focused on planning the wedding.  Patient requests Zofran to assist with intermittent bouts of nausea which may be from the pain medication and history of GERD.    Review of Systems: A complete 14 point review  of systems was obtained and is negative or as stated in the history of present illness.    Social History     Socioeconomic History     Marital status: Single     Spouse name: Not on file     Number of children: Not on file     Years of education: Not on file     Highest education level: Not on file   Occupational History     Not on file   Social Needs     Financial resource strain: Not on file     Food insecurity     Worry: Not on file     Inability: Not on file     Transportation needs     Medical: Not on file     Non-medical: Not on file   Tobacco Use     Smoking status: Former Smoker     Smokeless tobacco: Former User     Quit date: 9/2/2003     Tobacco comment: quit smoking 14 yrs ago   Substance and Sexual Activity     Alcohol use: Yes     Alcohol/week: 1.0 standard drinks     Types: 1 Shots of liquor per week     Comment: rare     Drug use: Yes     Types: Marijuana     Comment: recreational     Sexual activity: Not on file   Lifestyle     Physical activity     Days per week: Not on file     Minutes per session: Not on file     Stress: Not on file   Relationships     Social connections     Talks on phone: Not on file     Gets together: Not on file     Attends Latter-day service: Not on file     Active member of club or organization: Not on file     Attends meetings of clubs or organizations: Not on file     Relationship status: Not on file     Intimate partner violence     Fear of current or ex partner: Not on file     Emotionally abused: Not on file     Physically abused: Not on file     Forced sexual activity: Not on file   Other Topics Concern     Not on file   Social History Narrative     Not on file       Active Ambulatory Problems     Diagnosis Date Noted     Allergic Rhinitis      Asthma      Endometriosis      Major Depression, Recurrent      Anxiety      Tension-type Headache      Abnormal Blood Chemistry      Torticollis      Neck Pain      Palpitations      Precordial pain      Cervical radiculopathy  11/24/2014     Abnormal weight loss 01/22/2015     S/P partial hysterectomy 03/09/2015     Pelvic pain 02/13/2018     Left ovarian cyst 02/13/2018     Resolved Ambulatory Problems     Diagnosis Date Noted     External Hemorrhoids      Lump In / On The Skin      Upper Respiratory Infection      Hematemesis 03/09/2015     Asthma with acute exacerbation 05/15/2010     Past Medical History:   Diagnosis Date     Allergic Rhinitis      Anxiety      Arthritis      Cervical disc displacement      Depression      Endometriosis      External hemorrhoid      Groin cyst      Heart palpitations      History of anesthesia complications      Major Depression, Recurrent      Mild Persistent Asthma      PONV (postoperative nausea and vomiting)        Family History   Problem Relation Age of Onset     No Medical Problems Mother      Lung cancer Father      Stroke Father      Cancer Maternal Grandmother      Cancer Maternal Grandfather      Diabetes Paternal Grandmother        Objective:     BP 98/60 (Patient Site: Right Arm, Patient Position: Sitting, Cuff Size: Adult Regular)   Pulse 78   Wt 121 lb 14.4 oz (55.3 kg)   BMI 20.92 kg/m      Patient is alert, in no obvious distress.   Skin: Warm, dry.    Lungs:  Clear to auscultation. Respirations even and unlabored.  No wheezing or rales noted.   Heart:  Regular rate and rhythm.  No murmurs  Musculoskeletal: She has nail polish on her toenails.  She has thickening of both large toenails and her right 5th toenail.

## 2021-06-26 NOTE — PROGRESS NOTES
Progress Notes by Neal Taylor PA-C at 2018 11:20 AM     Author: Neal Taylor PA-C Service: -- Author Type: Physician Assistant    Filed: 2018  9:40 AM Encounter Date: 2018 Status: Signed    : Neal Taylor PA-C (Physician Assistant)         Assessment:       Bacterial vaginosis.  Drug induced constipation.      Plan:       1. Oral antibiotics per orders.  2. Fluids.  3. Continue to use miralax as needed while patient adjusts medications to improve constipation symptoms.  4. Discussed signs of worsening symptoms and when to follow-up with PCP if no symptom improvement.       Patient Instructions   Patient Education     Bacterial Vaginosis    You have a vaginal infection called bacterial vaginosis (BV). Both good and bad bacteria are present in a healthy vagina. BV occurs when these bacteria get out of balance. The number of bad bacteria increase. And the number of good bacteria decrease. Although BV is associated with sexual activity, it is not a sexually transmitted disease.  BV may or may not cause symptoms. If symptoms do occur, they can include:    Thin, gray, milky-white, or sometimes green discharge    Unpleasant odor or fishy smell    Itching, burning, or pain in or around the vagina  It is not known what causes BV, but certain factors can make the problem more likely. This can include:    Douching    Having sex with a new partner    Having sex with more than one partner  BV will sometimes go away on its own. But treatment is usually recommended. This is because untreated BV can increase the risk of more serious health problems such as:    Pelvic inflammatory disease (PID)     delivery (giving birth to a baby early if youre pregnant)    HIV and certain other sexually transmitted diseases (STDs)    Infection after surgery on the reproductive organs  Home care  General care    BV is most often treated with medicines called antibiotics. These may be given as pills  or as a vaginal cream. If antibiotics are prescribed, be sure to use them exactly as directed. Also, be sure to complete all of the medicine, even if your symptoms go away.    Don't douche or having sex during treatment.    If you have sex with a female partner, ask your healthcare provider if she should also be treated.  Prevention    Don't douche.    Don't have sex. If you do have sex, then take steps to lower your risk:  ? Use condoms when having sex.  ? Limit the number of sexual partners you have.  Follow-up care  Follow up with your healthcare provider, or as advised.  When to seek medical advice  Call your healthcare provider right away if:    You have a fever of 100.4 F (38 C) or higher, or as directed by your provider.    Your symptoms worsen, or they dont go away within a few days of starting treatment.    You have new pain in the lower belly or pelvic region.    You have side effects that bother you or a reaction to the pills or cream youre prescribed.    You or any partners you have sex with have new symptoms, such as a rash, joint pain, or sores.  Date Last Reviewed: 10/1/2017    1049-7486 [employer]. [address] [place] [address]. All rights reserved. This information is not intended as a substitute for professional medical care. Always follow your healthcare professional's instructions.             Subjective:       Vicki Pierson is a 33 y.o. female here for evaluation of vaginal discharge and abdominal pain. Onset of discharge was 2 days ago. Patient has had similar symptoms in the past and has been treated for bacterial vaginosis frequently. The discharge is white in color with odor. Patient denies itching, pain, rash, lesions, nausea, vomiting, fevers, urinary frequency, hematuria, and back pain. Patient is not concerned for STD's and denies lab testing for STD's today. Reasoning is that she has been with the same partner. Abdominal pain started 10 days ago after  starting glycopyrrolate recommended by her dermatologist. Patient did not have a bowel movement for 1 week. She then tried milk of magnesia with no relief. Then tried miralax and was able to have a bowel movement with complete relief of hr abdominal pain. She has contacted her dermatologist and they are adjusting the medication appropriately.     The following portions of the patient's history were reviewed and updated as appropriate: allergies, current medications and problem list.    Review of Systems  Pertinent items are noted in HPI.     Allergies  Allergies   Allergen Reactions   ? Tramadol Hives   ? Venom-Honey Bee Anaphylaxis   ? Venlafaxine Anxiety and Other (See Comments)         Objective:       /70 (Patient Site: Right Arm, Patient Position: Sitting, Cuff Size: Adult Regular)   Pulse 81   Temp 98.4  F (36.9  C)   Resp 19   Wt 123 lb (55.8 kg)   SpO2 98%   Breastfeeding? No   BMI 21.11 kg/m    General appearance: alert, appears stated age, cooperative, no distress and non-toxic  Pelvic: cervix normal in appearance, external genitalia normal and vagina mucosa appears normal with white discharge noted     Lab Results    Recent Results (from the past 24 hour(s))   Wet Prep, Vaginal   Result Value Ref Range    Yeast Result No yeast seen No yeast seen    Trichomonas No Trichomonas seen No Trichomonas seen    Clue Cells, Wet Prep Clue cells seen (!) No Clue cells seen     I personally reviewed these results and discussed findings with the patient.

## 2021-06-27 NOTE — PROGRESS NOTES
Progress Notes by Cheli Marina MD at 7/11/2019  1:00 PM     Author: Cheli Marina MD Service: -- Author Type: Physician    Filed: 7/11/2019  3:35 PM Encounter Date: 7/11/2019 Status: Signed    : Cheli Marina MD (Physician)         Subjective:   Vicki Pierson is a 33 y.o. female  Roomed by: Lindsay MALHOTRA CMA    Refills needed? No    Do you have any forms that need to be filled out? No      Chief Complaint   Patient presents with   ? Nausea     x2d, can't get into to GI clinic until 7/15, out of zofran   Patient seeing GI for nausea and getting upper endoscopy and colonoscopy for next week. Ran out of Zofran about a week ago. Says she has an appetite, but gets nauseated with eating. Says her primary started a work up and then referred her to GI. Admits being able to drink and urinate normally. Denies any recent vomiting but admits some upper mid belly pain, or diarrhea. Has tried zantac and prilosec without any improvement. Says was 124 a month ago.     Review of Systems  See HPI for ROS, otherwise balance of other systems negative    Allergies   Allergen Reactions   ? Tramadol Hives   ? Venom-Honey Bee Anaphylaxis   ? Venlafaxine Anxiety and Other (See Comments)       Current Outpatient Medications:   ?  cyclobenzaprine (FLEXERIL) 5 MG tablet, Take 1-2 tablets (5-10 mg total) by mouth 3 (three) times a day as needed., Disp: 30 tablet, Rfl: 0  ?  ondansetron (ZOFRAN) 4 MG tablet, Take 1 tablet (4 mg total) by mouth every 12 (twelve) hours as needed for nausea., Disp: 20 tablet, Rfl: 0  Patient Active Problem List   Diagnosis   ? Allergic Rhinitis   ? Asthma   ? Endometriosis   ? Major Depression, Recurrent   ? Anxiety   ? Tension-type Headache   ? Abnormal Blood Chemistry   ? Torticollis   ? Neck Pain   ? Cervical radiculopathy   ? Abnormal weight loss   ? S/P partial hysterectomy   ? Pelvic pain   ? Left ovarian cyst     Past Medical History:   Diagnosis Date   ? Allergic Rhinitis     Created by  Conversion    ? Anxiety     Created by Conversion    ? Arthritis    ? Asthma    ? Cervical disc displacement    ? Depression    ? Endometriosis    ? External hemorrhoid    ? Groin cyst    ? Heart palpitations     wore holter monitor for 24 hours   ? History of anesthesia complications     itchy in recovery   ? Major Depression, Recurrent     Created by Conversion    ? Mild Persistent Asthma     Created by Conversion    ? PONV (postoperative nausea and vomiting)     - if none on file, see Problem List    Objective:     Vitals:    07/11/19 1300   BP: 112/73   Patient Site: Right Arm   Patient Position: Sitting   Cuff Size: Adult Regular   Pulse: 76   Resp: 16   Temp: 98.4  F (36.9  C)   TempSrc: Oral   SpO2: 97%   Weight: 118 lb 6 oz (53.7 kg)   Gen - Pt in NAD  Cor - RRR w/o murmur  Lungs - Good air entry, no wheezes or crackles noted  Abdomen: Flat, soft, slightly hyperactive BS, no HSM or masses, no rebound or guarding    Assessment - Plan   Medical Decision Making -33-year-old woman presents requesting refill of her ondansetron for nausea.  Patient is a middle of a work-up for nausea, some upper epigastric gastric pain and weight loss.  The patient's exam was negative except for some slightly hyperactive bowel sounds.  Patient already has appointments with GI, primary care and colorectal surgery next week.    1. Dyspepsia  - ondansetron (ZOFRAN-ODT) 4 MG disintegrating tablet; Take 1-2 tablets (4-8 mg total) by mouth every 8 (eight) hours as needed for nausea (or after vomiting).  Dispense: 20 tablet; Refill: 0    2. Nausea    At the conclusion of the encounter, assessment and plan were discussed.   All questions were answered.   The patient or guardian acknowledged understanding and was involved in the decision making regarding the overall care plan.    Patient Instructions   1. Keep well hydrated  2. May alternate Tylenol every 6 hours with ibuprofen every 6 hours as needed for fever or pain  3. Reschedule your  follow up appointment for next week   4. If you have any questions, call the clinic number  - it's answered 24/7    Patient Education     Epigastric Pain (Uncertain Cause)     Epigastric pain can be a sign of disease in the upper abdomen. Common causes include:    Acid reflux (stomach acid flowing up into the esophagus)    Gastritis (irritation of the stomach lining)    Peptic Ulcer Disease    Inflammation of the pancreas    Gallstone    Infection in the gallbladder  Pain may be dull or burning. It may spread upward to the chest or to the back. There may be other symptoms such as belching, bloating, cramps or hunger pains. There may be weight loss or poor appetite, nausea or vomiting.  Since the diagnosis of your pain is not certain yet, further tests may sometimes be needed. Sometimes the doctor will treat you for the most likely condition to see if there is improvement before doing further tests.  Home care  Medicines    Antacids help neutralize the normal acids in your stomach. Examples are Maalox, Mylanta, Rolaids, and Tums. If you dont like the liquid, you can also try a chewable one. You may find one works better than another for you. Overuse can cause diarrhea or constipation.    Acid blockers (H2 blockers) decrease acid production. Examples are cimetidine (Tagamet), famotidine (Pepcid) and ranitidine (Zantac).    Acid inhibitors (PPIs) decrease acid production in a different way than the blockers. You may find they work better, but can take a little longer to take effect.  Examples are omeprazole (Prilosec), lansoprazole (Prevacid), pantoprazole (Protonix), rabeprazole (Aciphex), and esomeprazole (Nexium).    Take an antacid 30-60 minutes after eating and at bedtime, but not at the same time as an acid blocker.    Try not to take NSAIDs. Aspirin may also cause problems, but if taking it for your heart or other medical reasons, talk to your doctor before stopping it; you do not want to cause a worse problem,  like a heart attack or stroke.  Diet    If certain foods seem to cause your spasm, try to avoid them.     Eat slowly and chew food well before swallowing. Symptoms of gastritis can be worsened by certain foods. Limit or avoid fatty, fried, and spicy foods, as well as coffee, chocolate, mint, and foods with high acid content such as tomatoes and citrus fruit and juices (orange, grapefruit, lemon).    Avoid alcohol, caffeine, and tobacco, which can delay healing and worsen your problem.    Try eating smaller meals with snacks in between  Follow-up care  Follow up with your healthcare provider or as advised.  When to seek medical advice  Call your healthcare provider right away if any of the following occur:    Stomach pain worsens or moves to the right lower part of the abdomen    Chest pain appears, or if it worsens or spreads to the chest, back, neck, shoulder, or arm    Frequent vomiting (cant keep down liquids)    Blood in the stool or vomit (red or black color)    Feeling weak or dizzy, fainting, or having trouble breathing    Fever of 100.4 F (38 C) or higher, or as directed by your healthcare provider    Abdominal swelling  Date Last Reviewed: 9/25/2015 2000-2017 [employer]. [address] [place] [address], [place] [address]. All rights reserved. This information is not intended as a substitute for professional medical care. Always follow your healthcare professional's instructions.

## 2021-06-29 NOTE — PROGRESS NOTES
"Progress Notes by Burak Dillon MD (Ted) at 4/27/2020  1:50 PM     Author: Burak Dillon MD (Ted) Service: -- Author Type: Physician    Filed: 4/27/2020  2:25 PM Encounter Date: 4/27/2020 Status: Signed    : Burak Dillon MD (Ted) (Physician)           The patient has been notified of following:     \"This video visit will be conducted via a call between you and your physician/provider. We have found that certain health care needs can be provided without the need for an in-person physical exam.  This service lets us provide the care you need with a video conversation.  If a prescription is necessary we can send it directly to your pharmacy.  If lab work is needed we can place an order for that and you can then stop by our lab to have the test done at a later time.      Patient has given verbal consent to a Video visit? Yes    HEART CARE VIDEO ENCOUNTER        The patient has chosen to have the visit conducted as a video visit, to reduce risk of exposure given the current status of Coronavirus in our community. This video visit is being conducted via a call between the patient and physician/provider. Health care needs are being provided without a physical exam.     Assessment/Recommendations   Assessment/Plan:  Palpitations, uncertain etiology possibly related to PVCs  Syncope, uncertain mechanism  Chest pain, atypical  History of anxiety and depression      Stress echo to rule out exercise-induced arrhythmias and structural heart abnormalities  Holter to assess frequency of arrhythmias including PVCs  Office visit to perform cardiovascular physical exam and discuss the results of tests/device treatment if necessary.  It is possible that her symptoms are related to increased anxiety surrounding COVID-19 pandemic.  I do not want to be presumptuous and potentially miss significant heart rhythm abnormalities.      Follow Up Plan: Week  I have reviewed the note as " documented.  This accurately captures the substance of my conversation with the patient.    Total time of video between patient and provider was 30 minutes   Start Time: 1:50 PM  Stop Time: 2:20 PM    Originating Location (pt. Location): Home    Distant Location (provider location):  [facility]     Mode of Communication:  Video Conference via [email or web address]       History of Present Illness/Subjective    Vicki Pierson is a 34 y.o. female who is being evaluated via a billable video visit and has consented to a video visit.   She reports heart palpitations.  She described palpitations above heart racing and skipping.  She has had the symptoms for over a month now.  She had one episode of loss of consciousness.  This was preceded by irregular heart beating.  Syncope occurred after she resumes upright position.  She has had mild nonexertional chest pains on and off.  She cannot identify any precipitating factors.  She does not relate her palpitations any physical activities.  She gets similar heart palpitations 6 years ago.  She feels the palpitations are more frequent now.  She is not known to have coronary artery disease, valvular heart disease, cardiomyopathy.  She denies PND, orthopnea, pedal edema.  She is concerned about her family disease history.  Her father had cardiac event recently.      I have reviewed and updated the patient's Past Medical History, Social History, Family History and Medication List.     Physical Examination performed via live video encounter Review of Systems   General Appearance:   no distress, normal body habitus, upright.   ENT/Mouth: membranes moist, no nasal discharge or bleeding gums.  Normal head shape, no evidence of injury or laceration.     EYES:  no scleral icterus, normal conjunctivae   Neck: no evidence of thyromegaly.  Supple   Chest/Lungs:   No audible wheezing equal chest wall expansion. Non labored breathing.  No cough.   Cardiovascular:   No evidence of elevated  jugular venous pressure.  No evidence of pitting edema bilaterally    Abdomen:  no evidence of abdominal distention. No observe juandice.     Extremities: no cyanosis or clubbing noted.    Skin: no xanthelasma, normal skin collar. No evidence of facial lacerations.      Neurologic: Normal arm motion bilateral, no tremors.  No evidence of focal defect.       Psychiatric: alert and oriented x3, calm                                               Medical History  Surgical History Family History Social History   Past Medical History:   Diagnosis Date   ? Allergic Rhinitis     Created by Conversion    ? Anxiety     Created by Conversion    ? Arthritis    ? Asthma    ? Cervical disc displacement    ? Depression    ? Endometriosis    ? External hemorrhoid    ? Groin cyst    ? Heart palpitations     wore holter monitor for 24 hours   ? History of anesthesia complications     itchy in recovery   ? Major Depression, Recurrent     Created by Conversion    ? Mild Persistent Asthma     Created by Conversion    ? PONV (postoperative nausea and vomiting)     Past Surgical History:   Procedure Laterality Date   ? CERVICAL FUSION N/A 11/24/2014    Procedure: Anterior Cervical Decompression Fusion Cervical 6 through Cervical 7 Bilateral;  Surgeon: Joselito Wolfe MD;  Location: [facility];  Service:    ? CERVICAL FUSION Bilateral    ? CYST REMOVAL      [place]   ? HEMORROIDECTOMY N/A 7/19/2018    Procedure: HEMORRHOIDECTOMY;  Surgeon: Anette Klein MD;  Location: [facility];  Service:    ? HYSTERECTOMY     ? OOPHORECTOMY Left    ? NM LAP,DIAGNOSTIC ABDOMEN Left 2/13/2018    Procedure: LAPAROSCOPY LEFT  OOPHORECTOMY;  Surgeon: Letty Floyd DO;  Location: [facility];  Service: Gynecology   ? NM LIGATE FALLOPIAN TUBE      Description: Tubal Ligation;  Recorded: 11/21/2014;   ? NM TOTAL ABDOM HYSTERECTOMY      Description: Hysterectomy;  Recorded: 11/21/2014;   ? TUBAL LIGATION      Family History    Problem Relation Age of Onset   ? No Medical Problems Mother    ? Lung cancer Father    ? Stroke Father    ? Cancer Maternal Grandmother    ? Cancer Maternal Grandfather    ? Diabetes Paternal Grandmother       Social History     Socioeconomic History   ? Marital status: Single     Spouse name: Not on file   ? Number of children: Not on file   ? Years of education: Not on file   ? Highest education level: Not on file   Occupational History   ? Not on file   Social Needs   ? Financial resource strain: Not on file   ? Food insecurity     Worry: Not on file     Inability: Not on file   ? Transportation needs     Medical: Not on file     Non-medical: Not on file   Tobacco Use   ? Smoking status: Former Smoker   ? Smokeless tobacco: Former User     Quit date: 9/2/2003   ? Tobacco comment: quit smoking 14 yrs ago   Substance and Sexual Activity   ? Alcohol use: Yes     Alcohol/week: 1.0 standard drinks     Types: 1 Shots of liquor per week     Comment: rare   ? Drug use: Yes     Types: Marijuana     Comment: recreational   ? Sexual activity: Not on file   Lifestyle   ? Physical activity     Days per week: Not on file     Minutes per session: Not on file   ? Stress: Not on file   Relationships   ? Social connections     Talks on phone: Not on file     Gets together: Not on file     Attends Holiness service: Not on file     Active member of club or organization: Not on file     Attends meetings of clubs or organizations: Not on file     Relationship status: Not on file   ? Intimate partner violence     Fear of current or ex partner: Not on file     Emotionally abused: Not on file     Physically abused: Not on file     Forced sexual activity: Not on file   Other Topics Concern   ? Not on file   Social History Narrative   ? Not on file          Medications  Allergies   Current Outpatient Medications   Medication Sig Dispense Refill   ? albuterol (PROAIR HFA;PROVENTIL HFA;VENTOLIN HFA) 90 mcg/actuation inhaler Inhale 2  puffs every 6 (six) hours as needed for wheezing. 1 each 0   ? ondansetron (ZOFRAN-ODT) 4 MG disintegrating tablet Take 1-2 tablets (4-8 mg total) by mouth every 8 (eight) hours as needed for nausea (or after vomiting). 30 tablet 1     No current facility-administered medications for this visit.     Allergies   Allergen Reactions   ? Tramadol Hives   ? Venom-Honey Bee Anaphylaxis   ? Venlafaxine Anxiety and Other (See Comments)         Lab Results    Chemistry/lipid CBC Cardiac Enzymes/BNP/TSH/INR   Lab Results   Component Value Date    CREATININE 0.86 04/23/2020    BUN 16 04/23/2020    K 4.2 04/23/2020     04/23/2020     (H) 04/23/2020    CO2 23 04/23/2020    Lab Results   Component Value Date    WBC 8.3 04/23/2020    HGB 15.9 04/23/2020    HCT 45.6 04/23/2020    MCV 98 04/23/2020     04/23/2020    Lab Results   Component Value Date    TSH 0.91 04/23/2020    INR 1.05 02/12/2016        Burak Dillon (Ted)

## 2021-06-29 NOTE — PROGRESS NOTES
Progress Notes by Twan Wen CNP at 10/28/2020 11:50 AM     Author: Twan Wen CNP Service: -- Author Type: Nurse Practitioner    Filed: 10/28/2020 12:51 PM Encounter Date: 10/28/2020 Status: Signed    : Twan Wen CNP (Nurse Practitioner)       Chief Complaint   Patient presents with   ? Vaginal odor     x2d, poss BV       HPI:  Vicki Pierson is a 34 y.o. female who presents today complaining of 2 days of vaginal odor, without discharge. Patient reports a past history of recurrent BV. Reports no concerns of STDs, same male partner.     History obtained from the patient.  LMP: post hysterectomy    Problem List:  2018-02: Pelvic pain  2018-02: Left ovarian cyst  2015-03: S/P partial hysterectomy  2015-03: Hematemesis  2015-01: Abnormal weight loss  2014-11: Cervical radiculopathy  2010-05: Asthma with acute exacerbation  External Hemorrhoids  Allergic Rhinitis  Asthma  Endometriosis  Major Depression, Recurrent  Anxiety  Lump In / On The Skin  Tension-type Headache  Abnormal Blood Chemistry  Torticollis  Upper Respiratory Infection  Neck Pain  Palpitations  Precordial pain      Past Medical History:   Diagnosis Date   ? Allergic Rhinitis     Created by Conversion    ? Anxiety     Created by Conversion    ? Arthritis    ? Asthma    ? Cervical disc displacement    ? Depression    ? Endometriosis    ? External hemorrhoid    ? Groin cyst    ? Heart palpitations     wore holter monitor for 24 hours   ? History of anesthesia complications     itchy in recovery   ? Major Depression, Recurrent     Created by Conversion    ? Mild Persistent Asthma     Created by Conversion    ? PONV (postoperative nausea and vomiting)        Social History     Tobacco Use   ? Smoking status: Former Smoker   ? Smokeless tobacco: Former User     Quit date: 9/2/2003   ? Tobacco comment: quit smoking 14 yrs ago   Substance Use Topics   ? Alcohol use: Yes     Alcohol/week: 1.0 standard drinks     Types: 1 Shots of  liquor per week     Comment: rare       Review of Systems   Constitutional: Negative for activity change, appetite change, chills, fatigue and fever.   Gastrointestinal: Negative for abdominal pain.   Genitourinary: Negative for dyspareunia, dysuria, flank pain, vaginal bleeding and vaginal discharge.   All other systems reviewed and are negative.      Vitals:    10/28/20 1146   BP: 117/76   Patient Site: Right Arm   Patient Position: Sitting   Cuff Size: Adult Regular   Pulse: 84   Resp: 16   Temp: 98.3  F (36.8  C)   TempSrc: Oral   SpO2: 100%       Physical Exam  Constitutional:       Appearance: Normal appearance. She is not ill-appearing or diaphoretic.   Cardiovascular:      Rate and Rhythm: Normal rate and regular rhythm.      Pulses: Normal pulses.      Heart sounds: Normal heart sounds.   Pulmonary:      Effort: Pulmonary effort is normal.      Breath sounds: Normal breath sounds.   Skin:     General: Skin is warm.      Capillary Refill: Capillary refill takes less than 2 seconds.      Coloration: Skin is not pale.      Findings: No erythema or rash.   Neurological:      General: No focal deficit present.      Mental Status: She is alert and oriented to person, place, and time. Mental status is at baseline.   Psychiatric:         Mood and Affect: Mood normal.         Behavior: Behavior normal.         No notes on file    Labs:  Recent Results (from the past 72 hour(s))   Wet Prep, Vaginal    Specimen: Genital   Result Value Ref Range    Yeast Result No yeast seen No yeast seen    Trichomonas No Trichomonas seen No Trichomonas seen    Clue Cells, Wet Prep Clue cells seen (!) No Clue cells seen       Radiology: None obtained      Clinical Decision Making: At the end of the encounter, I discussed results, diagnosis, medications. Discussed with patient positive wet prep findings, will treat with clindamycin given previous history of resistance with Flagyl. Reviewed indications for follow up if no improvement.  Patient understood and agreed to plan.    ANTON Heller, CNP       1. BV (bacterial vaginosis)  clindamycin (CLEOCIN) 300 MG capsule   2. Vaginal odor  Wet Prep, Vaginal         Patient Instructions     Patient Education     Bacterial Vaginosis    You have a vaginal infection called bacterial vaginosis (BV). Both good and bad bacteria are present in a healthy vagina. BV occurs when these bacteria get out of balance. The number of bad bacteria increase. And the number of good bacteria decrease. Although BV is associated with sexual activity, it is not a sexually transmitted disease.  BV may or may not cause symptoms. If symptoms do occur, they can include:    Thin, gray, milky-white, or sometimes green discharge    Unpleasant odor or fishy smell    Itching, burning, or pain in or around the vagina  It is not known what causes BV, but certain factors can make the problem more likely. This can include:    Douching    Having sex with a new partner    Having sex with more than one partner  BV will sometimes go away on its own. But treatment is usually recommended. This is because untreated BV can increase the risk of more serious health problems such as:    Pelvic inflammatory disease (PID)     delivery (giving birth to a baby early if youre pregnant)    HIV and certain other sexually transmitted diseases (STDs)    Infection after surgery on the reproductive organs  Home care  General care    BV is most often treated with medicines called antibiotics. These may be given as pills or as a vaginal cream. If antibiotics are prescribed, be sure to use them exactly as directed. Also, be sure to complete all of the medicine, even if your symptoms go away.    Don't douche or having sex during treatment.    If you have sex with a female partner, ask your healthcare provider if she should also be treated.  Prevention    Don't douche.    Don't have sex. If you do have sex, then take steps to lower your risk:  ? Use  condoms when having sex.  ? Limit the number of sexual partners you have.  Follow-up care  Follow up with your healthcare provider, or as advised.  When to seek medical advice  Call your healthcare provider right away if:    You have a fever of 100.4 F (38 C) or higher, or as directed by your provider.    Your symptoms worsen, or they dont go away within a few days of starting treatment.    You have new pain in the lower belly or pelvic region.    You have side effects that bother you or a reaction to the pills or cream youre prescribed.    You or any partners you have sex with have new symptoms, such as a rash, joint pain, or sores.  Date Last Reviewed: 10/1/2017    2592-5111 [employer]. [address] [place] [address], [place] [address]. All rights reserved. This information is not intended as a substitute for professional medical care. Always follow your healthcare professional's instructions.

## 2021-07-04 NOTE — TELEPHONE ENCOUNTER
Telephone Encounter by Quita Scott at 6/2/2021 10:20 AM     Author: Quita Scott Service: -- Author Type: --    Filed: 6/2/2021 11:04 AM Encounter Date: 5/28/2021 Status: Addendum    : Quita Scott    Related Notes: Original Note by Quita Scott filed at 6/2/2021 11:03 AM       No PA needed, medication is covered under plan.  Per call to insurance pharmacy is trying to process a NDC that does not participate in the National Drug Rebate program through the state PMAP plans.  Called pharmacy to make them aware.     Per pharmacy the NDC or  that is covered is not a NDC that they carry at the pharmacy.  Pharmacy will not be able to order in the NDC that is covered by plan.  Pharmacist suggests patient fill this medication at another pharmacy that will be able to order in the  that is covered by plan.      No futher action can be taken by PA team, this medication is covered.

## 2021-07-06 VITALS
BODY MASS INDEX: 20.92 KG/M2 | WEIGHT: 121.9 LBS | HEART RATE: 78 BPM | SYSTOLIC BLOOD PRESSURE: 98 MMHG | DIASTOLIC BLOOD PRESSURE: 60 MMHG

## 2021-07-06 ASSESSMENT — PATIENT HEALTH QUESTIONNAIRE - PHQ9: SUM OF ALL RESPONSES TO PHQ QUESTIONS 1-9: 5

## 2021-07-08 ASSESSMENT — ASTHMA QUESTIONNAIRES: ACT_TOTALSCORE: 24

## 2021-08-06 NOTE — PROGRESS NOTES
Chief Complaint   Patient presents with     Vaginal Discharge     Admit odor, white discharge. started this AM        History of Present Illness: Nursing notes reviewed. Patient has an appointment with her OB/GYN for an exam. She would prefer not to have an exam today. She has characteristic symptoms of bacterial vaginosis which she has had in the past.     Review of systems: See history of present illness, otherwise negative.     Current Outpatient Prescriptions   Medication Sig Dispense Refill     clindamycin (CLEOCIN) 300 MG capsule Take one tablet by mouth twice daily for 7 days. 14 capsule 0     clindamycin (CLEOCIN) 300 MG capsule Take 1 capsule (300 mg total) by mouth 2 (two) times a day for 7 days. 14 capsule 0     cyclobenzaprine (FLEXERIL) 5 MG tablet Take 1-2 tablets (5-10 mg total) by mouth 3 (three) times a day as needed. 30 tablet 0     hydrOXYzine (ATARAX) 25 MG tablet Take 1-2 tablets by mouth at bedtime as needed for sleep 60 tablet 0     ibuprofen (ADVIL,MOTRIN) 200 MG tablet Take 200 mg by mouth every 6 (six) hours as needed for pain.       naproxen (NAPROSYN) 500 MG tablet Take 1 tablet (500 mg total) by mouth 2 (two) times a day with meals. 30 tablet 0     ondansetron (ZOFRAN, AS HYDROCHLORIDE,) 4 MG tablet Take 1 tablet (4 mg total) by mouth daily as needed for nausea. 30 tablet 1     No current facility-administered medications for this visit.        Past Medical History:   Diagnosis Date     Allergic Rhinitis     Created by Conversion      Anxiety     Created by Conversion      Asthma      Cervical disc displacement      Depression      Endometriosis      External hemorrhoid      Groin cyst      Heart palpitations     wore holter monitor for 24 hours     Major Depression, Recurrent     Created by Conversion      Mild Persistent Asthma     Created by Conversion       Past Surgical History:   Procedure Laterality Date     CERVICAL FUSION N/A 11/24/2014    Procedure: Anterior Cervical  Decompression Fusion Cervical 6 through Cervical 7 Bilateral;  Surgeon: Joselito Wolfe MD;  Location: [facility] OR;  Service:      CYST REMOVAL      [place]     HYSTERECTOMY       [place] LIGATE FALLOPIAN TUBE      Description: Tubal Ligation;  Recorded: 11/21/2014;     [place] TOTAL ABDOM HYSTERECTOMY      Description: Hysterectomy;  Recorded: 11/21/2014;     TUBAL LIGATION        Social History     Social History     Marital status: Single     Spouse name: N/A     Number of children: N/A     Years of education: N/A     Social History Main Topics     Smoking status: Former Smoker     Smokeless tobacco: Former User     Quit date: 9/2/2003      Comment: quit smoking 11 1/2 yrs ago     Alcohol use No     Drug use: No     Sexual activity: Not Asked     Other Topics Concern     None     Social History Narrative       History   Smoking Status     Former Smoker   Smokeless Tobacco     Former User     Quit date: 9/2/2003     Comment: quit smoking 11 1/2 yrs ago      Exam:   Blood pressure 110/80, pulse 60, temperature 98.6  F (37  C), temperature source Oral, resp. rate 12, weight 116 lb (52.6 kg), SpO2 98 %, not currently breastfeeding.    EXAM:   General: Vital signs reviewed. Patient is in no acute appearing distress. Breathing is non labored appearing. Patient is alert and oriented x 3.   No exam done.    Assessment/Plan   1. Bacterial vaginosis  clindamycin (CLEOCIN) 300 MG capsule       There are no Patient Instructions on file for this visit.   Jarrell Lloyd DO

## 2021-08-17 ENCOUNTER — OFFICE VISIT (OUTPATIENT)
Dept: FAMILY MEDICINE | Facility: CLINIC | Age: 36
End: 2021-08-17
Payer: COMMERCIAL

## 2021-08-17 VITALS
WEIGHT: 125.5 LBS | OXYGEN SATURATION: 98 % | SYSTOLIC BLOOD PRESSURE: 106 MMHG | DIASTOLIC BLOOD PRESSURE: 68 MMHG | HEART RATE: 89 BPM | BODY MASS INDEX: 21.54 KG/M2

## 2021-08-17 DIAGNOSIS — F41.1 ANXIETY STATE: ICD-10-CM

## 2021-08-17 DIAGNOSIS — J45.20 MILD INTERMITTENT ASTHMA WITHOUT COMPLICATION: ICD-10-CM

## 2021-08-17 DIAGNOSIS — R11.0 NAUSEA: ICD-10-CM

## 2021-08-17 DIAGNOSIS — G89.29 CHRONIC RIGHT SHOULDER PAIN: ICD-10-CM

## 2021-08-17 DIAGNOSIS — F33.0 MILD EPISODE OF RECURRENT MAJOR DEPRESSIVE DISORDER (H): ICD-10-CM

## 2021-08-17 DIAGNOSIS — Z01.818 PRE-OP EXAM: Primary | ICD-10-CM

## 2021-08-17 DIAGNOSIS — M25.511 CHRONIC RIGHT SHOULDER PAIN: ICD-10-CM

## 2021-08-17 PROBLEM — M43.6 TORTICOLLIS: Status: ACTIVE | Noted: 2019-03-26

## 2021-08-17 PROBLEM — N80.9 ENDOMETRIOSIS: Status: ACTIVE | Noted: 2018-05-28

## 2021-08-17 PROBLEM — R79.9 ABNORMAL BLOOD CHEMISTRY: Status: ACTIVE | Noted: 2019-03-26

## 2021-08-17 PROBLEM — R07.2 PRECORDIAL PAIN: Status: ACTIVE | Noted: 2020-09-16

## 2021-08-17 PROBLEM — F33.9 MAJOR DEPRESSION, RECURRENT (H): Status: ACTIVE | Noted: 2018-05-28

## 2021-08-17 PROBLEM — M24.611 ARTHROFIBROSIS OF RIGHT SHOULDER: Status: ACTIVE | Noted: 2021-06-30

## 2021-08-17 PROBLEM — R00.2 PALPITATIONS: Status: ACTIVE | Noted: 2020-09-25

## 2021-08-17 PROBLEM — J45.909 ASTHMA: Status: ACTIVE | Noted: 2018-05-28

## 2021-08-17 PROBLEM — S43.431A TEAR OF RIGHT GLENOID LABRUM: Status: ACTIVE | Noted: 2020-12-14

## 2021-08-17 PROBLEM — M54.2 NECK PAIN: Status: ACTIVE | Noted: 2019-03-26

## 2021-08-17 PROBLEM — G44.209 TENSION-TYPE HEADACHE: Status: ACTIVE | Noted: 2018-05-28

## 2021-08-17 PROBLEM — J30.9 ALLERGIC RHINITIS: Status: ACTIVE | Noted: 2018-05-28

## 2021-08-17 LAB — HGB BLD-MCNC: 14.2 G/DL (ref 11.7–15.7)

## 2021-08-17 PROCEDURE — 99214 OFFICE O/P EST MOD 30 MIN: CPT | Performed by: NURSE PRACTITIONER

## 2021-08-17 PROCEDURE — 85018 HEMOGLOBIN: CPT | Performed by: NURSE PRACTITIONER

## 2021-08-17 PROCEDURE — 36415 COLL VENOUS BLD VENIPUNCTURE: CPT | Performed by: NURSE PRACTITIONER

## 2021-08-17 RX ORDER — ONDANSETRON 4 MG/1
4 TABLET, ORALLY DISINTEGRATING ORAL
COMMUNITY
Start: 2019-10-24 | End: 2021-08-17

## 2021-08-17 RX ORDER — ONDANSETRON 4 MG/1
4 TABLET, ORALLY DISINTEGRATING ORAL EVERY 12 HOURS PRN
Qty: 30 TABLET | Refills: 0 | Status: SHIPPED | OUTPATIENT
Start: 2021-08-17 | End: 2021-09-16

## 2021-08-17 RX ORDER — IBUPROFEN 800 MG/1
TABLET, FILM COATED ORAL
Status: ON HOLD | COMMUNITY
Start: 2021-07-13 | End: 2024-07-08

## 2021-08-17 RX ORDER — SENNOSIDES A AND B 8.6 MG/1
1 TABLET, FILM COATED ORAL
COMMUNITY
Start: 2020-12-21 | End: 2022-05-17

## 2021-08-17 RX ORDER — OXYCODONE AND ACETAMINOPHEN 5; 325 MG/1; MG/1
1 TABLET ORAL
COMMUNITY
Start: 2021-02-11 | End: 2022-04-07

## 2021-08-17 RX ORDER — NALOXONE HYDROCHLORIDE 4 MG/.1ML
SPRAY NASAL
COMMUNITY
Start: 2021-01-11 | End: 2023-04-25

## 2021-08-17 RX ORDER — ALBUTEROL SULFATE 90 UG/1
2 AEROSOL, METERED RESPIRATORY (INHALATION)
COMMUNITY
Start: 2019-10-24 | End: 2023-04-25

## 2021-08-17 ASSESSMENT — ANXIETY QUESTIONNAIRES
5. BEING SO RESTLESS THAT IT IS HARD TO SIT STILL: NOT AT ALL
7. FEELING AFRAID AS IF SOMETHING AWFUL MIGHT HAPPEN: NOT AT ALL
8. IF YOU CHECKED OFF ANY PROBLEMS, HOW DIFFICULT HAVE THESE MADE IT FOR YOU TO DO YOUR WORK, TAKE CARE OF THINGS AT HOME, OR GET ALONG WITH OTHER PEOPLE?: NOT DIFFICULT AT ALL
GAD7 TOTAL SCORE: 0
4. TROUBLE RELAXING: NOT AT ALL
GAD7 TOTAL SCORE: 0
2. NOT BEING ABLE TO STOP OR CONTROL WORRYING: NOT AT ALL
3. WORRYING TOO MUCH ABOUT DIFFERENT THINGS: NOT AT ALL
GAD7 TOTAL SCORE: 0
6. BECOMING EASILY ANNOYED OR IRRITABLE: NOT AT ALL
7. FEELING AFRAID AS IF SOMETHING AWFUL MIGHT HAPPEN: NOT AT ALL
1. FEELING NERVOUS, ANXIOUS, OR ON EDGE: NOT AT ALL

## 2021-08-17 ASSESSMENT — PATIENT HEALTH QUESTIONNAIRE - PHQ9
SUM OF ALL RESPONSES TO PHQ QUESTIONS 1-9: 0
10. IF YOU CHECKED OFF ANY PROBLEMS, HOW DIFFICULT HAVE THESE PROBLEMS MADE IT FOR YOU TO DO YOUR WORK, TAKE CARE OF THINGS AT HOME, OR GET ALONG WITH OTHER PEOPLE: NOT DIFFICULT AT ALL
SUM OF ALL RESPONSES TO PHQ QUESTIONS 1-9: 0

## 2021-08-17 NOTE — PROGRESS NOTES
[facility]  [address]  [facility] [id number] for HPI/ROS submitted by the patient on 8/17/2021  If you checked off any problems, how difficult have these problems made it for you to do your work, take care of things at home, or get along with other people?: Not difficult at all  PHQ9 TOTAL SCORE: 0  SUSAN 7 TOTAL SCORE: 0      Phone: [phone]  Fax: [phone]  Primary Provider: Sophie Gibson        PREOPERATIVE EVALUATION:  Today's date: 8/17/2021    Vicki Pierson is a 35 year old female who presents for a preoperative evaluation.    Surgical Information:  Surgery/Procedure: Right shoulder manipulation  Surgery Location: [facility]  Surgeon: Dr. Dumont  Surgery Date: 8/20/21  Time of Surgery: TBD   Where patient plans to recover: At home with family  Fax number for surgical facility: Will obtain     Type of Anesthesia Anticipated: to be determined    Assessment & Plan     The proposed surgical procedure is considered INTERMEDIATE risk.    Pre-op exam  Preop exam performed.  There are no contraindications to surgery.  She will hold NSAIDs for 7 days prior to surgery.  Covid test ordered per surgeon.  - Hemoglobin  - Hemoglobin    Chronic right shoulder pain  She continues Percocet and cyclobenzaprine.    Nausea  Provided prescription for Zofran to take as needed.  - ondansetron (ZOFRAN-ODT) 4 MG ODT tab  Dispense: 30 tablet; Refill: 0    Mild intermittent asthma without complication  This is controlled.  She continues albuterol as needed.    Mild episode of recurrent major depressive disorder (H)  Anxiety state  Mood is stable.  She is not currently taking medication.           RECOMMENDATION:  APPROVAL GIVEN to proceed with proposed procedure, without further diagnostic evaluation.          Subjective     HPI related to upcoming procedure: Patient had a baltazar shoulder repair December 2020.  Since then, she has been experiencing constant stiffness and  pain within her right shoulder.  Patient has limited range of motion and cannot fix her hair or lift her left arm above her head.  She complains of a constant ache.  She has been taking Percocet and Cyclobenzaprine as needed for pain.  She feels as though nothing improves the pain.  She takes Zofran as needed for nausea due to the pain.      He has a history of intermittent asthma and denies any recent flares.  She has a history of depression and anxiety which is stable without medication.  Patient receives Botox injections for hyperhidrosis.    Preop Questions 8/17/2021   1. Have you ever had a heart attack or stroke? No   2. Have you ever had surgery on your heart or blood vessels, such as a stent placement, a coronary artery bypass, or surgery on an artery in your head, neck, heart, or legs? No   3. Do you have chest pain with activity? No   4. Do you have a history of  heart failure? No   5. Do you currently have a cold, bronchitis or symptoms of other infection? No   6. Do you have a cough, shortness of breath, or wheezing? No   7. Do you or anyone in your family have previous history of blood clots? No   8. Do you or does anyone in your family have a serious bleeding problem such as prolonged bleeding following surgeries or cuts? No   9. Have you ever had problems with anemia or been told to take iron pills? No   10. Have you had any abnormal blood loss such as black, tarry or bloody stools, or abnormal vaginal bleeding? No   11. Have you ever had a blood transfusion? No   12. Are you willing to have a blood transfusion if it is medically needed before, during, or after your surgery? Yes   13. Have you or any of your relatives ever had problems with anesthesia? No   14. Do you have sleep apnea, excessive snoring or daytime drowsiness? No   15. Do you have any artifical heart valves or other implanted medical devices like a pacemaker, defibrillator, or continuous glucose monitor? No   16. Do you have artificial  joints? No   17. Are you allergic to latex? No   18. Is there any chance that you may be pregnant? No       Health Care Directive:  Patient does not have a Health Care Directive or Living Will: Discussed advance care planning with patient; information given to patient to review.    Preoperative Review of :   reviewed - controlled substances prescribed by other outside provider(s).      Status of Chronic Conditions:  See problem list for active medical problems.  Problems all longstanding and stable, except as noted/documented.  See ROS for pertinent symptoms related to these conditions.      Review of Systems  Constitutional, neuro, ENT, endocrine, pulmonary, cardiac, gastrointestinal, genitourinary, musculoskeletal, integument and psychiatric systems are negative, except as otherwise noted.    Patient Active Problem List    Diagnosis Date Noted     Arthrofibrosis of right shoulder 06/30/2021     Priority: Medium     Formatting of this note might be different from the original.  Added automatically from request for surgery [id number]       Tear of right glenoid labrum 12/14/2020     Priority: Medium     Formatting of this note might be different from the original.  Added automatically from request for surgery [id number]       Palpitations 09/25/2020     Priority: Medium     Formatting of this note might be different from the original.  Created by Conversion  Formatting of this note might be different from the original.  Created by Conversion       Precordial pain 09/16/2020     Priority: Medium     Created by Conversion    Formatting of this note might be different from the original.  Created by Conversion  Formatting of this note might be different from the original.  Created by Conversion  Created by Conversion       Torticollis 03/26/2019     Priority: Medium     Formatting of this note might be different from the original.  Created by Conversion    Replacement Utility updated for latest IMO load  Formatting of  this note might be different from the original.  Created by Conversion    Replacement Utility updated for latest IMO load  Formatting of this note might be different from the original.  Overview:   Created by Conversion    Replacement Utility updated for latest IMO load  Created by Conversion    Replacement Utility updated for latest IMO load       Neck pain 03/26/2019     Priority: Medium     Formatting of this note might be different from the original.  Created by Conversion  Formatting of this note might be different from the original.  Created by Conversion  Formatting of this note might be different from the original.  Overview:   Created by Conversion  Created by Conversion       Abnormal blood chemistry 03/26/2019     Priority: Medium     Formatting of this note might be different from the original.  Created by Conversion    Replacement Utility updated for latest IMO load  Formatting of this note might be different from the original.  Created by Conversion    Replacement Utility updated for latest IMO load  Formatting of this note might be different from the original.  Overview:   Created by Conversion    Replacement Utility updated for latest IMO load  Created by Conversion    Replacement Utility updated for latest IMO load       Anxiety state 05/28/2018     Priority: Medium     Created by Conversion    Replacement Utility updated for latest IMO load    Formatting of this note might be different from the original.  Created by Conversion    Replacement Utility updated for latest IMO load  Formatting of this note might be different from the original.  Created by Conversion    Replacement Utility updated for latest IMO load  Created by Conversion    Replacement Utility updated for latest IMO load  Formatting of this note might be different from the original.  Overview:   Created by Conversion    Replacement Utility updated for latest IMO load       Endometriosis 05/28/2018     Priority: Medium     Created by  Conversion    Replacement Utility updated for latest IMO load    Formatting of this note might be different from the original.  Created by Conversion    Replacement Utility updated for latest IMO load  Formatting of this note might be different from the original.  Created by Conversion    Replacement Utility updated for latest IMO load  Created by Conversion    Replacement Utility updated for latest IMO load  Formatting of this note might be different from the original.  Overview:   Created by Conversion    Replacement Utility updated for latest IMO load       Major depression, recurrent (H) 05/28/2018     Priority: Medium     Created by Conversion    Replacement Utility updated for latest IMO load    Formatting of this note might be different from the original.  Created by Conversion    Replacement Utility updated for latest IMO load  Formatting of this note might be different from the original.  Created by Conversion    Replacement Utility updated for latest IMO load  Created by Conversion    Replacement Utility updated for latest IMO load  Formatting of this note might be different from the original.  Overview:   Created by Conversion    Replacement Utility updated for latest IMO load       Tension-type headache 05/28/2018     Priority: Medium     Created by Conversion    Replacement Utility updated for latest IMO load    Formatting of this note might be different from the original.  Created by Conversion    Replacement Utility updated for latest IMO load  Formatting of this note might be different from the original.  Created by Conversion    Replacement Utility updated for latest IMO load  Created by Conversion    Replacement Utility updated for latest IMO load  Formatting of this note might be different from the original.  Overview:   Created by Conversion    Replacement Utility updated for latest IMO load       Asthma 05/28/2018     Priority: Medium     Formatting of this note might be different from the  original.  Created by Conversion  Formatting of this note might be different from the original.  Created by Conversion  Created by Conversion  Formatting of this note might be different from the original.  Overview:   Created by Conversion       Allergic rhinitis 05/28/2018     Priority: Medium     Formatting of this note might be different from the original.  Created by Conversion    Replacement Utility updated for latest IMO load  Formatting of this note might be different from the original.  Created by Conversion    Replacement Utility updated for latest IMO load  Created by Conversion    Replacement Utility updated for latest IMO load  Formatting of this note might be different from the original.  Overview:   Created by Conversion    Replacement Utility updated for latest IMO load       Left ovarian cyst 02/13/2018     Priority: Medium     Pelvic pain 02/13/2018     Priority: Medium     S/P partial hysterectomy 03/09/2015     Priority: Medium     August 2012    Formatting of this note might be different from the original.  August 2012  Formatting of this note might be different from the original.  August 2012 August 2012  Formatting of this note might be different from the original.  Overview:   August 2012       Abnormal weight loss 01/22/2015     Priority: Medium     Cervical radiculopathy 11/24/2014     Priority: Medium     Asthma with exacerbation 05/15/2010     Priority: Medium      Past Medical History:   Diagnosis Date     Allergic rhinitis, cause unspecified     Created by Conversion      Anxiety state, unspecified     Created by Conversion      Arthritis      Asthma      Cervical disc displacement      Depression      Depressive disorder      Endometriosis      Endometriosis, site unspecified      External hemorrhoid      Ganglion cyst of left groin      Groin cyst      Head injury      Heart palpitations     wore holter monitor for 24 hours     History of anesthesia complications     itchy in recovery      Major depression      Major depressive disorder, recurrent episode, unspecified     Created by Conversion      Migraines      PONV (postoperative nausea and vomiting)      Unspecified asthma(493.90)     Created by Conversion      Past Surgical History:   Procedure Laterality Date     C LIGATE FALLOPIAN TUBE      Description: Tubal Ligation;  Recorded: 11/21/2014;     C TOTAL ABDOM HYSTERECTOMY      Description: Hysterectomy;  Recorded: 11/21/2014;     CERVICAL FUSION N/A 11/24/2014    Procedure: Anterior Cervical Decompression Fusion Cervical 6 through Cervical 7 Bilateral;  Surgeon: Joselito Wolfe MD;  Location: [facility] [place] OR;  Service:      CYST REMOVAL      [place]     HEMORRHOIDECTOMY EXTERNAL N/A 07/19/2018    Procedure: HEMORRHOIDECTOMY;  Surgeon: Anette Klein MD;  Location: [facility] OR;  Service:      OOPHORECTOMY Left      [place] LAP,DIAGNOSTIC ABDOMEN Left 02/13/2018    Procedure: LAPAROSCOPY LEFT  OOPHORECTOMY;  Surgeon: Letty Floyd DO;  Location: [facility];  Service: Gynecology     SHOULDER SURGERY Right      TUBAL LIGATION       Current Outpatient Medications   Medication Sig Dispense Refill     albuterol (PROAIR HFA/PROVENTIL HFA/VENTOLIN HFA) 108 (90 Base) MCG/ACT inhaler Inhale 2 puffs into the lungs       cyclobenzaprine (FLEXERIL) 5 MG tablet Take 5-10 mg by mouth       ibuprofen (ADVIL/MOTRIN) 800 MG tablet TAKE 1 TABLET BY MOUTH WITH FOOD OR MILK THREE TIMES DAILY AS NEEDED       NARCAN 4 MG/0.1ML nasal spray CALL 911. SPR CONTENTS OF ONE SPRAYER (0.1ML) INTO ONE NOSTRIL. REPEAT IN 2-3 MIN IF SYMPTOMS OF OPIOID EMERGENCY PERSIST, ALTERNATE NOSTRIL       ondansetron (ZOFRAN-ODT) 4 MG ODT tab Take 1 tablet (4 mg) by mouth every 12 hours as needed for nausea 30 tablet 0     oxyCODONE-acetaminophen (PERCOCET) 5-325 MG tablet Take 1 tablet by mouth       senna (SENOKOT) 8.6 MG tablet Take 1 tablet by mouth         Allergies   Allergen Reactions     Bee Venom  Anaphylaxis     Tramadol Hives     Ketorolac      Other reaction(s): *Unknown     Venlafaxine Anxiety and Other (See Comments)     Other reaction(s): Chest Pain        Social History     Tobacco Use     Smoking status: Former Smoker     Smokeless tobacco: Former User     Quit date: 9/2/2003     Tobacco comment: quit smoking 14 yrs ago   Substance Use Topics     Alcohol use: Not Currently     Alcohol/week: 1.0 standard drinks     Family History   Problem Relation Age of Onset     No Known Problems Mother      Lung Cancer Father      Cerebrovascular Disease Father      Brain Hemorrhage Maternal Grandmother      Cancer Maternal Grandmother      Cancer Maternal Grandfather      Brain Hemorrhage Paternal Grandmother      Diabetes Paternal Grandmother      History   Drug Use     Types: Marijuana     Comment: Drug use: recreational         Objective     /68   Pulse 89   Wt 56.9 kg (125 lb 8 oz)   SpO2 98%   BMI 21.54 kg/m      Physical Exam    GENERAL APPEARANCE: healthy, alert and no distress     EYES: EOMI, PERRL     HENT: ear canals and TM's normal and nose and mouth without ulcers or lesions     NECK: no adenopathy, no asymmetry, masses, or scars and thyroid normal to palpation     RESP: lungs clear to auscultation - no rales, rhonchi or wheezes     CV: regular rates and rhythm, normal S1 S2, no S3 or S4 and no murmur, click or rub     ABDOMEN:  soft, nontender, no HSM or masses and bowel sounds normal     MS: limited ROM of the right shoulder is present.      SKIN: no suspicious lesions or rashes     NEURO: Normal strength and tone, sensory exam grossly normal, mentation intact and speech normal     PSYCH: mentation appears normal. and affect normal/bright     LYMPHATICS: No cervical adenopathy    Recent Labs   Lab Test 04/23/20  1032   HGB 15.9         POTASSIUM 4.2   CR 0.86        Diagnostics:  Labs pending at this time.  Results will be reviewed when available.   No EKG required, no history  of coronary heart disease, significant arrhythmia, peripheral arterial disease or other structural heart disease.    Revised Cardiac Risk Index (RCRI):  The patient has the following serious cardiovascular risks for perioperative complications:   - No serious cardiac risks = 0 points     RCRI Interpretation: 0 points: Class I (very low risk - 0.4% complication rate)           Signed Electronically by: ANTON Cheema CNP  Copy of this evaluation report is provided to requesting physician.

## 2021-08-18 ASSESSMENT — ANXIETY QUESTIONNAIRES: GAD7 TOTAL SCORE: 0

## 2021-08-18 ASSESSMENT — PATIENT HEALTH QUESTIONNAIRE - PHQ9: SUM OF ALL RESPONSES TO PHQ QUESTIONS 1-9: 0

## 2021-09-18 ENCOUNTER — HEALTH MAINTENANCE LETTER (OUTPATIENT)
Age: 36
End: 2021-09-18

## 2021-09-28 ENCOUNTER — TRANSFERRED RECORDS (OUTPATIENT)
Dept: HEALTH INFORMATION MANAGEMENT | Facility: CLINIC | Age: 36
End: 2021-09-28

## 2022-01-08 ENCOUNTER — HEALTH MAINTENANCE LETTER (OUTPATIENT)
Age: 37
End: 2022-01-08

## 2022-01-26 ENCOUNTER — TRANSFERRED RECORDS (OUTPATIENT)
Dept: HEALTH INFORMATION MANAGEMENT | Facility: CLINIC | Age: 37
End: 2022-01-26
Payer: COMMERCIAL

## 2022-03-24 ENCOUNTER — TELEPHONE (OUTPATIENT)
Dept: FAMILY MEDICINE | Facility: CLINIC | Age: 37
End: 2022-03-24

## 2022-03-24 ENCOUNTER — VIRTUAL VISIT (OUTPATIENT)
Dept: FAMILY MEDICINE | Facility: CLINIC | Age: 37
End: 2022-03-24
Payer: COMMERCIAL

## 2022-03-24 DIAGNOSIS — R11.0 NAUSEA: ICD-10-CM

## 2022-03-24 DIAGNOSIS — R11.0 NAUSEA: Primary | ICD-10-CM

## 2022-03-24 DIAGNOSIS — G89.29 CHRONIC RIGHT SHOULDER PAIN: ICD-10-CM

## 2022-03-24 DIAGNOSIS — Z96.611 HISTORY OF RIGHT SHOULDER REPLACEMENT: ICD-10-CM

## 2022-03-24 DIAGNOSIS — M25.511 CHRONIC RIGHT SHOULDER PAIN: ICD-10-CM

## 2022-03-24 PROCEDURE — 99213 OFFICE O/P EST LOW 20 MIN: CPT | Mod: GT | Performed by: NURSE PRACTITIONER

## 2022-03-24 RX ORDER — ACETAMINOPHEN 500 MG
500 TABLET ORAL
COMMUNITY
Start: 2022-02-10 | End: 2023-02-15

## 2022-03-24 RX ORDER — ONDANSETRON 4 MG/1
4 TABLET, FILM COATED ORAL EVERY 12 HOURS PRN
Qty: 20 TABLET | Refills: 0 | Status: SHIPPED | OUTPATIENT
Start: 2022-03-24 | End: 2022-03-24

## 2022-03-24 RX ORDER — HYDROXYZINE PAMOATE 25 MG/1
25 CAPSULE ORAL
COMMUNITY
Start: 2022-03-09 | End: 2022-05-17

## 2022-03-24 RX ORDER — ONDANSETRON 4 MG/1
4 TABLET, FILM COATED ORAL EVERY 12 HOURS PRN
Qty: 20 TABLET | Refills: 0 | Status: SHIPPED | OUTPATIENT
Start: 2022-03-24 | End: 2022-06-10

## 2022-03-24 RX ORDER — ONDANSETRON 4 MG/1
4 TABLET, FILM COATED ORAL EVERY 12 HOURS PRN
Qty: 20 TABLET | Refills: 0 | Status: CANCELLED | OUTPATIENT
Start: 2022-03-24

## 2022-03-24 NOTE — PROGRESS NOTES
Vicki is a 36 year old who is being evaluated via a billable video visit.      How would you like to obtain your AVS? MyChart  If the video visit is dropped, the invitation should be resent by: Text to cell phone: [phone]  Will anyone else be joining your video visit? No      Video Start Time: 11:58 AM     Nausea  We will treat her nausea with Zofran as needed.  Educated on its indications and side effects.  - ondansetron (ZOFRAN) 4 MG tablet  Dispense: 20 tablet; Refill: 0    History of right shoulder replacement  Chronic right shoulder pain  Patient continues to see orthopedics for further evaluation rehab of right shoulder replacement.  She continues oxycodone, hydroxyzine, and cyclobenzaprine as prescribed by her surgeon.  Referral placed to Dr. Tommy Dumont at [facility] in [place], [place].  She is content with the plan.  - Orthopedic  Referral      Subjective   Vicki is a 36 year old who presents for the following health issues   Patient presents today requesting a referral.  Patient had a right shoulder replacement on 2/10/2022.  Her orthopedic provider, Dr. Phi Dumont, is moving from [facility] to [facility].  He is planning on working at the [facility].  Considering she is a restricted recipient for insurance, she needs a referral to continue to see him.  Her arm is still in a sling.  She is completing physical therapy.  She complains of a constant ache and has had difficulty sleeping due to the pain.  She is prescribed Percocet, hydroxyzine, and cyclobenzaprine.  She requests prescription for Zofran today due to nausea from the medications.      Review of Systems   Constitutional, HEENT, cardiovascular, pulmonary, gi and gu systems are negative, except as otherwise noted.      Objective    Vitals - Patient Reported  Weight (Patient Reported): 59.9 kg (132 lb)      Vitals:  No vitals were obtained today due to virtual visit.    Physical Exam   GENERAL: Healthy, alert  and no distress  EYES: Eyes grossly normal to inspection.  No discharge or erythema, or obvious scleral/conjunctival abnormalities.  HENT: Normal cephalic/atraumatic.  External ears, nose and mouth without ulcers or lesions.  No nasal drainage visible.  NECK: No asymmetry, visible masses or scars  RESP: No audible wheeze, cough, or visible cyanosis.  No visible retractions or increased work of breathing.    MS: patient's right arm is in a sling.   SKIN: Visible skin clear. No significant rash, abnormal pigmentation or lesions.  PSYCH: Mentation appears normal, affect normal/bright, judgement and insight intact, normal speech and appearance well-groomed.          Video-Visit Details    Type of service:  Video Visit    Video End Time:12:03 PM    Originating Location (pt. Location): Home    Distant Location (provider location):  [facility]     Platform used for Video Visit: [employer]

## 2022-03-24 NOTE — TELEPHONE ENCOUNTER
Pt calling stating that she had a vv today with Sophie. Provider sent medication: zofran over to the wrong pharmacy. Due to pt's health insurance, can only send rx to: [facility] pharmacy in [facility] per pt. Requested pharmacy has been changed.    Pt would like for Sophie to resend medication to new pharmacy above.

## 2022-03-31 ENCOUNTER — E-VISIT (OUTPATIENT)
Dept: FAMILY MEDICINE | Facility: CLINIC | Age: 37
End: 2022-03-31
Payer: COMMERCIAL

## 2022-03-31 DIAGNOSIS — M25.511 CHRONIC RIGHT SHOULDER PAIN: Primary | ICD-10-CM

## 2022-03-31 DIAGNOSIS — G89.29 CHRONIC RIGHT SHOULDER PAIN: Primary | ICD-10-CM

## 2022-03-31 PROCEDURE — 99207 PR NON-BILLABLE SERV PER CHARTING: CPT | Performed by: NURSE PRACTITIONER

## 2022-03-31 NOTE — PATIENT INSTRUCTIONS
Shiv Cohen.  I placed the referral to [facility] Dr. Qureshi.  I did not charge you for the visit.  If you need further assistance, please let me know.

## 2022-04-07 ENCOUNTER — VIRTUAL VISIT (OUTPATIENT)
Dept: FAMILY MEDICINE | Facility: CLINIC | Age: 37
End: 2022-04-07
Payer: COMMERCIAL

## 2022-04-07 DIAGNOSIS — F41.9 ANXIETY: Primary | ICD-10-CM

## 2022-04-07 DIAGNOSIS — G89.29 OTHER CHRONIC PAIN: ICD-10-CM

## 2022-04-07 DIAGNOSIS — F33.1 MODERATE EPISODE OF RECURRENT MAJOR DEPRESSIVE DISORDER (H): ICD-10-CM

## 2022-04-07 PROCEDURE — 99213 OFFICE O/P EST LOW 20 MIN: CPT | Mod: GT | Performed by: NURSE PRACTITIONER

## 2022-04-07 RX ORDER — OXYCODONE HYDROCHLORIDE 5 MG/1
TABLET ORAL
COMMUNITY
Start: 2022-04-04 | End: 2022-09-06

## 2022-04-07 ASSESSMENT — ANXIETY QUESTIONNAIRES
6. BECOMING EASILY ANNOYED OR IRRITABLE: NOT AT ALL
5. BEING SO RESTLESS THAT IT IS HARD TO SIT STILL: NOT AT ALL
IF YOU CHECKED OFF ANY PROBLEMS ON THIS QUESTIONNAIRE, HOW DIFFICULT HAVE THESE PROBLEMS MADE IT FOR YOU TO DO YOUR WORK, TAKE CARE OF THINGS AT HOME, OR GET ALONG WITH OTHER PEOPLE: VERY DIFFICULT
7. FEELING AFRAID AS IF SOMETHING AWFUL MIGHT HAPPEN: NOT AT ALL
GAD7 TOTAL SCORE: 6
2. NOT BEING ABLE TO STOP OR CONTROL WORRYING: NEARLY EVERY DAY
3. WORRYING TOO MUCH ABOUT DIFFERENT THINGS: NOT AT ALL
1. FEELING NERVOUS, ANXIOUS, OR ON EDGE: NEARLY EVERY DAY

## 2022-04-07 ASSESSMENT — PATIENT HEALTH QUESTIONNAIRE - PHQ9
5. POOR APPETITE OR OVEREATING: NOT AT ALL
SUM OF ALL RESPONSES TO PHQ QUESTIONS 1-9: 0

## 2022-04-07 NOTE — PROGRESS NOTES
Vicki is a 36 year old who is being evaluated via a billable video visit.      How would you like to obtain your AVS? MyChart  If the video visit is dropped, the invitation should be resent by: Text to cell phone: [phone]  Will anyone else be joining your video visit? No      Video Start Time: 7:07 AM      ICD-10-CM    1. Anxiety  F41.9 Adult Mental Health  Referral   2. Moderate episode of recurrent major depressive disorder (H)  F33.1 Adult Mental Health  Referral   3. Other chronic pain  G89.29        Discussed treatment options.  She is not interested in taking medication.  We discussed duloxetine as a treatment option for depression and pain if she changes her mind.  Will refer to counseling for further evaluation and support.  She has crisis numbers at home.  She is content with the plan.    Subjective   Vicki is a 36 year old who presents for the following health issues   Patient presents today for concerns of worsening anxiety and depression.  Patient has a history of cervical fusion at C6-C7.  She recently had an MRI showing a disc protrusion with flattening of the cord at C5-C6.  She also had a shoulder replacement 2 months ago and has had difficulty with mobility.  She has frequent headaches and back pain.  She has a tingling sensation within her right arm when she sleeps.  She cannot walk for prolonged distances.  She has been taking oxycodone as needed.  Patient is sad frequently and has been crying.  She denies thoughts of suicide.  She feels supported at home and at work.    Review of Systems   Constitutional, HEENT, cardiovascular, pulmonary, gi and gu systems are negative, except as otherwise noted.      Objective           Vitals:  No vitals were obtained today due to virtual visit.    Physical Exam   GENERAL: Healthy, alert and no distress  EYES: Eyes grossly normal to inspection.  No discharge or erythema, or obvious scleral/conjunctival abnormalities.  HENT: Normal  cephalic/atraumatic.  External ears, nose and mouth without ulcers or lesions.  No nasal drainage visible.  NECK: No asymmetry, visible masses or scars  RESP: No audible wheeze, cough, or visible cyanosis.  No visible retractions or increased work of breathing.    MS: No gross musculoskeletal defects noted.  Normal range of motion.  No visible edema.  SKIN: Visible skin clear. No significant rash, abnormal pigmentation or lesions.  NEURO: Cranial nerves grossly intact.  Mentation and speech appropriate for age.  PSYCH: Mentation appears normal, affect normal/bright, judgement and insight intact, normal speech and appearance well-groomed.          Video-Visit Details    Type of service:  Video Visit    Video End Time:7:14 AM    Originating Location (pt. Location): Home    Distant Location (provider location):  [facility]     Platform used for Video Visit: [employer]

## 2022-04-08 ASSESSMENT — ANXIETY QUESTIONNAIRES: GAD7 TOTAL SCORE: 6

## 2022-04-19 ENCOUNTER — TRANSFERRED RECORDS (OUTPATIENT)
Dept: HEALTH INFORMATION MANAGEMENT | Facility: CLINIC | Age: 37
End: 2022-04-19
Payer: COMMERCIAL

## 2022-04-29 ENCOUNTER — TRANSFERRED RECORDS (OUTPATIENT)
Dept: HEALTH INFORMATION MANAGEMENT | Facility: CLINIC | Age: 37
End: 2022-04-29
Payer: COMMERCIAL

## 2022-05-16 ENCOUNTER — TRANSFERRED RECORDS (OUTPATIENT)
Dept: HEALTH INFORMATION MANAGEMENT | Facility: CLINIC | Age: 37
End: 2022-05-16
Payer: COMMERCIAL

## 2022-05-17 ENCOUNTER — E-VISIT (OUTPATIENT)
Dept: FAMILY MEDICINE | Facility: CLINIC | Age: 37
End: 2022-05-17
Payer: COMMERCIAL

## 2022-05-17 DIAGNOSIS — M25.511 CHRONIC RIGHT SHOULDER PAIN: Primary | ICD-10-CM

## 2022-05-17 DIAGNOSIS — G89.29 CHRONIC RIGHT SHOULDER PAIN: Primary | ICD-10-CM

## 2022-05-17 PROCEDURE — 99207 PR NON-BILLABLE SERV PER CHARTING: CPT | Performed by: NURSE PRACTITIONER

## 2022-05-27 ENCOUNTER — E-VISIT (OUTPATIENT)
Dept: FAMILY MEDICINE | Facility: CLINIC | Age: 37
End: 2022-05-27
Payer: COMMERCIAL

## 2022-05-27 DIAGNOSIS — F33.0 MAJOR DEPRESSIVE DISORDER, RECURRENT EPISODE, MILD (H): Primary | ICD-10-CM

## 2022-05-27 PROCEDURE — 99421 OL DIG E/M SVC 5-10 MIN: CPT | Performed by: NURSE PRACTITIONER

## 2022-05-27 RX ORDER — ESCITALOPRAM OXALATE 10 MG/1
10 TABLET ORAL DAILY
Qty: 30 TABLET | Refills: 1 | Status: SHIPPED | OUTPATIENT
Start: 2022-05-27 | End: 2022-09-06

## 2022-05-27 ASSESSMENT — ANXIETY QUESTIONNAIRES
7. FEELING AFRAID AS IF SOMETHING AWFUL MIGHT HAPPEN: SEVERAL DAYS
GAD7 TOTAL SCORE: 7
6. BECOMING EASILY ANNOYED OR IRRITABLE: SEVERAL DAYS
1. FEELING NERVOUS, ANXIOUS, OR ON EDGE: SEVERAL DAYS
5. BEING SO RESTLESS THAT IT IS HARD TO SIT STILL: SEVERAL DAYS
4. TROUBLE RELAXING: SEVERAL DAYS
8. IF YOU CHECKED OFF ANY PROBLEMS, HOW DIFFICULT HAVE THESE MADE IT FOR YOU TO DO YOUR WORK, TAKE CARE OF THINGS AT HOME, OR GET ALONG WITH OTHER PEOPLE?: EXTREMELY DIFFICULT
7. FEELING AFRAID AS IF SOMETHING AWFUL MIGHT HAPPEN: SEVERAL DAYS
GAD7 TOTAL SCORE: 7
GAD7 TOTAL SCORE: 7
2. NOT BEING ABLE TO STOP OR CONTROL WORRYING: SEVERAL DAYS
3. WORRYING TOO MUCH ABOUT DIFFERENT THINGS: SEVERAL DAYS

## 2022-05-27 ASSESSMENT — PATIENT HEALTH QUESTIONNAIRE - PHQ9
10. IF YOU CHECKED OFF ANY PROBLEMS, HOW DIFFICULT HAVE THESE PROBLEMS MADE IT FOR YOU TO DO YOUR WORK, TAKE CARE OF THINGS AT HOME, OR GET ALONG WITH OTHER PEOPLE: EXTREMELY DIFFICULT
SUM OF ALL RESPONSES TO PHQ QUESTIONS 1-9: 7
SUM OF ALL RESPONSES TO PHQ QUESTIONS 1-9: 7

## 2022-05-27 NOTE — PATIENT INSTRUCTIONS
Depression: Tips to Help Yourself    As your healthcare providers help treat your depression, you can also help yourself. Keep in mind that your illness affects you emotionally, physically, mentally, and socially. So full recovery will take time. Take care of your body and your soul, and be patient with yourself as you get better.  Self-care    Educate yourself. Read about treatment and medicine options. If you have the energy, attend local conferences or support groups. Keep a list of useful websites and helpful books and use them as needed. This illness is not your fault. Don t blame yourself for your depression.    Manage early symptoms. If you notice symptoms returning, experience triggers, or identify other factors that may lead to a depressive episode, get help as soon as possible. Ask trusted friends and family to monitor your behavior and let you know if they see anything of concern.    Work with your provider. Find a provider you can trust. Communicate honestly with that person and share information on your treatment for depression and your reaction to medicines.    Be prepared for a crisis. Know what to do if you experience a crisis. Keep the phone number of a crisis hotline and know the location of your community's urgent care centers and the closest emergency department.    Hold off on big decisions. Depression can cloud your judgment. So wait until you feel better before making major life decisions, such as changing jobs, moving, or getting  or .    Be patient. Recovering from depression is a process. Don t be discouraged if it takes some time to feel better.    Keep it simple. Depression saps your energy and concentration. So you won t be able to do all the things you used to do. Set small goals and do what you can.    Be with others. Don t isolate yourself--you ll only feel worse. Try to be with other people. And take part in fun activities when you can. Go to a movie, ballgame,  Congregation service, or social event. Talk openly with people you can trust. And accept help when it s offered.    Take care of your body  People with depression often lose the desire to take care of themselves. That only makes their problems worse. During treatment and afterward, make a point to:    Exercise. It s a great way to take care of your body. And studies have shown that exercise helps fight depression. Aim for 30 minutes of moderate activity a day. Walking in small blocks of time (5-10 minutes) is a good way to start, but anything that gets you moving (gardening, house cleaning) counts.    Don't use drugs and alcohol. These may ease the pain in the short term. But they ll only make your problems worse in the long run.    Get relief from stress. Ask your healthcare provider for relaxation exercises and techniques to help relieve stress. Consider activities like meditation, yoga, or Mic Chi.    Eat right. A balanced and healthy diet helps keep your body healthy.    Get adequate sleep. Aim for 8 hours per night. Too much or too little sleep can cause other physical and emotional problems.  [employer] last reviewed this educational content on 12/1/2019 2000-2021 [employer]. All rights reserved. This information is not intended as a substitute for professional medical care. Always follow your healthcare professional's instructions.

## 2022-05-28 ASSESSMENT — ANXIETY QUESTIONNAIRES: GAD7 TOTAL SCORE: 7

## 2022-05-28 ASSESSMENT — PATIENT HEALTH QUESTIONNAIRE - PHQ9: SUM OF ALL RESPONSES TO PHQ QUESTIONS 1-9: 7

## 2022-06-10 ENCOUNTER — TRANSFERRED RECORDS (OUTPATIENT)
Dept: HEALTH INFORMATION MANAGEMENT | Facility: CLINIC | Age: 37
End: 2022-06-10
Payer: COMMERCIAL

## 2022-06-10 DIAGNOSIS — R11.0 NAUSEA: ICD-10-CM

## 2022-06-10 RX ORDER — ONDANSETRON 4 MG/1
TABLET, FILM COATED ORAL
Qty: 20 TABLET | Refills: 4 | Status: SHIPPED | OUTPATIENT
Start: 2022-06-10 | End: 2022-09-08

## 2022-06-11 NOTE — TELEPHONE ENCOUNTER
"Last Written Prescription Date:  3/24/22  Last Fill Quantity: 20,  # refills: 0   Last office visit provider:  4/7/22     Requested Prescriptions   Pending Prescriptions Disp Refills     ondansetron (ZOFRAN) 4 MG tablet [Pharmacy Med Name: ONDANSETRON 4MG TABLETS] 20 tablet 0     Sig: TAKE 1 TABLET(4 MG) BY MOUTH EVERY 12 HOURS AS NEEDED FOR NAUSEA        Antivertigo/Antiemetic Agents Passed - 6/10/2022 11:58 AM        Passed - Recent (12 mo) or future (30 days) visit within the authorizing provider's specialty     Patient has had an office visit with the authorizing provider or a provider within the authorizing providers department within the previous 12 mos or has a future within next 30 days. See \"Patient Info\" tab in inbasket, or \"Choose Columns\" in Meds & Orders section of the refill encounter.              Passed - Medication is active on med list        Passed - Patient is 18 years of age or older             Laverne Covarrubias RN 06/10/22 9:15 PM  "

## 2022-06-21 ENCOUNTER — E-VISIT (OUTPATIENT)
Dept: FAMILY MEDICINE | Facility: CLINIC | Age: 37
End: 2022-06-21
Payer: COMMERCIAL

## 2022-06-21 DIAGNOSIS — R11.0 NAUSEA: Primary | ICD-10-CM

## 2022-06-21 PROCEDURE — 99421 OL DIG E/M SVC 5-10 MIN: CPT | Performed by: NURSE PRACTITIONER

## 2022-06-21 RX ORDER — ONDANSETRON 4 MG/1
4 TABLET, FILM COATED ORAL EVERY 12 HOURS PRN
Qty: 20 TABLET | Refills: 0 | Status: SHIPPED | OUTPATIENT
Start: 2022-06-21 | End: 2022-09-08

## 2022-06-21 NOTE — PATIENT INSTRUCTIONS
Thank you for choosing us for your care. I have placed an order for a prescription so that you can start treatment. View your full visit summary for details by clicking on the link below. Your pharmacist will able to address any questions you may have about the medication.     If you're not feeling better within 5-7 days, please schedule an appointment.  You can schedule an appointment right here in [facility], or call [phone]  If the visit is for the same symptoms as your eVisit, we'll refund the cost of your eVisit if seen within seven days.

## 2022-06-23 ENCOUNTER — TRANSFERRED RECORDS (OUTPATIENT)
Dept: HEALTH INFORMATION MANAGEMENT | Facility: CLINIC | Age: 37
End: 2022-06-23

## 2022-07-05 ENCOUNTER — TRANSFERRED RECORDS (OUTPATIENT)
Dept: HEALTH INFORMATION MANAGEMENT | Facility: CLINIC | Age: 37
End: 2022-07-05

## 2022-07-22 ENCOUNTER — TRANSFERRED RECORDS (OUTPATIENT)
Dept: HEALTH INFORMATION MANAGEMENT | Facility: CLINIC | Age: 37
End: 2022-07-22

## 2022-07-25 ENCOUNTER — TRANSFERRED RECORDS (OUTPATIENT)
Dept: HEALTH INFORMATION MANAGEMENT | Facility: CLINIC | Age: 37
End: 2022-07-25

## 2022-07-28 ENCOUNTER — E-VISIT (OUTPATIENT)
Dept: FAMILY MEDICINE | Facility: CLINIC | Age: 37
End: 2022-07-28
Payer: COMMERCIAL

## 2022-07-28 DIAGNOSIS — G89.29 CHRONIC RIGHT SHOULDER PAIN: Primary | ICD-10-CM

## 2022-07-28 DIAGNOSIS — M25.511 CHRONIC RIGHT SHOULDER PAIN: Primary | ICD-10-CM

## 2022-07-28 PROCEDURE — 99207 PR NON-BILLABLE SERV PER CHARTING: CPT | Performed by: NURSE PRACTITIONER

## 2022-07-29 DIAGNOSIS — G89.29 CHRONIC RIGHT SHOULDER PAIN: Primary | ICD-10-CM

## 2022-07-29 DIAGNOSIS — M25.511 CHRONIC RIGHT SHOULDER PAIN: Primary | ICD-10-CM

## 2022-08-03 NOTE — PATIENT INSTRUCTIONS
I placed a referral to Dr. Colin Mathew at [facility].    You can schedule an appointment right here in [facility], or call [phone]

## 2022-08-15 ENCOUNTER — TRANSFERRED RECORDS (OUTPATIENT)
Dept: HEALTH INFORMATION MANAGEMENT | Facility: CLINIC | Age: 37
End: 2022-08-15

## 2022-08-16 ENCOUNTER — E-VISIT (OUTPATIENT)
Dept: URGENT CARE | Facility: CLINIC | Age: 37
End: 2022-08-16

## 2022-08-16 ENCOUNTER — E-VISIT (OUTPATIENT)
Dept: FAMILY MEDICINE | Facility: CLINIC | Age: 37
End: 2022-08-16
Payer: COMMERCIAL

## 2022-08-16 DIAGNOSIS — A08.4 VIRAL GASTROENTERITIS: Primary | ICD-10-CM

## 2022-08-16 DIAGNOSIS — Z53.9 ERRONEOUS ENCOUNTER--DISREGARD: Primary | ICD-10-CM

## 2022-08-16 PROCEDURE — 99421 OL DIG E/M SVC 5-10 MIN: CPT | Performed by: NURSE PRACTITIONER

## 2022-08-16 NOTE — PATIENT INSTRUCTIONS
Thank you for choosing us for your care. Based on your symptoms and length of illness, I do not think that you need a prescription at this time.  Please follow the care advise I've provided and use the over the counter medications to help relieve your symptoms. Please focus on re-hydrating with plenty of fluids.  If you develop abdominal pain or a fever develops, you need to be seen in person.   View your full visit summary for details by clicking on the link below.     If you're not feeling better within 2-3 days, please respond to this message and we can consider if a prescription is needed.  You can schedule an appointment right here in [facility], or call [phone]  If the visit is for the same symptoms as your eVisit, we'll refund the cost of your eVisit if seen within seven days.

## 2022-08-18 ENCOUNTER — TRANSFERRED RECORDS (OUTPATIENT)
Dept: HEALTH INFORMATION MANAGEMENT | Facility: CLINIC | Age: 37
End: 2022-08-18

## 2022-08-26 ENCOUNTER — E-VISIT (OUTPATIENT)
Dept: FAMILY MEDICINE | Facility: CLINIC | Age: 37
End: 2022-08-26
Payer: COMMERCIAL

## 2022-08-26 DIAGNOSIS — M24.611 ARTHROFIBROSIS OF RIGHT SHOULDER: Primary | ICD-10-CM

## 2022-08-26 PROCEDURE — 99207 PR NON-BILLABLE SERV PER CHARTING: CPT | Performed by: NURSE PRACTITIONER

## 2022-08-26 NOTE — TELEPHONE ENCOUNTER
Please call [facility] to find out what type of MRI he wants.  Is it with or without contrast?  How does he want the contrast injected?  Thanks.

## 2022-08-31 ENCOUNTER — TRANSFERRED RECORDS (OUTPATIENT)
Dept: HEALTH INFORMATION MANAGEMENT | Facility: CLINIC | Age: 37
End: 2022-08-31

## 2022-09-06 ENCOUNTER — OFFICE VISIT (OUTPATIENT)
Dept: FAMILY MEDICINE | Facility: CLINIC | Age: 37
End: 2022-09-06
Payer: COMMERCIAL

## 2022-09-06 ENCOUNTER — E-VISIT (OUTPATIENT)
Dept: FAMILY MEDICINE | Facility: CLINIC | Age: 37
End: 2022-09-06

## 2022-09-06 VITALS
SYSTOLIC BLOOD PRESSURE: 110 MMHG | OXYGEN SATURATION: 98 % | HEART RATE: 88 BPM | BODY MASS INDEX: 21.46 KG/M2 | DIASTOLIC BLOOD PRESSURE: 60 MMHG | WEIGHT: 125 LBS | RESPIRATION RATE: 20 BRPM

## 2022-09-06 DIAGNOSIS — M25.511 CHRONIC RIGHT SHOULDER PAIN: Primary | ICD-10-CM

## 2022-09-06 DIAGNOSIS — G89.29 CHRONIC RIGHT SHOULDER PAIN: Primary | ICD-10-CM

## 2022-09-06 DIAGNOSIS — F11.20 UNCOMPLICATED OPIOID DEPENDENCE (H): ICD-10-CM

## 2022-09-06 DIAGNOSIS — R30.0 DYSURIA: ICD-10-CM

## 2022-09-06 DIAGNOSIS — N39.0 ACUTE UTI: ICD-10-CM

## 2022-09-06 LAB
ALBUMIN UR-MCNC: NEGATIVE MG/DL
APPEARANCE UR: CLEAR
BACTERIA #/AREA URNS HPF: ABNORMAL /HPF
BILIRUB UR QL STRIP: NEGATIVE
CLUE CELLS: ABNORMAL
COLOR UR AUTO: YELLOW
GLUCOSE UR STRIP-MCNC: NEGATIVE MG/DL
HGB UR QL STRIP: ABNORMAL
KETONES UR STRIP-MCNC: 15 MG/DL
LEUKOCYTE ESTERASE UR QL STRIP: ABNORMAL
NITRATE UR QL: POSITIVE
PH UR STRIP: 6 [PH] (ref 5–8)
RBC #/AREA URNS AUTO: ABNORMAL /HPF
SP GR UR STRIP: 1.01 (ref 1–1.03)
SQUAMOUS #/AREA URNS AUTO: ABNORMAL /LPF
TRICHOMONAS, WET PREP: ABNORMAL
UROBILINOGEN UR STRIP-ACNC: 0.2 E.U./DL
WBC #/AREA URNS AUTO: ABNORMAL /HPF
WBC'S/HIGH POWER FIELD, WET PREP: ABNORMAL
YEAST, WET PREP: ABNORMAL

## 2022-09-06 PROCEDURE — 87086 URINE CULTURE/COLONY COUNT: CPT | Performed by: NURSE PRACTITIONER

## 2022-09-06 PROCEDURE — 99214 OFFICE O/P EST MOD 30 MIN: CPT | Performed by: NURSE PRACTITIONER

## 2022-09-06 PROCEDURE — 81001 URINALYSIS AUTO W/SCOPE: CPT | Performed by: NURSE PRACTITIONER

## 2022-09-06 PROCEDURE — 99207 PR NON-BILLABLE SERV PER CHARTING: CPT | Performed by: NURSE PRACTITIONER

## 2022-09-06 PROCEDURE — 87210 SMEAR WET MOUNT SALINE/INK: CPT | Performed by: NURSE PRACTITIONER

## 2022-09-06 PROCEDURE — 87186 SC STD MICRODIL/AGAR DIL: CPT | Performed by: NURSE PRACTITIONER

## 2022-09-06 RX ORDER — ONDANSETRON 4 MG/1
TABLET, ORALLY DISINTEGRATING ORAL
COMMUNITY
Start: 2022-02-10 | End: 2023-02-15

## 2022-09-06 RX ORDER — NITROFURANTOIN 25; 75 MG/1; MG/1
100 CAPSULE ORAL 2 TIMES DAILY
Qty: 10 CAPSULE | Refills: 0 | Status: SHIPPED | OUTPATIENT
Start: 2022-09-06 | End: 2022-09-11

## 2022-09-06 RX ORDER — OXYCODONE AND ACETAMINOPHEN 5; 325 MG/1; MG/1
TABLET ORAL
COMMUNITY
Start: 2022-08-22 | End: 2022-11-18

## 2022-09-06 RX ORDER — CYCLOBENZAPRINE HCL 10 MG
TABLET ORAL
COMMUNITY
Start: 2022-05-20 | End: 2022-09-06

## 2022-09-06 ASSESSMENT — ASTHMA QUESTIONNAIRES
QUESTION_5 LAST FOUR WEEKS HOW WOULD YOU RATE YOUR ASTHMA CONTROL: COMPLETELY CONTROLLED
ACT_TOTALSCORE: 25
QUESTION_2 LAST FOUR WEEKS HOW OFTEN HAVE YOU HAD SHORTNESS OF BREATH: NOT AT ALL
QUESTION_4 LAST FOUR WEEKS HOW OFTEN HAVE YOU USED YOUR RESCUE INHALER OR NEBULIZER MEDICATION (SUCH AS ALBUTEROL): NOT AT ALL
QUESTION_3 LAST FOUR WEEKS HOW OFTEN DID YOUR ASTHMA SYMPTOMS (WHEEZING, COUGHING, SHORTNESS OF BREATH, CHEST TIGHTNESS OR PAIN) WAKE YOU UP AT NIGHT OR EARLIER THAN USUAL IN THE MORNING: NOT AT ALL
QUESTION_1 LAST FOUR WEEKS HOW MUCH OF THE TIME DID YOUR ASTHMA KEEP YOU FROM GETTING AS MUCH DONE AT WORK, SCHOOL OR AT HOME: NONE OF THE TIME
ACT_TOTALSCORE: 25

## 2022-09-06 ASSESSMENT — PATIENT HEALTH QUESTIONNAIRE - PHQ9
SUM OF ALL RESPONSES TO PHQ QUESTIONS 1-9: 2
10. IF YOU CHECKED OFF ANY PROBLEMS, HOW DIFFICULT HAVE THESE PROBLEMS MADE IT FOR YOU TO DO YOUR WORK, TAKE CARE OF THINGS AT HOME, OR GET ALONG WITH OTHER PEOPLE: SOMEWHAT DIFFICULT
SUM OF ALL RESPONSES TO PHQ QUESTIONS 1-9: 2

## 2022-09-06 NOTE — PROGRESS NOTES
Assessment and Plan:     Chronic right shoulder pain  CT arthrogram ordered per surgeon's request.  Patient continues to see the pain clinic.  She is prescribed Percocet.  Discussed symptomatic treatment cleaning rest, ice, stretching activities.  - XR Shoulder Contrast CT/MR Injection Right    Uncomplicated opioid dependence (H)  Patient continues to see the pain clinic.    Acute UTI  Urinalysis is suspicious for urinary tract infection.  We will treat with Macrobid.  Educated on its indications and side effects.  Discussed symptomatic treatment and methods of preventing urinary tract infections in the future.  She is content with the plan.  - nitroFURantoin macrocrystal-monohydrate (MACROBID) 100 MG capsule  Dispense: 10 capsule; Refill: 0    Dysuria  - UA Macro with Reflex to Micro and Culture - lab collect  - Wet prep - Clinic Collect  - UA Macro with Reflex to Micro and Culture - lab collect  - Urine Microscopic Exam  - Urine Culture        Subjective:     Vicki is a 36 year old female presenting to the clinic for concerns for chronic right shoulder pain.  Patient is s/p right TSA.  Patient had been following up with her original surgeon, but did not feel as though she was finding a solution for her pain.  Patient consulted with Dr. Colin Mathew from [facility].  Patient complains of a constant ache which is exacerbated with movement.  She has significant limitations to her range of motion.  She is unable to perform some ADLs and requires assistance with dressing.  She does not have the strength to lift her arm.  Patient does continue to work as an MA.  Patient is restricted recipient and requires the CT arthrogram from me.  Patient is seen in pain clinic where she is receiving Percocet.  Patient is also concerned for a possible urinary tract infection.  Over the past 2 to 3 weeks, she has been experiencing dysuria and urinary odor.  She complains of urinary frequency and urgency.  She is urinating  small amounts.  She denies vaginal discharge or irritation.  She has no concerns for STDs.    Reviewof Systems: A complete 14 point review of systems was obtained and is negative or as stated in the history of present illness.    Social History     Socioeconomic History     Marital status: Single     Spouse name: Not on file     Number of children: Not on file     Years of education: Not on file     Highest education level: Not on file   Occupational History     Not on file   Tobacco Use     Smoking status: Former Smoker     Smokeless tobacco: Former User     Quit date: 9/2/2003     Tobacco comment: quit smoking 14 yrs ago   Vaping Use     Vaping Use: Never used   Substance and Sexual Activity     Alcohol use: Not Currently     Alcohol/week: 1.0 standard drink     Drug use: Yes     Types: Marijuana     Comment: Drug use: recreational     Sexual activity: Not on file   Other Topics Concern     Parent/sibling w/ CABG, MI or angioplasty before 65F 55M? Not Asked   Social History Narrative     Not on file     Social Determinants of Health     Financial Resource Strain: Not on file   Food Insecurity: Not on file   Transportation Needs: Not on file   Physical Activity: Not on file   Stress: Not on file   Social Connections: Not on file   Intimate Partner Violence: Not on file   Housing Stability: Not on file       Active Ambulatory Problems     Diagnosis Date Noted     Anxiety state 05/28/2018     Cervical radiculopathy 11/24/2014     Endometriosis 05/28/2018     Left ovarian cyst 02/13/2018     Major depression, recurrent (H) 05/28/2018     Pelvic pain 02/13/2018     Precordial pain 09/16/2020     Tension-type headache 05/28/2018     S/P partial hysterectomy 03/09/2015     Asthma with exacerbation 05/15/2010     Torticollis 03/26/2019     Tear of right glenoid labrum 12/14/2020     Palpitations 09/25/2020     Neck pain 03/26/2019     Asthma 05/28/2018     Arthrofibrosis of right shoulder 06/30/2021     Allergic rhinitis  05/28/2018     Abnormal weight loss 01/22/2015     Abnormal blood chemistry 03/26/2019     Resolved Ambulatory Problems     Diagnosis Date Noted     Hematemesis 03/09/2015     Past Medical History:   Diagnosis Date     Allergic rhinitis, cause unspecified      Anxiety state, unspecified      Arthritis      Cervical disc displacement      Depression      Depressive disorder      Endometriosis, site unspecified      External hemorrhoid      Ganglion cyst of left groin      Groin cyst      Head injury      Heart palpitations      History of anesthesia complications      Major depression      Major depressive disorder, recurrent episode, unspecified      Migraines      PONV (postoperative nausea and vomiting)      Unspecified asthma(493.90)        Family History   Problem Relation Age of Onset     No Known Problems Mother      Lung Cancer Father      Cerebrovascular Disease Father      Brain Hemorrhage Maternal Grandmother      Cancer Maternal Grandmother      Cancer Maternal Grandfather      Brain Hemorrhage Paternal Grandmother      Diabetes Paternal Grandmother        Objective:     /60 (BP Location: Right arm, Patient Position: Sitting, Cuff Size: Adult Regular)   Pulse 88   Resp 20   Wt 56.7 kg (125 lb)   SpO2 98%   BMI 21.46 kg/m      Patient is alert, in no obvious distress.   Skin: Warm, dry.    Lungs:  Clear to auscultation. Respirations even and unlabored.  No wheezing or rales noted.   Heart:  Regular rate and rhythm.  No murmurs, S3, S4, gallops, or rubs.    Abdomen: Soft, nontender.  No organomegaly. Bowel sounds normoactive. No guarding or masses noted.   Musculoskeletal:  She has limited ROM of her right shoulder.  She is able to abduct approximately 40 degrees.  She has difficulty externally rotating her shoulder.       LABORATORY: Urinalysis ordered showing small leukocytes, positive nitrates, small blood.            Answers for HPI/ROS submitted by the patient on 9/6/2022  If you checked off  any problems, how difficult have these problems made it for you to do your work, take care of things at home, or get along with other people?: Somewhat difficult  PHQ9 TOTAL SCORE: 2  How many servings of fruits and vegetables do you eat daily?: 2-3  On average, how many sweetened beverages do you drink each day (Examples: soda, juice, sweet tea, etc.  Do NOT count diet or artificially sweetened beverages)?: 3  How many minutes a day do you exercise enough to make your heart beat faster?: 30 to 60  How many days a week do you exercise enough to make your heart beat faster?: 7  How many days per week do you miss taking your medication?: 0  What is the reason for your visit today?: Possible bladder infection or UTI  When did your symptoms begin?: 1-2 weeks ago  What are your symptoms?: Urge to use the bathroom but don t go much, urine has a strong smell and it never has done that before  How would you describe these symptoms?: Moderate  Are your symptoms:: Staying the same  Have you had these symptoms before?: No  Is there anything that makes you feel worse?: Having to use the bathroom  Is there anything that makes you feel better?: No

## 2022-09-06 NOTE — PATIENT INSTRUCTIONS
Thank you for choosing us for your care. I think an in-clinic visit would be best next steps based on your symptoms. Please schedule a clinic appointment; you won t be charged for this eVisit.      You can schedule an appointment right here in [facility], or call [phone]

## 2022-09-08 ENCOUNTER — MYC MEDICAL ADVICE (OUTPATIENT)
Dept: FAMILY MEDICINE | Facility: CLINIC | Age: 37
End: 2022-09-08

## 2022-09-08 DIAGNOSIS — R11.0 NAUSEA: ICD-10-CM

## 2022-09-08 LAB — BACTERIA UR CULT: ABNORMAL

## 2022-09-08 NOTE — TELEPHONE ENCOUNTER
Pending Prescriptions:                       Disp   Refills    ondansetron (ZOFRAN) 4 MG tablet          20 tab*4            Sig: TAKE 1 TABLET(4 MG) BY MOUTH EVERY 12 HOURS AS           NEEDED FOR NAUSEA

## 2022-09-09 NOTE — TELEPHONE ENCOUNTER
"Routing refill request to provider for review/approval because:  Provider fill for acute dosing.    Last Written Prescription Date:  6/10/22  Last Fill Quantity: 20,  # refills: 4   Last office visit provider:  9/6/22     Requested Prescriptions   Pending Prescriptions Disp Refills     ondansetron (ZOFRAN) 4 MG tablet 20 tablet 4     Sig: TAKE 1 TABLET(4 MG) BY MOUTH EVERY 12 HOURS AS NEEDED FOR NAUSEA        Antivertigo/Antiemetic Agents Passed - 9/8/2022 10:41 AM        Passed - Recent (12 mo) or future (30 days) visit within the authorizing provider's specialty     Patient has had an office visit with the authorizing provider or a provider within the authorizing providers department within the previous 12 mos or has a future within next 30 days. See \"Patient Info\" tab in inbasket, or \"Choose Columns\" in Meds & Orders section of the refill encounter.              Passed - Medication is active on med list        Passed - Patient is 18 years of age or older             Juanjo Davis RN 09/09/22 12:24 PM  "

## 2022-09-11 RX ORDER — ONDANSETRON 4 MG/1
TABLET, FILM COATED ORAL
Qty: 20 TABLET | Refills: 4 | Status: SHIPPED | OUTPATIENT
Start: 2022-09-11 | End: 2022-11-22

## 2022-09-19 ENCOUNTER — E-VISIT (OUTPATIENT)
Dept: FAMILY MEDICINE | Facility: CLINIC | Age: 37
End: 2022-09-19
Payer: COMMERCIAL

## 2022-09-19 DIAGNOSIS — G89.29 CHRONIC RIGHT SHOULDER PAIN: Primary | ICD-10-CM

## 2022-09-19 DIAGNOSIS — M25.511 CHRONIC RIGHT SHOULDER PAIN: Primary | ICD-10-CM

## 2022-09-19 PROCEDURE — 99207 PR NON-BILLABLE SERV PER CHARTING: CPT | Performed by: NURSE PRACTITIONER

## 2022-09-21 NOTE — TELEPHONE ENCOUNTER
I previously placed the CT arthrogram order.  Please fax it to where she needs this submitted.  Thanks.

## 2022-09-22 NOTE — PATIENT INSTRUCTIONS
Thank you for choosing us for your care. I will forward this to my staff to send the CT order to the correct location.  In the future, you can just send me a basic mychart message for these conerns.

## 2022-09-26 DIAGNOSIS — G89.29 CHRONIC RIGHT SHOULDER PAIN: Primary | ICD-10-CM

## 2022-09-26 DIAGNOSIS — M25.511 CHRONIC RIGHT SHOULDER PAIN: Primary | ICD-10-CM

## 2022-10-03 ENCOUNTER — E-VISIT (OUTPATIENT)
Dept: FAMILY MEDICINE | Facility: CLINIC | Age: 37
End: 2022-10-03
Payer: COMMERCIAL

## 2022-10-03 DIAGNOSIS — R11.2 NAUSEA AND VOMITING, UNSPECIFIED VOMITING TYPE: Primary | ICD-10-CM

## 2022-10-03 PROCEDURE — 99421 OL DIG E/M SVC 5-10 MIN: CPT | Performed by: NURSE PRACTITIONER

## 2022-10-03 NOTE — PATIENT INSTRUCTIONS
Thank you for choosing us for your care. It sounds as though you have viral gastroenteritis which is a virus that causes vomiting and diarrhea.  This typically resolves on its own.  It is important to stay well-hydrated. Based on your symptoms and length of illness, I do not think that you need a prescription at this time.  Please follow the care advise I've provided and use the over the counter medications to help relieve your symptoms. If you develop a fever or abdominal pain, I recommend ER evaluation.     View your full visit summary for details by clicking on the link below.     If you're not feeling better within 2-3 days, please respond to this message and we can consider if a prescription is needed.  You can schedule an appointment right here in [facility], or call [phone]  If the visit is for the same symptoms as your eVisit, we'll refund the cost of your eVisit if seen within seven days.      I will send a note via [employer] for your workplace.

## 2022-10-04 DIAGNOSIS — R30.0 DYSURIA: Primary | ICD-10-CM

## 2022-10-04 RX ORDER — CIPROFLOXACIN 500 MG/1
500 TABLET, FILM COATED ORAL 2 TIMES DAILY
Qty: 14 TABLET | Refills: 0 | Status: SHIPPED | OUTPATIENT
Start: 2022-10-04 | End: 2022-10-11

## 2022-10-21 ENCOUNTER — TELEPHONE (OUTPATIENT)
Dept: FAMILY MEDICINE | Facility: CLINIC | Age: 37
End: 2022-10-21

## 2022-10-21 ENCOUNTER — MYC MEDICAL ADVICE (OUTPATIENT)
Dept: FAMILY MEDICINE | Facility: CLINIC | Age: 37
End: 2022-10-21

## 2022-10-21 NOTE — TELEPHONE ENCOUNTER
Called [facility]. Pharmacist states that the Percocet went through insurance. Nothing further needed.

## 2022-10-21 NOTE — TELEPHONE ENCOUNTER
Routed to Sophie Gibson, her pool, and Dr Engle, who is covering for her today to address a needed pre authorization. Sasha Christopher RN on 10/21/2022 at 11:43 AM

## 2022-11-02 ENCOUNTER — TELEPHONE (OUTPATIENT)
Dept: FAMILY MEDICINE | Facility: CLINIC | Age: 37
End: 2022-11-02

## 2022-11-02 NOTE — TELEPHONE ENCOUNTER
[facility] needing a restricted recipient program referral form completed. She states that 3 separate ones needs to be completed and back dated to 10/17.     If there are any additional questions she can be reached at [phone] ext. [phone]    Needs to be submitted to patients insurance company.    Dr. Deven Galvin   NPI : [id number]    Nurse Practitioner Mildred Martinez  NPI : [id number]    [facility]    NPI : [id number]

## 2022-11-03 NOTE — TELEPHONE ENCOUNTER
[facility] called requesting RRP form be filled out for their specific location: [facility]. NPI [id number] Requested that it be sent high priority.    Please fax to [facility].

## 2022-11-07 NOTE — TELEPHONE ENCOUNTER
[facility] RRP form has been completed and faxed over to  [facility].

## 2022-11-07 NOTE — TELEPHONE ENCOUNTER
Tushar calling back states she already received referral for [employer] which is covering both of the providers listed below, she just needs the one the the surgical center now.    Please use Dx G89.29, but does not want it to be for just one procedure code in case more that one visit or procedure is needed in the next year. Would like visits to be as many as we can give or unlimited.   For this visit and the next 2 (which are injections)would be procedure code 00235.   However there may be more to come, whicj is why they are requesting more/unlimited.    The one we sent though from [facility] was for 999 visit.

## 2022-11-07 NOTE — TELEPHONE ENCOUNTER
Asked for call bk from Tushar olvera/[employer] to confirm that Dx's code M19.011 and G89.29 along with procedure code 38326 should be noted on all 3 referral requests to [facility] Recipient Program forms.  If not Tushar will need to provide all Dx's codes and procedure codes to complete forms.    Dr Deven Galvin-   Dx's: M19.011, G89.29  Procedure code: 28814    Mildred Martinez NP  Dx's code: ?  Procedure code: ?    [facility]  Dx's code: ?  Procedure code: ?

## 2022-11-17 ENCOUNTER — TELEPHONE (OUTPATIENT)
Dept: FAMILY MEDICINE | Facility: CLINIC | Age: 37
End: 2022-11-17

## 2022-11-17 NOTE — TELEPHONE ENCOUNTER
[facility] needs a health insurance referral for the facility. They've had to cancel an injection due to not having the referral. They have one for the clinic already.

## 2022-11-18 ENCOUNTER — VIRTUAL VISIT (OUTPATIENT)
Dept: FAMILY MEDICINE | Facility: CLINIC | Age: 37
End: 2022-11-18
Payer: COMMERCIAL

## 2022-11-18 ENCOUNTER — MYC MEDICAL ADVICE (OUTPATIENT)
Dept: FAMILY MEDICINE | Facility: CLINIC | Age: 37
End: 2022-11-18

## 2022-11-18 DIAGNOSIS — F33.0 MILD EPISODE OF RECURRENT MAJOR DEPRESSIVE DISORDER (H): ICD-10-CM

## 2022-11-18 DIAGNOSIS — U07.1 INFECTION DUE TO 2019 NOVEL CORONAVIRUS: Primary | ICD-10-CM

## 2022-11-18 DIAGNOSIS — J45.20 MILD INTERMITTENT ASTHMA WITHOUT COMPLICATION: ICD-10-CM

## 2022-11-18 PROBLEM — M19.019 GLENOHUMERAL ARTHRITIS: Status: ACTIVE | Noted: 2021-06-30

## 2022-11-18 PROBLEM — G89.29 CHRONIC PAIN: Status: ACTIVE | Noted: 2022-01-21

## 2022-11-18 PROBLEM — M19.019 LOCALIZED, PRIMARY OSTEOARTHRITIS OF SHOULDER REGION: Status: ACTIVE | Noted: 2022-01-21

## 2022-11-18 PROBLEM — M25.519 SHOULDER JOINT PAIN: Status: ACTIVE | Noted: 2022-01-21

## 2022-11-18 PROBLEM — Z79.891 LONG TERM (CURRENT) USE OF OPIATE ANALGESIC: Status: ACTIVE | Noted: 2022-01-21

## 2022-11-18 PROBLEM — F11.20 OPIOID DEPENDENCE (H): Status: ACTIVE | Noted: 2022-01-21

## 2022-11-18 PROCEDURE — 99214 OFFICE O/P EST MOD 30 MIN: CPT | Mod: CS | Performed by: NURSE PRACTITIONER

## 2022-11-18 RX ORDER — NORTRIPTYLINE HCL 25 MG
CAPSULE ORAL
COMMUNITY
End: 2022-11-18

## 2022-11-18 RX ORDER — ALBUTEROL SULFATE 90 UG/1
2 AEROSOL, METERED RESPIRATORY (INHALATION) EVERY 4 HOURS PRN
Qty: 18 G | Refills: 0 | Status: SHIPPED | OUTPATIENT
Start: 2022-11-18 | End: 2024-01-22

## 2022-11-18 RX ORDER — ASPIRIN 81 MG/1
TABLET, COATED ORAL
COMMUNITY
Start: 2022-02-10 | End: 2023-02-15

## 2022-11-18 RX ORDER — ONDANSETRON 4 MG/1
TABLET, FILM COATED ORAL
COMMUNITY
Start: 2022-09-16 | End: 2023-02-15

## 2022-11-18 RX ORDER — NITROFURANTOIN 25; 75 MG/1; MG/1
100 CAPSULE ORAL
COMMUNITY
Start: 2022-09-08 | End: 2022-11-18

## 2022-11-18 RX ORDER — ESCITALOPRAM OXALATE 10 MG/1
1 TABLET ORAL
COMMUNITY
End: 2022-11-18

## 2022-11-18 RX ORDER — OXYCODONE HYDROCHLORIDE 5 MG/1
1 TABLET ORAL
COMMUNITY
Start: 2022-04-19 | End: 2022-11-18

## 2022-11-18 NOTE — PROGRESS NOTES
Vicki is a 37 year old who is being evaluated via a billable video visit.      How would you like to obtain your AVS? MyChart  If the video visit is dropped, the invitation should be resent by: Text to cell phone: [phone]  Will anyone else be joining your video visit? No            ICD-10-CM    1. Infection due to 2019 novel coronavirus  U07.1 nirmatrelvir and ritonavir (PAXLOVID) therapy pack      2. Mild intermittent asthma without complication  J45.20 albuterol (PROAIR HFA/PROVENTIL HFA/VENTOLIN HFA) 108 (90 Base) MCG/ACT inhaler      3. Mild episode of recurrent major depressive disorder (H)  F33.0         Discussed treatment options.  We will start Paxlovid, take as directed.  Educated on its indications and side effects.  Provided prescription for albuterol to use as needed for chest tightness and wheezing.  Discussed symptomatic treatment for fever or discomfort with over-the-counter acetaminophen or ibuprofen as needed.  Recommend using over-the-counter nasal saline sprays to assist with sinus congestion and any postnasal drainage.  Discussed the importance of deep breathing exercises.  The patient is to avoid laying flat on the back.  I encouraged the patient to monitor oxygen saturations at home.  If the patient develops shortness of breath, suggest ER evaluation.  Recommend quarantine per CDC and MD guidelines. The patient is content with the plan.       Subjective   Vicki is a 37 year old presenting for the following health issues:  No chief complaint on file.  Patient has been experiencing cold symptoms since Tuesday.  She complains of sinus congestion.  This morning, she developed shortness of breath, fatigue, diarrhea, vomiting, productive cough, chest tightness.  She denies wheezing.  She has had body aches and chills.  She denies fever.  She has had a headache.  She has been taking over-the-counter DayQuil and Zofran.  Her  is also ill.  She tested positive for COVID this morning at  home.  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  He has a history of mild intermittent asthma and uses albuterol as needed.  She also has a history of depression and is not currently taking medication.      Review of Systems   Constitutional, HEENT, cardiovascular, pulmonary, gi and gu systems are negative, except as otherwise noted.      Objective           Vitals:  No vitals were obtained today due to virtual visit.    Physical Exam   GENERAL: Healthy, alert and no distress  EYES: Eyes grossly normal to inspection.  No discharge or erythema, or obvious scleral/conjunctival abnormalities.  HENT: Normal cephalic/atraumatic.  External ears, nose and mouth without ulcers or lesions.  No nasal drainage visible.  NECK: No asymmetry, visible masses or scars  RESP: No audible wheeze, cough, or visible cyanosis.  No visible retractions or increased work of breathing.    MS: No gross musculoskeletal defects noted.  Normal range of motion.  No visible edema.  SKIN: Visible skin clear. No significant rash, abnormal pigmentation or lesions.  NEURO: Cranial nerves grossly intact.  Mentation and speech appropriate for age.  PSYCH: Mentation appears normal, affect normal/bright, judgement and insight intact, normal speech and appearance well-groomed.          Video-Visit Details    Video Start Time: 10:17 AM    Type of service:  Video Visit    Video End Time:10:25 AM    Originating Location (pt. Location): Home        Distant Location (provider location):  On-site    Platform used for Video Visit: Isidro

## 2022-11-18 NOTE — TELEPHONE ENCOUNTER
Messaged back via [employer]. Advised about alternating Tylenol and Ibuprofen for aches with COVID. Routed to provider for additional advice. Sasha Christopher RN on 11/18/2022 at 9:16 AM

## 2022-11-20 ENCOUNTER — HEALTH MAINTENANCE LETTER (OUTPATIENT)
Age: 37
End: 2022-11-20

## 2022-11-21 ENCOUNTER — MYC MEDICAL ADVICE (OUTPATIENT)
Dept: FAMILY MEDICINE | Facility: CLINIC | Age: 37
End: 2022-11-21

## 2022-11-21 NOTE — TELEPHONE ENCOUNTER
[facility] is calling stating they have been trying to get a hold of Gabbie to discuss insurance referral. Caller states she is quite frustrated that she has not been able to connect with anyone yet. Please call her back when you are available.     Secure Prior Auth department VM.

## 2022-11-21 NOTE — TELEPHONE ENCOUNTER
Is she referring to herself or her daughter? If this is related to her daughter, then she needs to send a note in her daughter's chart.  Thanks.

## 2022-11-22 DIAGNOSIS — R11.0 NAUSEA: ICD-10-CM

## 2022-11-22 RX ORDER — ONDANSETRON 4 MG/1
TABLET, FILM COATED ORAL
Qty: 20 TABLET | Refills: 4 | Status: SHIPPED | OUTPATIENT
Start: 2022-11-22 | End: 2023-02-15

## 2022-11-22 NOTE — TELEPHONE ENCOUNTER
I do not see a referral/order for [employer], the doctor needs to order this. And they are internal at spine care, no insurance referral inquired.   Thank you, Rose

## 2022-11-22 NOTE — TELEPHONE ENCOUNTER
Wrote back via [employer] and asked her to message through her daughter Debbie or call. Sasha Christopher RN on 11/22/2022 at 9:14 AM

## 2022-11-29 ENCOUNTER — TRANSFERRED RECORDS (OUTPATIENT)
Dept: HEALTH INFORMATION MANAGEMENT | Facility: CLINIC | Age: 37
End: 2022-11-29

## 2022-11-30 DIAGNOSIS — G89.29 OTHER CHRONIC PAIN: ICD-10-CM

## 2022-11-30 DIAGNOSIS — M54.12 CERVICAL RADICULOPATHY: Primary | ICD-10-CM

## 2022-11-30 DIAGNOSIS — M25.511 CHRONIC RIGHT SHOULDER PAIN: ICD-10-CM

## 2022-11-30 DIAGNOSIS — G89.29 CHRONIC RIGHT SHOULDER PAIN: ICD-10-CM

## 2022-12-01 NOTE — TELEPHONE ENCOUNTER
Spoke w/Joanna/[facility]~  Patient is an [employer], insurance referral form has been completed for facility services to [facility].    [facility]  NPI #[id number]  Tax ID # [id number]  Procedure/CPT- 15739/injection  DOS: 12.01.2022 thru 12.31.2023  24 visits authorized    Joanna will reach out to me via email if she has any questions regarding this referral

## 2022-12-07 NOTE — TELEPHONE ENCOUNTER
Received a call from Joanna wanting to speak with Gabbie. She states that this is high priority, she sent an email and has not heard back yet. Please call when you are available to update on status

## 2022-12-12 NOTE — TELEPHONE ENCOUNTER
Spoke w/Joanna, [employer] form was completed and sent over to [employer] program for review. Per [employer] form came everton stating incomplete. I have reviewed the form and don't see what was incomplete on it. I have faxed it bk to [employer] and will reach out to them regarding this.    I will notify Joanna toscano when approval has come bk from [employer]

## 2022-12-16 ENCOUNTER — TRANSFERRED RECORDS (OUTPATIENT)
Dept: HEALTH INFORMATION MANAGEMENT | Facility: CLINIC | Age: 37
End: 2022-12-16

## 2023-01-12 ENCOUNTER — E-VISIT (OUTPATIENT)
Dept: FAMILY MEDICINE | Facility: CLINIC | Age: 38
End: 2023-01-12
Payer: COMMERCIAL

## 2023-01-12 DIAGNOSIS — M54.50 LUMBAR PAIN: Primary | ICD-10-CM

## 2023-01-12 PROCEDURE — 99421 OL DIG E/M SVC 5-10 MIN: CPT | Performed by: NURSE PRACTITIONER

## 2023-01-12 RX ORDER — METHYLPREDNISOLONE 4 MG
TABLET, DOSE PACK ORAL
Qty: 21 TABLET | Refills: 0 | Status: SHIPPED | OUTPATIENT
Start: 2023-01-12 | End: 2023-02-15

## 2023-01-12 NOTE — PATIENT INSTRUCTIONS
Thank you for choosing us for your care. I have placed an order for a prescription so that you can start treatment. View your full visit summary for details by clicking on the link below. Your pharmacist will able to address any questions you may have about the medication.      If you are experiencing pain shooting down a leg or bowel or bladder incontinence or retention, I suggest urgent care or ER evaluation.     If you're not feeling better within 2-3 weeks please schedule an appointment.  You can schedule an appointment right here in [facility] [place], or call [phone]  If the visit is for the same symptoms as your eVisit, we'll refund the cost of your eVisit if seen within seven days.    Caring for Your Back    You are not alone.    Low back pain is very common. Nearly half of all adults will have low back pain in any given year. The good news is that back pain is rarely a danger to your health. Most people can manage their back pain on their own. About half of people start feeling better within two weeks. In 9 out of 10 cases, low back pain goes away or no longer limits daily activity within 6 weeks.     Your outlook is good!     Your symptoms tell us that your low back pain is most likely not a danger to you. Most of the time we will not know the exact cause of low back pain, even if you see a doctor or have an MRI. However, treatment can still work without knowing the cause of the pain. Less than 1 in 100 people need surgery for their back pain.     What can I do about my low back pain?     There are three basic things you can do to ease low back pain and help it go away.     Use heat or cold packs.    Take medicine as directed.    Use positions, movements and exercises.    Using heat or cold packs:    Try cold packs or gentle heat to ease your pain.  Use whichever gives you the most relief. Apply the cold pack or heat for 15 minutes at a time, as often as needed.    Taking medicine:    If taking  over-the-counter medicine:    Take ibuprofen (Advil, Motrin) 600 mg three times a day as needed for pain.  OR    Take Aleve (naproxen) 220 to 440 mg two times a day as needed for pain. If your doctor prescribed a muscle relaxant (cyclobenzaprine 10 mg.):    Take   to 1 tablet at bedtime.    Do not drive when taking this medicine. This drug may make you sleepy.     Using positions, movements and exercises:    Research tells us that moving your joints and muscles can help you recover from back pain. Such activity should be simple and gentle. Use the positions below as well as walking to help relieve your pain. Try taking a short walk every 3 to 4 hours during the day. Walk for a few minutes inside your home or take longer walks outside, on a treadmill or at a mall. Slowly increase the amount of time you walk. Expect discomfort when you begin, but it should lessen as your back starts to heal. When your back feels better, walk daily to keep your back and body healthy.    Finding a position that is comfortable:    When your back pain is new, certain positions will ease your pain. Gently try each of the positions below until you find one that is helpful. Once you find a position of comfort, use it as often as you like when you are resting. You will recover faster if you combine rest with activity.    * Lie on your back with your legs bent. You can do this by placing a pillow under your knees or lie on the floor and rest your lower legs on the seat of a chair.  * Lie on your side with your knees bent and place a pillow between your knees.    Lie on your stomach over pillows.       When should I call my doctor?    Your back pain should improve over the first couple of weeks. As it improves, you should be able to return to your normal activities.  But call your doctor if:      You have a sudden change in your ability to control your bladder or bowels.    You begin to feel tingling in your groin or legs.    The pain spreads  down your leg and into your foot.    Your toes, feet or leg muscles begin to feel weak.    You feel generally unwell or sick.    Your pain gets worse.    If you are deaf or hard of hearing, please let us know. We provide many free services including sign language interpreters, oral interpreters, TTYs, telephone amplifiers, note takers and written materials.    For informational purposes only. Not to replace the advice of your health care provider. Copyright   2013 [facility]. All rights reserved. [employer] 951017 - 04/13.

## 2023-01-16 ENCOUNTER — E-VISIT (OUTPATIENT)
Dept: FAMILY MEDICINE | Facility: CLINIC | Age: 38
End: 2023-01-16
Payer: COMMERCIAL

## 2023-01-16 DIAGNOSIS — M54.9 ACUTE BACK PAIN, UNSPECIFIED BACK LOCATION, UNSPECIFIED BACK PAIN LATERALITY: Primary | ICD-10-CM

## 2023-01-16 PROCEDURE — 99207 PR NON-BILLABLE SERV PER CHARTING: CPT | Performed by: NURSE PRACTITIONER

## 2023-01-16 NOTE — PATIENT INSTRUCTIONS
Thank you for choosing us for your care. It is difficulty to determine the cause of your pain. Based on the information provided, I believe you need to be seen immediately.  Please go to your nearest urgent care.  Otherwise, you can schedule an office visit.      You will not be charged for this eVisit.

## 2023-01-23 ENCOUNTER — NURSE TRIAGE (OUTPATIENT)
Dept: NURSING | Facility: CLINIC | Age: 38
End: 2023-01-23
Payer: COMMERCIAL

## 2023-01-23 NOTE — TELEPHONE ENCOUNTER
Nurse Triage SBAR    Is this a 2nd Level Triage? NO    Situation: Upper right side abdominal pain that spreads from back to front.     Background: Thought it was lower back pain originally. Was prescribed steroids a couple of weeks ago with no relief.     Assessment: The pain is always there at a 6/10. Then there is a sharp, shooting pain that comes on for a few minutes that is a 10/10 pain. Patient states she gets hot and feels faint during this time because it hurts so bad. Eating makes it worse. Cold liquids feel good.     Protocol Recommended Disposition:   Go to ED Now    Recommendation: Advised ED Now. Patient states she will go once she is done with work. Advised patient if it gets worse to go straight in. Verbalized understanding.     Blake Dobbins RN on 1/23/2023 at 12:08 PM    Reason for Disposition    SEVERE abdominal pain (e.g., excruciating)    Additional Information    Negative: Passed out (i.e., fainted, collapsed and was not responding)    Negative: Shock suspected (e.g., cold/pale/clammy skin, too weak to stand, low BP, rapid pulse)    Negative: Visible sweat on face or sweat is dripping down    Protocols used: ABDOMINAL PAIN - UPPER-A-OH

## 2023-01-24 ENCOUNTER — TELEPHONE (OUTPATIENT)
Dept: FAMILY MEDICINE | Facility: CLINIC | Age: 38
End: 2023-01-24

## 2023-01-24 NOTE — TELEPHONE ENCOUNTER
Pt is restricted recipient and is needed Sophie's signature approving Dr. Colin Mathew to perform surgery on 1/31/23.   CPT codes, 60815, 78581  ICD 10 Code : M25.511    [facility]  Fax# [phone]  If there are any questions please call back [phone]  is secure.

## 2023-01-30 ENCOUNTER — E-VISIT (OUTPATIENT)
Dept: FAMILY MEDICINE | Facility: CLINIC | Age: 38
End: 2023-01-30
Payer: COMMERCIAL

## 2023-01-30 DIAGNOSIS — R11.0 NAUSEA: Primary | ICD-10-CM

## 2023-01-30 PROCEDURE — 99421 OL DIG E/M SVC 5-10 MIN: CPT | Performed by: NURSE PRACTITIONER

## 2023-01-30 RX ORDER — ONDANSETRON 4 MG/1
4 TABLET, FILM COATED ORAL EVERY 12 HOURS PRN
Qty: 20 TABLET | Refills: 0 | Status: SHIPPED | OUTPATIENT
Start: 2023-01-30 | End: 2023-02-15

## 2023-01-30 NOTE — PATIENT INSTRUCTIONS
Thank you for choosing us for your care. I have placed an order for a prescription so that you can start treatment. View your full visit summary for details by clicking on the link below. Your pharmacist will able to address any questions you may have about the medication.     If you're not feeling better within 5-7 days, please schedule an appointment.  You can schedule an appointment right here in [facility], or call [phone]  If the visit is for the same symptoms as your eVisit, we'll refund the cost of your eVisit if seen within seven days.

## 2023-02-08 ENCOUNTER — TELEPHONE (OUTPATIENT)
Dept: FAMILY MEDICINE | Facility: CLINIC | Age: 38
End: 2023-02-08
Payer: COMMERCIAL

## 2023-02-08 NOTE — TELEPHONE ENCOUNTER
Reason for Call:  Appointment Request    Patient requesting this type of appt: Pre-op    Requested provider: Sophie Gibson    Reason patient unable to be scheduled: Not within requested timeframe    When does patient want to be seen/preferred time: 3-7 days    Comments: Patient's surgery date is 02-28-23 for her right shoulder.  She is willing to see any provider at this clinic location.      Could we send this information to you in [facility] or would you prefer to receive a phone call?:   Patient would prefer a phone call   Okay to leave a detailed message?: Yes at Cell number on file:    Telephone Information:   Mobile [phone]       Call taken on 2/8/2023 at 1:19 PM by Sonia Segal

## 2023-02-15 ENCOUNTER — OFFICE VISIT (OUTPATIENT)
Dept: FAMILY MEDICINE | Facility: CLINIC | Age: 38
End: 2023-02-15
Payer: COMMERCIAL

## 2023-02-15 VITALS
BODY MASS INDEX: 21.39 KG/M2 | HEART RATE: 89 BPM | SYSTOLIC BLOOD PRESSURE: 118 MMHG | TEMPERATURE: 99 F | HEIGHT: 65 IN | DIASTOLIC BLOOD PRESSURE: 78 MMHG | OXYGEN SATURATION: 96 % | WEIGHT: 128.4 LBS

## 2023-02-15 DIAGNOSIS — M25.511 CHRONIC RIGHT SHOULDER PAIN: ICD-10-CM

## 2023-02-15 DIAGNOSIS — G89.29 CHRONIC RIGHT SHOULDER PAIN: ICD-10-CM

## 2023-02-15 DIAGNOSIS — Z01.818 PREOP EXAMINATION: Primary | ICD-10-CM

## 2023-02-15 DIAGNOSIS — R11.0 NAUSEA: ICD-10-CM

## 2023-02-15 DIAGNOSIS — F33.0 MILD EPISODE OF RECURRENT MAJOR DEPRESSIVE DISORDER (H): ICD-10-CM

## 2023-02-15 DIAGNOSIS — J45.20 MILD INTERMITTENT ASTHMA WITHOUT COMPLICATION: ICD-10-CM

## 2023-02-15 DIAGNOSIS — F41.1 ANXIETY STATE: ICD-10-CM

## 2023-02-15 DIAGNOSIS — F11.20 UNCOMPLICATED OPIOID DEPENDENCE (H): ICD-10-CM

## 2023-02-15 PROBLEM — M25.519 SHOULDER JOINT PAIN: Status: RESOLVED | Noted: 2022-01-21 | Resolved: 2023-02-15

## 2023-02-15 PROBLEM — R07.2 PRECORDIAL PAIN: Status: RESOLVED | Noted: 2020-09-16 | Resolved: 2023-02-15

## 2023-02-15 PROBLEM — R00.2 PALPITATIONS: Status: RESOLVED | Noted: 2020-09-25 | Resolved: 2023-02-15

## 2023-02-15 PROBLEM — R79.9 ABNORMAL BLOOD CHEMISTRY: Status: RESOLVED | Noted: 2019-03-26 | Resolved: 2023-02-15

## 2023-02-15 PROBLEM — R10.2 PELVIC PAIN: Status: RESOLVED | Noted: 2018-02-13 | Resolved: 2023-02-15

## 2023-02-15 PROBLEM — M43.6 TORTICOLLIS: Status: RESOLVED | Noted: 2019-03-26 | Resolved: 2023-02-15

## 2023-02-15 PROBLEM — S43.431A TEAR OF RIGHT GLENOID LABRUM: Status: RESOLVED | Noted: 2020-12-14 | Resolved: 2023-02-15

## 2023-02-15 LAB
BASOPHILS # BLD AUTO: 0 10E3/UL (ref 0–0.2)
BASOPHILS NFR BLD AUTO: 1 %
CRP SERPL-MCNC: <3 MG/L
EOSINOPHIL # BLD AUTO: 0.1 10E3/UL (ref 0–0.7)
EOSINOPHIL NFR BLD AUTO: 1 %
ERYTHROCYTE [DISTWIDTH] IN BLOOD BY AUTOMATED COUNT: 11.5 % (ref 10–15)
ERYTHROCYTE [SEDIMENTATION RATE] IN BLOOD BY WESTERGREN METHOD: 1 MM/HR (ref 0–20)
HCT VFR BLD AUTO: 43.1 % (ref 35–47)
HGB BLD-MCNC: 15 G/DL (ref 11.7–15.7)
IMM GRANULOCYTES # BLD: 0 10E3/UL
IMM GRANULOCYTES NFR BLD: 0 %
LYMPHOCYTES # BLD AUTO: 3 10E3/UL (ref 0.8–5.3)
LYMPHOCYTES NFR BLD AUTO: 37 %
MCH RBC QN AUTO: 34.1 PG (ref 26.5–33)
MCHC RBC AUTO-ENTMCNC: 34.8 G/DL (ref 31.5–36.5)
MCV RBC AUTO: 98 FL (ref 78–100)
MONOCYTES # BLD AUTO: 0.7 10E3/UL (ref 0–1.3)
MONOCYTES NFR BLD AUTO: 8 %
NEUTROPHILS # BLD AUTO: 4.2 10E3/UL (ref 1.6–8.3)
NEUTROPHILS NFR BLD AUTO: 53 %
PLATELET # BLD AUTO: 203 10E3/UL (ref 150–450)
RBC # BLD AUTO: 4.4 10E6/UL (ref 3.8–5.2)
WBC # BLD AUTO: 8 10E3/UL (ref 4–11)

## 2023-02-15 PROCEDURE — 36415 COLL VENOUS BLD VENIPUNCTURE: CPT | Performed by: NURSE PRACTITIONER

## 2023-02-15 PROCEDURE — 85025 COMPLETE CBC W/AUTO DIFF WBC: CPT | Performed by: NURSE PRACTITIONER

## 2023-02-15 PROCEDURE — 86140 C-REACTIVE PROTEIN: CPT | Performed by: NURSE PRACTITIONER

## 2023-02-15 PROCEDURE — 99214 OFFICE O/P EST MOD 30 MIN: CPT | Performed by: NURSE PRACTITIONER

## 2023-02-15 PROCEDURE — 85652 RBC SED RATE AUTOMATED: CPT | Performed by: NURSE PRACTITIONER

## 2023-02-15 RX ORDER — OXYCODONE AND ACETAMINOPHEN 5; 325 MG/1; MG/1
TABLET ORAL
COMMUNITY
Start: 2023-01-16 | End: 2023-07-13

## 2023-02-15 RX ORDER — ONDANSETRON 4 MG/1
TABLET, FILM COATED ORAL
Qty: 20 TABLET | Refills: 1 | Status: SHIPPED | OUTPATIENT
Start: 2023-02-15 | End: 2023-03-07

## 2023-02-15 ASSESSMENT — PAIN SCALES - GENERAL: PAINLEVEL: NO PAIN (0)

## 2023-02-15 NOTE — PROGRESS NOTES
[facility]  [address] [facility] [address]  [facility] [address]  Phone: [phone]  Fax: [phone]  Primary Provider: Sophie Gibson  Pre-op Performing Provider: SOPHIE GIBSON      PREOPERATIVE EVALUATION:  Today's date: 2/15/2023    Vicki Pierson is a 37 year old female who presents for a preoperative evaluation.    Surgical Information:  Surgery/Procedure: Right Capsular Release  Surgery Location: [facility]  Surgeon: Dr. Colin Mathew  Surgery Date: 02/28/2023  Time of Surgery: TBD  Where patient plans to recover: At home with family  Fax number for surgical facility:     Type of Anesthesia Anticipated: to be determined    Assessment & Plan     The proposed surgical procedure is considered INTERMEDIATE risk.    Preop examination  Preop exam performed.  There are no contraindications for surgery.  Patient will hold NSAIDs for 7 days prior to surgery.  - CBC with Platelets & Differential  - Erythrocyte sedimentation rate auto  - CRP inflammation  - CBC with Platelets & Differential  - Erythrocyte sedimentation rate auto  - CRP inflammation    Chronic right shoulder pain  Discussed taking over-the-counter acetaminophen as needed for pain.  She is seeing a pain clinic where she is prescribed Percocet.  She will avoid taking this the morning of surgery.    Nausea  She continues Zofran as needed.  - ondansetron (ZOFRAN) 4 MG tablet  Dispense: 20 tablet; Refill: 1    Mild episode of recurrent major depressive disorder (H)  Anxiety state  This is controlled without medication.    Mild intermittent asthma without complication  She continues albuterol as needed.    Uncomplicated opioid dependence (H)  She is seeing a pain clinic where she is prescribed Percocet.      RECOMMENDATION:  APPROVAL GIVEN to proceed with proposed procedure, without further diagnostic evaluation.  [id number]}    Subjective     HPI related to upcoming procedure: Patient has persistent and chronic pain since a right  shoulder arthroplasty 1 year ago.  She complains of a constant ache which is exacerbated with movement.  She has limited range of motion of the shoulder.  She is seen with a pain clinic where she is prescribed Percocet.  She also takes cyclobenzaprine as needed.  She has a history of intermittent nausea from medications.  She uses Zofran as needed.  She has intermittent asthma and denies any recent flares.  She uses albuterol as needed.  She has a history of depression which is managed without medication.    Preop Questions 2/14/2023   1. Have you ever had a heart attack or stroke? No   2. Have you ever had surgery on your heart or blood vessels, such as a stent placement, a coronary artery bypass, or surgery on an artery in your head, neck, heart, or legs? No   3. Do you have chest pain with activity? No   4. Do you have a history of  heart failure? No   5. Do you currently have a cold, bronchitis or symptoms of other infection? No   6. Do you have a cough, shortness of breath, or wheezing? No   7. Do you or anyone in your family have previous history of blood clots? No   8. Do you or does anyone in your family have a serious bleeding problem such as prolonged bleeding following surgeries or cuts? No   9. Have you ever had problems with anemia or been told to take iron pills? No   10. Have you had any abnormal blood loss such as black, tarry or bloody stools, or abnormal vaginal bleeding? No   11. Have you ever had a blood transfusion? No   12. Are you willing to have a blood transfusion if it is medically needed before, during, or after your surgery? Yes   13. Have you or any of your relatives ever had problems with anesthesia? YES - sister passed away while recovering from anesthesia    14. Do you have sleep apnea, excessive snoring or daytime drowsiness? No   15. Do you have any artifical heart valves or other implanted medical devices like a pacemaker, defibrillator, or continuous glucose monitor? No   16. Do  you have artificial joints? YES - right shoulder    17. Are you allergic to latex? No   18. Is there any chance that you may be pregnant? No       Health Care Directive:  Patient does not have a Health Care Directive or Living Will: Discussed advance care planning with patient; however, patient declined at this time.    Preoperative Review of :   reviewed - controlled substances reflected in medication list.      Status of Chronic Conditions:  See problem list for active medical problems.  Problems all longstanding and stable, except as noted/documented.  See ROS for pertinent symptoms related to these conditions.      Review of Systems  Constitutional, neuro, ENT, endocrine, pulmonary, cardiac, gastrointestinal, genitourinary, musculoskeletal, integument and psychiatric systems are negative, except as otherwise noted.    Patient Active Problem List    Diagnosis Date Noted     Opioid dependence (H) 01/21/2022     Priority: Medium     Chronic pain 01/21/2022     Priority: Medium     Localized, primary osteoarthritis of shoulder region 01/21/2022     Priority: Medium     Long term (current) use of opiate analgesic 01/21/2022     Priority: Medium     Shoulder joint pain 01/21/2022     Priority: Medium     Formatting of this note might be different from the original.  Added automatically from request for surgery [id number]       Glenohumeral arthritis 06/30/2021     Priority: Medium     Formatting of this note might be different from the original.  Added automatically from request for surgery [id number]    Formatting of this note might be different from the original.  Added automatically from request for surgery [id number]       Tear of right glenoid labrum 12/14/2020     Priority: Medium     Formatting of this note might be different from the original.  Added automatically from request for surgery [id number]       Palpitations 09/25/2020     Priority: Medium     Formatting of this note might be different from the  original.  Created by Conversion  Formatting of this note might be different from the original.  Created by Conversion    Formatting of this note might be different from the original.  Formatting of this note might be different from the original.  Created by Conversion  Formatting of this note might be different from the original.  Created by Conversion  Formatting of this note might be different from the original.  Formatting of this note might be different from the original.  Created by Conversion  Formatting of this note might be different from the original.  Created by Conversion       Precordial pain 09/16/2020     Priority: Medium     Created by Conversion    Formatting of this note might be different from the original.  Created by Conversion  Formatting of this note might be different from the original.  Created by Conversion  Created by Conversion    Formatting of this note might be different from the original.  Formatting of this note might be different from the original.  Created by Conversion  Formatting of this note might be different from the original.  Created by Conversion    Formatting of this note might be different from the original.  Created by Conversion  Formatting of this note might be different from the original.  Created by Conversion  Created by Conversion  Formatting of this note might be different from the original.  Created by Conversion  Created by Conversion       Torticollis 03/26/2019     Priority: Medium     Formatting of this note might be different from the original.  Created by Conversion    Replacement Utility updated for latest IMO load  Formatting of this note might be different from the original.  Created by Conversion    Replacement Utility updated for latest IMO load  Formatting of this note might be different from the original.  Overview:   Created by Conversion    Replacement Utility updated for latest IMO load  Created by Conversion    Replacement Utility updated for  latest IMO load    Formatting of this note might be different from the original.  Created by Conversion    Replacement Utility updated for latest IMO load    Formatting of this note might be different from the original.  Overview:   Created by Conversion    Replacement Utility updated for latest IMO load  Created by Conversion    Replacement Utility updated for latest IMO load  Formatting of this note might be different from the original.  Formatting of this note might be different from the original.  Created by Conversion    Replacement Utility updated for latest IMO load  Formatting of this note might be different from the original.  Created by Conversion    Replacement Utility updated for latest IMO load  Formatting of this note might be different from the original.  Overview:   Created by Conversion    Replacement Utility updated for latest IMO load  Created by Conversion    Replacement Utility updated for latest IMO load       Neck pain 03/26/2019     Priority: Medium     Formatting of this note might be different from the original.  Created by Conversion  Formatting of this note might be different from the original.  Created by Conversion  Formatting of this note might be different from the original.  Overview:   Created by Conversion  Created by Conversion    Formatting of this note might be different from the original.  Created by Conversion    Formatting of this note might be different from the original.  Overview:   Created by Conversion  Created by Conversion  Formatting of this note might be different from the original.  Formatting of this note might be different from the original.  Created by Conversion  Formatting of this note might be different from the original.  Created by Conversion  Formatting of this note might be different from the original.  Overview:   Created by Conversion  Created by Conversion       Abnormal blood chemistry 03/26/2019     Priority: Medium     Formatting of this note might  be different from the original.  Created by Conversion    Replacement Utility updated for latest IMO load  Formatting of this note might be different from the original.  Created by Conversion    Replacement Utility updated for latest IMO load  Formatting of this note might be different from the original.  Overview:   Created by Conversion    Replacement Utility updated for latest IMO load  Created by Conversion    Replacement Utility updated for latest IMO load    Formatting of this note might be different from the original.  Created by Conversion    Replacement Utility updated for latest IMO load    Formatting of this note might be different from the original.  Overview:   Created by Conversion    Replacement Utility updated for latest IMO load  Created by Conversion    Replacement Utility updated for latest IMO load  Formatting of this note might be different from the original.  Formatting of this note might be different from the original.  Created by Conversion    Replacement Utility updated for latest IMO load  Formatting of this note might be different from the original.  Created by Conversion    Replacement Utility updated for latest IMO load  Formatting of this note might be different from the original.  Overview:   Created by Conversion    Replacement Utility updated for latest IMO load  Created by Conversion    Replacement Utility updated for latest IMO load       Anxiety state 05/28/2018     Priority: Medium     Created by Conversion    Replacement Utility updated for latest IMO load    Formatting of this note might be different from the original.  Created by Conversion    Replacement Utility updated for latest IMO load  Formatting of this note might be different from the original.  Created by Conversion    Replacement Utility updated for latest IMO load  Created by Conversion    Replacement Utility updated for latest IMO load  Formatting of this note might be different from the original.  Overview:    Created by Conversion    Replacement Utility updated for latest IMO load    Formatting of this note might be different from the original.  Created by Conversion    Replacement Utility updated for latest IMO load  Created by Conversion    Replacement Utility updated for latest IMO load    Formatting of this note might be different from the original.  Created by Conversion    Replacement Utility updated for latest IMO load    Formatting of this note might be different from the original.  Created by Conversion    Replacement Utility updated for latest IMO load  Formatting of this note might be different from the original.  Created by Conversion    Replacement Utility updated for latest IMO load  Created by Conversion    Replacement Utility updated for latest IMO load  Formatting of this note might be different from the original.  Overview:   Created by Conversion    Replacement Utility updated for latest IMO load  Formatting of this note might be different from the original.  Created by Conversion    Replacement Utility updated for latest IMO load  Formatting of this note might be different from the original.  Overview:   Created by Conversion    Replacement Utility updated for latest IMO load       Endometriosis 05/28/2018     Priority: Medium     Created by Conversion    Replacement Utility updated for latest IMO load    Formatting of this note might be different from the original.  Created by Conversion    Replacement Utility updated for latest IMO load  Formatting of this note might be different from the original.  Created by Conversion    Replacement Utility updated for latest IMO load  Created by Conversion    Replacement Utility updated for latest IMO load  Formatting of this note might be different from the original.  Overview:   Created by Conversion    Replacement Utility updated for latest IMO load    Formatting of this note might be different from the original.  Created by Conversion    Replacement  Utility updated for latest IMO load  Created by Conversion    Replacement Utility updated for latest IMO load    Formatting of this note might be different from the original.  Created by Conversion    Replacement Utility updated for latest IMO load    Formatting of this note might be different from the original.  Created by Conversion    Replacement Utility updated for latest IMO load  Formatting of this note might be different from the original.  Created by Conversion    Replacement Utility updated for latest IMO load  Created by Conversion    Replacement Utility updated for latest IMO load  Formatting of this note might be different from the original.  Overview:   Created by Conversion    Replacement Utility updated for latest IMO load  Formatting of this note might be different from the original.  Created by Conversion    Replacement Utility updated for latest IMO load  Formatting of this note might be different from the original.  Overview:   Created by Conversion    Replacement Utility updated for latest IMO load       Major depression, recurrent (H) 05/28/2018     Priority: Medium     Created by Conversion    Replacement Utility updated for latest IMO load    Formatting of this note might be different from the original.  Created by Conversion    Replacement Utility updated for latest IMO load  Formatting of this note might be different from the original.  Created by Conversion    Replacement Utility updated for latest IMO load  Created by Conversion    Replacement Utility updated for latest IMO load  Formatting of this note might be different from the original.  Overview:   Created by Conversion    Replacement Utility updated for latest IMO load    Formatting of this note might be different from the original.  Created by Conversion    Replacement Utility updated for latest IMO load  Created by Conversion    Replacement Utility updated for latest IMO load    Formatting of this note might be different from the  original.  Created by Conversion    Replacement Utility updated for latest IMO load    Formatting of this note might be different from the original.  Created by Conversion    Replacement Utility updated for latest IMO load  Formatting of this note might be different from the original.  Created by Conversion    Replacement Utility updated for latest IMO load  Created by Conversion    Replacement Utility updated for latest IMO load  Formatting of this note might be different from the original.  Overview:   Created by Conversion    Replacement Utility updated for latest IMO load  Formatting of this note might be different from the original.  Created by Conversion    Replacement Utility updated for latest IMO load  Formatting of this note might be different from the original.  Overview:   Created by Conversion    Replacement Utility updated for latest IMO load       Tension-type headache 05/28/2018     Priority: Medium     Created by Conversion    Replacement Utility updated for latest IMO load    Formatting of this note might be different from the original.  Created by Conversion    Replacement Utility updated for latest IMO load  Formatting of this note might be different from the original.  Created by Conversion    Replacement Utility updated for latest IMO load  Created by Conversion    Replacement Utility updated for latest IMO load  Formatting of this note might be different from the original.  Overview:   Created by Conversion    Replacement Utility updated for latest IMO load    Formatting of this note might be different from the original.  Created by Conversion    Replacement Utility updated for latest IMO load  Created by Conversion    Replacement Utility updated for latest IMO load    Formatting of this note might be different from the original.  Created by Conversion    Replacement Utility updated for latest IMO load    Formatting of this note might be different from the original.  Created by  Conversion    Replacement Utility updated for latest IMO load  Formatting of this note might be different from the original.  Created by Conversion    Replacement Utility updated for latest IMO load  Created by Conversion    Replacement Utility updated for latest IMO load  Formatting of this note might be different from the original.  Overview:   Created by Conversion    Replacement Utility updated for latest IMO load  Formatting of this note might be different from the original.  Created by Conversion    Replacement Utility updated for latest IMO load  Formatting of this note might be different from the original.  Overview:   Created by Conversion    Replacement Utility updated for latest IMO load       Asthma 05/28/2018     Priority: Medium     Formatting of this note might be different from the original.  Created by Conversion  Formatting of this note might be different from the original.  Created by Conversion  Created by Conversion  Formatting of this note might be different from the original.  Overview:   Created by Conversion    Formatting of this note might be different from the original.  Created by Conversion  Created by Conversion    Formatting of this note might be different from the original.  Created by Conversion  Formatting of this note might be different from the original.  Overview:   Created by Conversion  Formatting of this note might be different from the original.  Formatting of this note might be different from the original.  Created by Conversion  Formatting of this note might be different from the original.  Created by Conversion  Created by Conversion  Formatting of this note might be different from the original.  Overview:   Created by Conversion       Allergic rhinitis 05/28/2018     Priority: Medium     Formatting of this note might be different from the original.  Created by Conversion    Replacement Utility updated for latest IMO load  Formatting of this note might be different from  the original.  Created by Conversion    Replacement Utility updated for latest IMO load  Created by Conversion    Replacement Utility updated for latest IMO load  Formatting of this note might be different from the original.  Overview:   Created by Conversion    Replacement Utility updated for latest IMO load    Formatting of this note might be different from the original.  Created by Conversion    Replacement Utility updated for latest IMO load  Created by Conversion    Replacement Utility updated for latest IMO load    Formatting of this note might be different from the original.  Created by Conversion    Replacement Utility updated for latest IMO load  Formatting of this note might be different from the original.  Overview:   Created by Conversion    Replacement Utility updated for latest IMO load  Formatting of this note might be different from the original.  Formatting of this note might be different from the original.  Created by Conversion    Replacement Utility updated for latest IMO load  Formatting of this note might be different from the original.  Created by Conversion    Replacement Utility updated for latest IMO load  Created by Conversion    Replacement Utility updated for latest IMO load  Formatting of this note might be different from the original.  Overview:   Created by Conversion    Replacement Utility updated for latest IMO load       Left ovarian cyst 02/13/2018     Priority: Medium     Pelvic pain 02/13/2018     Priority: Medium     S/P partial hysterectomy 03/09/2015     Priority: Medium     August 2012    Formatting of this note might be different from the original.  August 2012  Formatting of this note might be different from the original.  August 2012 August 2012  Formatting of this note might be different from the original.  Overview:   August 2012    Formatting of this note might be different from the original.  August 2012 August 2012    Formatting of this note might be different from  the original.  August 2012    Formatting of this note might be different from the original.  August 2012  Formatting of this note might be different from the original.  August 2012 August 2012  Formatting of this note might be different from the original.  Overview:   August 2012  Formatting of this note might be different from the original.  August 2012  Formatting of this note might be different from the original.  Overview:   August 2012       Abnormal weight loss 01/22/2015     Priority: Medium     Cervical radiculopathy 11/24/2014     Priority: Medium     Asthma with exacerbation 05/15/2010     Priority: Medium      Past Medical History:   Diagnosis Date     Allergic rhinitis, cause unspecified     Created by Conversion      Anxiety state, unspecified     Created by Conversion      Arthritis      Asthma      Cervical disc displacement      Depression      Depressive disorder      Endometriosis      Endometriosis, site unspecified      External hemorrhoid      Ganglion cyst of left groin      Groin cyst      Head injury      Heart palpitations     wore holter monitor for 24 hours     History of anesthesia complications     itchy in recovery     Major depression      Major depressive disorder, recurrent episode, unspecified     Created by Conversion      Migraines      PONV (postoperative nausea and vomiting)      Unspecified asthma(493.90)     Created by Conversion      Past Surgical History:   Procedure Laterality Date     CERVICAL FUSION N/A 11/24/2014    Procedure: Anterior Cervical Decompression Fusion Cervical 6 through Cervical 7 Bilateral;  Surgeon: Joselito Wolfe MD;  Location: [facility] OR;  Service:      CYST REMOVAL      [place]     HEMORRHOIDECTOMY EXTERNAL N/A 07/19/2018    Procedure: HEMORRHOIDECTOMY;  Surgeon: Anette Klein MD;  Location: [facility] OR;  Service:      OOPHORECTOMY Left      TN LAP,DIAGNOSTIC ABDOMEN Left 02/13/2018    Procedure: LAPAROSCOPY LEFT  OOPHORECTOMY;   Surgeon: Letty Floyd DO;  Location: [facility];  Service: Gynecology     SHOULDER SURGERY Right      TUBAL LIGATION       ZC LIGATE FALLOPIAN TUBE      Description: Tubal Ligation;  Recorded: 11/21/2014;     ZZC TOTAL ABDOM HYSTERECTOMY      Description: Hysterectomy;  Recorded: 11/21/2014;     Current Outpatient Medications   Medication Sig Dispense Refill     albuterol (PROAIR HFA/PROVENTIL HFA/VENTOLIN HFA) 108 (90 Base) MCG/ACT inhaler Inhale 2 puffs into the lungs every 4 hours as needed for shortness of breath / dyspnea or wheezing 18 g 0     albuterol (PROAIR HFA/PROVENTIL HFA/VENTOLIN HFA) 108 (90 Base) MCG/ACT inhaler Inhale 2 puffs into the lungs       cyclobenzaprine (FLEXERIL) 5 MG tablet Take 5-10 mg by mouth       ibuprofen (ADVIL/MOTRIN) 800 MG tablet TAKE 1 TABLET BY MOUTH WITH FOOD OR MILK THREE TIMES DAILY AS NEEDED       methylPREDNISolone (MEDROL DOSEPAK) 4 MG tablet therapy pack Follow Package Directions 21 tablet 0     NARCAN 4 MG/0.1ML nasal spray CALL 911. SPR CONTENTS OF ONE SPRAYER (0.1ML) INTO ONE NOSTRIL. REPEAT IN 2-3 MIN IF SYMPTOMS OF OPIOID EMERGENCY PERSIST, ALTERNATE NOSTRIL       ondansetron (ZOFRAN ODT) 4 MG ODT tab        ondansetron (ZOFRAN) 4 MG tablet Take 1 tablet (4 mg) by mouth every 12 hours as needed for nausea 20 tablet 0     ondansetron (ZOFRAN) 4 MG tablet TAKE 1 TABLET(4 MG) BY MOUTH EVERY 12 HOURS AS NEEDED FOR NAUSEA 20 tablet 4     ondansetron (ZOFRAN) 4 MG tablet        oxyCODONE-acetaminophen (PERCOCET) 5-325 MG tablet        acetaminophen (TYLENOL) 500 MG tablet Take 500 mg by mouth (Patient not taking: Reported on 2/15/2023)       ASPIRIN LOW DOSE 81 MG EC tablet  (Patient not taking: Reported on 2/15/2023)         Allergies   Allergen Reactions     Bee Venom Anaphylaxis     Tramadol Hives     Gabapentin Other (See Comments)     Tolerated 10/2021. Can take a few doses, just not daily.     Ketorolac Other (See Comments)     Other reaction(s):  "*Unknown  Other reaction(s): *Unknown. Can take a couple doses, just not daily     Ketorolac Tromethamine Other (See Comments)     Other reaction(s): hives, tight chest can't breathe     Meloxicam Other (See Comments)     Nortriptyline Other (See Comments)     Venlafaxine Anxiety and Other (See Comments)     Other reaction(s): Chest Pain  Other reaction(s): Chest Pain        Social History     Tobacco Use     Smoking status: Former     Smokeless tobacco: Former     Quit date: 9/2/2003     Tobacco comments:     quit smoking 14 yrs ago   Substance Use Topics     Alcohol use: Not Currently     Alcohol/week: 1.0 standard drink     Family History   Problem Relation Age of Onset     No Known Problems Mother      Lung Cancer Father      Cerebrovascular Disease Father      Brain Hemorrhage Maternal Grandmother      Cancer Maternal Grandmother      Cancer Maternal Grandfather      Brain Hemorrhage Paternal Grandmother      Diabetes Paternal Grandmother      History   Drug Use     Types: Marijuana     Comment: Drug use: recreational         Objective     /78 (BP Location: Left arm, Patient Position: Sitting, Cuff Size: Adult Regular)   Pulse 89   Temp 99  F (37.2  C) (Temporal)   Ht 1.655 m (5' 5.16\")   Wt 58.2 kg (128 lb 6.4 oz)   SpO2 96%   BMI 21.26 kg/m      Physical Exam    GENERAL APPEARANCE: healthy, alert and no distress     EYES: EOMI, PERRL     HENT: ear canals and TM's normal and nose and mouth without ulcers or lesions     NECK: no adenopathy, no asymmetry, masses, or scars and thyroid normal to palpation     RESP: lungs clear to auscultation - no rales, rhonchi or wheezes     CV: regular rates and rhythm, normal S1 S2, no S3 or S4 and no murmur, click or rub     ABDOMEN:  soft, nontender, no HSM or masses and bowel sounds normal     MS: extremities normal- no gross deformities noted, no evidence of inflammation in joints, FROM in all extremities.     SKIN: no suspicious lesions or rashes     NEURO: " Normal strength and tone, sensory exam grossly normal, mentation intact and speech normal     PSYCH: mentation appears normal. and affect normal/bright     LYMPHATICS: No cervical adenopathy    Recent Labs   Lab Test 08/17/21  1320   HGB 14.2        Diagnostics:  No labs were ordered during this visit. Orders placed by surgeon.    No EKG required, no history of coronary heart disease, significant arrhythmia, peripheral arterial disease or other structural heart disease.    Revised Cardiac Risk Index (RCRI):  The patient has the following serious cardiovascular risks for perioperative complications:   - No serious cardiac risks = 0 points     RCRI Interpretation: 0 points: Class I (very low risk - 0.4% complication rate)           Signed Electronically by: ANTON Cheema CNP  Copy of this evaluation report is provided to requesting physician.

## 2023-02-23 ENCOUNTER — MYC MEDICAL ADVICE (OUTPATIENT)
Dept: FAMILY MEDICINE | Facility: CLINIC | Age: 38
End: 2023-02-23
Payer: COMMERCIAL

## 2023-02-24 ENCOUNTER — E-VISIT (OUTPATIENT)
Dept: FAMILY MEDICINE | Facility: CLINIC | Age: 38
End: 2023-02-24
Payer: COMMERCIAL

## 2023-02-24 DIAGNOSIS — Z71.9 COUNSELED BY NURSE: Primary | ICD-10-CM

## 2023-02-24 PROCEDURE — 99207 PR NON-BILLABLE SERV PER CHARTING: CPT

## 2023-02-27 ENCOUNTER — TELEPHONE (OUTPATIENT)
Dept: FAMILY MEDICINE | Facility: CLINIC | Age: 38
End: 2023-02-27
Payer: COMMERCIAL

## 2023-02-27 NOTE — TELEPHONE ENCOUNTER
Waiting on a fax. [facility] Dr. Colin Cintron.  Verification.  Letter on 2/24/23 needs to be faxed.     Faxed    Attn: [place] in [place].    Verbal orders given and will fax letter now.   Lyla Norton RN on 2/27/2023 at 11:45 AM

## 2023-02-28 ENCOUNTER — TRANSFERRED RECORDS (OUTPATIENT)
Dept: HEALTH INFORMATION MANAGEMENT | Facility: CLINIC | Age: 38
End: 2023-02-28

## 2023-02-28 PROCEDURE — 87075 CULTR BACTERIA EXCEPT BLOOD: CPT | Mod: ORL | Performed by: ORTHOPAEDIC SURGERY

## 2023-02-28 PROCEDURE — 87205 SMEAR GRAM STAIN: CPT | Mod: ORL | Performed by: ORTHOPAEDIC SURGERY

## 2023-02-28 PROCEDURE — 87070 CULTURE OTHR SPECIMN AEROBIC: CPT | Mod: ORL | Performed by: ORTHOPAEDIC SURGERY

## 2023-03-07 ENCOUNTER — MYC MEDICAL ADVICE (OUTPATIENT)
Dept: FAMILY MEDICINE | Facility: CLINIC | Age: 38
End: 2023-03-07
Payer: COMMERCIAL

## 2023-03-07 DIAGNOSIS — R11.0 NAUSEA: ICD-10-CM

## 2023-03-07 RX ORDER — ONDANSETRON 4 MG/1
TABLET, FILM COATED ORAL
Qty: 20 TABLET | Refills: 1 | Status: SHIPPED | OUTPATIENT
Start: 2023-03-07 | End: 2023-04-20

## 2023-03-14 ENCOUNTER — TRANSFERRED RECORDS (OUTPATIENT)
Dept: HEALTH INFORMATION MANAGEMENT | Facility: CLINIC | Age: 38
End: 2023-03-14

## 2023-04-04 ENCOUNTER — TRANSFERRED RECORDS (OUTPATIENT)
Dept: HEALTH INFORMATION MANAGEMENT | Facility: CLINIC | Age: 38
End: 2023-04-04
Payer: COMMERCIAL

## 2023-04-11 ENCOUNTER — TRANSFERRED RECORDS (OUTPATIENT)
Dept: HEALTH INFORMATION MANAGEMENT | Facility: CLINIC | Age: 38
End: 2023-04-11
Payer: COMMERCIAL

## 2023-04-11 DIAGNOSIS — G89.29 OTHER CHRONIC PAIN: Primary | ICD-10-CM

## 2023-04-14 ENCOUNTER — MYC MEDICAL ADVICE (OUTPATIENT)
Dept: FAMILY MEDICINE | Facility: CLINIC | Age: 38
End: 2023-04-14
Payer: COMMERCIAL

## 2023-04-15 ENCOUNTER — HEALTH MAINTENANCE LETTER (OUTPATIENT)
Age: 38
End: 2023-04-15

## 2023-04-19 ENCOUNTER — E-VISIT (OUTPATIENT)
Dept: FAMILY MEDICINE | Facility: CLINIC | Age: 38
End: 2023-04-19
Payer: COMMERCIAL

## 2023-04-19 ENCOUNTER — MYC MEDICAL ADVICE (OUTPATIENT)
Dept: FAMILY MEDICINE | Facility: CLINIC | Age: 38
End: 2023-04-19

## 2023-04-19 DIAGNOSIS — R10.13 ABDOMINAL PAIN, EPIGASTRIC: Primary | ICD-10-CM

## 2023-04-19 DIAGNOSIS — R11.0 NAUSEA: ICD-10-CM

## 2023-04-19 PROCEDURE — 99207 PR NON-BILLABLE SERV PER CHARTING: CPT | Performed by: NURSE PRACTITIONER

## 2023-04-19 NOTE — PATIENT INSTRUCTIONS
Thank you for choosing us for your care. I think an in-clinic visit would be best next steps based on your symptoms. Please schedule a clinic appointment; you won t be charged for this eVisit.      You can schedule an appointment right here in [facility], or call [phone]

## 2023-04-19 NOTE — TELEPHONE ENCOUNTER
Last OV: 2/15/2023  Next OV: N/A    Pending Prescriptions:                       Disp   Refills    ondansetron (ZOFRAN) 4 MG tablet          20 tab*1            Sig: TAKE 1 TABLET(4 MG) BY MOUTH EVERY 12 HOURS AS           NEEDED FOR NAUSEA

## 2023-04-20 RX ORDER — ONDANSETRON 4 MG/1
TABLET, FILM COATED ORAL
Qty: 20 TABLET | Refills: 1 | Status: SHIPPED | OUTPATIENT
Start: 2023-04-20 | End: 2023-05-19

## 2023-04-25 ENCOUNTER — OFFICE VISIT (OUTPATIENT)
Dept: FAMILY MEDICINE | Facility: CLINIC | Age: 38
End: 2023-04-25
Payer: COMMERCIAL

## 2023-04-25 ENCOUNTER — MYC MEDICAL ADVICE (OUTPATIENT)
Dept: FAMILY MEDICINE | Facility: CLINIC | Age: 38
End: 2023-04-25

## 2023-04-25 VITALS
OXYGEN SATURATION: 98 % | RESPIRATION RATE: 16 BRPM | SYSTOLIC BLOOD PRESSURE: 114 MMHG | WEIGHT: 127.3 LBS | HEIGHT: 65 IN | BODY MASS INDEX: 21.21 KG/M2 | TEMPERATURE: 98.7 F | HEART RATE: 83 BPM | DIASTOLIC BLOOD PRESSURE: 60 MMHG

## 2023-04-25 DIAGNOSIS — R10.11 ABDOMINAL PAIN, RIGHT UPPER QUADRANT: Primary | ICD-10-CM

## 2023-04-25 DIAGNOSIS — M25.511 CHRONIC RIGHT SHOULDER PAIN: ICD-10-CM

## 2023-04-25 DIAGNOSIS — R82.90 ABNORMAL URINALYSIS: ICD-10-CM

## 2023-04-25 DIAGNOSIS — G89.29 CHRONIC RIGHT SHOULDER PAIN: ICD-10-CM

## 2023-04-25 DIAGNOSIS — R82.90 ABNORMAL URINALYSIS: Primary | ICD-10-CM

## 2023-04-25 LAB
ALBUMIN SERPL BCG-MCNC: 4.8 G/DL (ref 3.5–5.2)
ALBUMIN UR-MCNC: 30 MG/DL
ALP SERPL-CCNC: 83 U/L (ref 35–104)
ALT SERPL W P-5'-P-CCNC: 14 U/L (ref 10–35)
ANION GAP SERPL CALCULATED.3IONS-SCNC: 14 MMOL/L (ref 7–15)
APPEARANCE UR: CLEAR
AST SERPL W P-5'-P-CCNC: 20 U/L (ref 10–35)
BACTERIA #/AREA URNS HPF: ABNORMAL /HPF
BILIRUB SERPL-MCNC: 0.5 MG/DL
BILIRUB UR QL STRIP: NEGATIVE
BUN SERPL-MCNC: 21.1 MG/DL (ref 6–20)
CALCIUM SERPL-MCNC: 9.6 MG/DL (ref 8.6–10)
CHLORIDE SERPL-SCNC: 104 MMOL/L (ref 98–107)
COLOR UR AUTO: YELLOW
CREAT SERPL-MCNC: 1.12 MG/DL (ref 0.51–0.95)
DEPRECATED HCO3 PLAS-SCNC: 25 MMOL/L (ref 22–29)
ERYTHROCYTE [DISTWIDTH] IN BLOOD BY AUTOMATED COUNT: 11.4 % (ref 10–15)
GFR SERPL CREATININE-BSD FRML MDRD: 64 ML/MIN/1.73M2
GLUCOSE SERPL-MCNC: 103 MG/DL (ref 70–99)
GLUCOSE UR STRIP-MCNC: NEGATIVE MG/DL
HCT VFR BLD AUTO: 44.4 % (ref 35–47)
HGB BLD-MCNC: 15 G/DL (ref 11.7–15.7)
HGB UR QL STRIP: ABNORMAL
KETONES UR STRIP-MCNC: NEGATIVE MG/DL
LEUKOCYTE ESTERASE UR QL STRIP: ABNORMAL
LIPASE SERPL-CCNC: 14 U/L (ref 13–60)
MCH RBC QN AUTO: 33.9 PG (ref 26.5–33)
MCHC RBC AUTO-ENTMCNC: 33.8 G/DL (ref 31.5–36.5)
MCV RBC AUTO: 100 FL (ref 78–100)
NITRATE UR QL: NEGATIVE
PH UR STRIP: 6 [PH] (ref 5–8)
PLATELET # BLD AUTO: 234 10E3/UL (ref 150–450)
POTASSIUM SERPL-SCNC: 4.6 MMOL/L (ref 3.4–5.3)
PROT SERPL-MCNC: 7.1 G/DL (ref 6.4–8.3)
RBC # BLD AUTO: 4.43 10E6/UL (ref 3.8–5.2)
RBC #/AREA URNS AUTO: ABNORMAL /HPF
SODIUM SERPL-SCNC: 143 MMOL/L (ref 136–145)
SP GR UR STRIP: 1.02 (ref 1–1.03)
SQUAMOUS #/AREA URNS AUTO: ABNORMAL /LPF
UROBILINOGEN UR STRIP-ACNC: 0.2 E.U./DL
WBC # BLD AUTO: 8.9 10E3/UL (ref 4–11)
WBC #/AREA URNS AUTO: ABNORMAL /HPF

## 2023-04-25 PROCEDURE — 99214 OFFICE O/P EST MOD 30 MIN: CPT | Performed by: NURSE PRACTITIONER

## 2023-04-25 PROCEDURE — 80053 COMPREHEN METABOLIC PANEL: CPT | Performed by: NURSE PRACTITIONER

## 2023-04-25 PROCEDURE — 36415 COLL VENOUS BLD VENIPUNCTURE: CPT | Performed by: NURSE PRACTITIONER

## 2023-04-25 PROCEDURE — 85027 COMPLETE CBC AUTOMATED: CPT | Performed by: NURSE PRACTITIONER

## 2023-04-25 PROCEDURE — 83690 ASSAY OF LIPASE: CPT | Performed by: NURSE PRACTITIONER

## 2023-04-25 PROCEDURE — 81001 URINALYSIS AUTO W/SCOPE: CPT | Performed by: NURSE PRACTITIONER

## 2023-04-25 PROCEDURE — 87086 URINE CULTURE/COLONY COUNT: CPT | Performed by: NURSE PRACTITIONER

## 2023-04-25 RX ORDER — HYDROXYZINE HYDROCHLORIDE 25 MG/1
25-50 TABLET, FILM COATED ORAL EVERY 6 HOURS PRN
COMMUNITY
Start: 2023-04-06 | End: 2024-06-10

## 2023-04-25 RX ORDER — NITROFURANTOIN 25; 75 MG/1; MG/1
100 CAPSULE ORAL 2 TIMES DAILY
Qty: 10 CAPSULE | Refills: 0 | Status: SHIPPED | OUTPATIENT
Start: 2023-04-25 | End: 2023-04-30

## 2023-04-25 ASSESSMENT — ASTHMA QUESTIONNAIRES
QUESTION_2 LAST FOUR WEEKS HOW OFTEN HAVE YOU HAD SHORTNESS OF BREATH: NOT AT ALL
ACT_TOTALSCORE: 25
QUESTION_4 LAST FOUR WEEKS HOW OFTEN HAVE YOU USED YOUR RESCUE INHALER OR NEBULIZER MEDICATION (SUCH AS ALBUTEROL): NOT AT ALL
QUESTION_5 LAST FOUR WEEKS HOW WOULD YOU RATE YOUR ASTHMA CONTROL: COMPLETELY CONTROLLED
QUESTION_1 LAST FOUR WEEKS HOW MUCH OF THE TIME DID YOUR ASTHMA KEEP YOU FROM GETTING AS MUCH DONE AT WORK, SCHOOL OR AT HOME: NONE OF THE TIME
QUESTION_3 LAST FOUR WEEKS HOW OFTEN DID YOUR ASTHMA SYMPTOMS (WHEEZING, COUGHING, SHORTNESS OF BREATH, CHEST TIGHTNESS OR PAIN) WAKE YOU UP AT NIGHT OR EARLIER THAN USUAL IN THE MORNING: NOT AT ALL
ACT_TOTALSCORE: 25

## 2023-04-25 ASSESSMENT — PAIN SCALES - GENERAL: PAINLEVEL: SEVERE PAIN (7)

## 2023-04-25 NOTE — PROGRESS NOTES
Assessment and Plan:     Abdominal pain, right upper quadrant  Differentials includes GERD, gastritis, pancreatitis, hepatobiliary pathology, appendicitis, epiploic appendagitis or mesenteric adenitis, UTI, ureterolithiasis.  Will obtain hemogram, CMP, lipase, urinalysis.  Will obtain abdominal ultrasound to rule out cholelithiasis.  She is to avoid consuming fatty foods.  Discussed symptomatic treatment with limited use of over-the-counter acetaminophen and ibuprofen.  - CBC with platelets  - Comprehensive metabolic panel (BMP + Alb, Alk Phos, ALT, AST, Total. Bili, TP)  - Lipase  - UA Macro with Reflex to Micro and Culture - lab collect  - US Abdomen Limited    Chronic right shoulder pain  We will refer to orthopedics for further evaluation.  - Orthopedic  Referral        Subjective:     Vicki is a 37 year old female presenting to the clinic for concerns for right upper quadrant abdominal pain which developed 2 months ago.  Pain is a constant ache which fluctuates in intensity.  Patient complains of a burning and stabbing sensation.  This is exacerbated when she consumes large amounts of food.  Pain radiates to her back.  She complains of abdominal bloating.  She occasionally dry heaves.  She denies constipation or diarrhea.  She has noticed some bright red blood in her stool.  She has a history of hemorrhoids in the past.  She has been taking over-the-counter ibuprofen.  She denies alcohol use.  Patient has a history of right shoulder replacement with subsequent chronic pain from this.  She sees Dr. Mathew through [facility] and requires another referral today.      Answers for HPI/ROS submitted by the patient on 4/25/2023  How many servings of fruits and vegetables do you eat daily?: 0-1  On average, how many sweetened beverages do you drink each day (Examples: soda, juice, sweet tea, etc.  Do NOT count diet or artificially sweetened beverages)?: 2  How many minutes a day do you exercise enough  to make your heart beat faster?: 60 or more  How many days a week do you exercise enough to make your heart beat faster?: 7  How many days per week do you miss taking your medication?: 0  What is the reason for your visit today?: LUQ abdominal pain  When did your symptoms begin?: More than a month  What are your symptoms?: Sharp, stabbing abdominal pain.  How would you describe these symptoms?: Moderate  Are your symptoms:: Staying the same  Have you had these symptoms before?: No  Is there anything that makes you feel worse?: Movement  Is there anything that makes you feel better?: Cold water    Reviewof Systems: A complete 14 point review of systems was obtained and is negative or as stated in the history of present illness.    Social History     Socioeconomic History     Marital status: Single     Spouse name: Not on file     Number of children: Not on file     Years of education: Not on file     Highest education level: Not on file   Occupational History     Not on file   Tobacco Use     Smoking status: Former     Smokeless tobacco: Former     Quit date: 9/2/2003     Tobacco comments:     quit smoking 14 yrs ago   Vaping Use     Vaping status: Never Used   Substance and Sexual Activity     Alcohol use: Not Currently     Alcohol/week: 1.0 standard drink of alcohol     Drug use: Yes     Types: Marijuana     Comment: Drug use: recreational     Sexual activity: Not on file   Other Topics Concern     Parent/sibling w/ CABG, MI or angioplasty before 65F 55M? Not Asked   Social History Narrative     Not on file     Social Determinants of Health     Financial Resource Strain: Not on file   Food Insecurity: Not on file   Transportation Needs: Not on file   Physical Activity: Not on file   Stress: Not on file   Social Connections: Not on file   Intimate Partner Violence: Not on file   Housing Stability: Not on file       Active Ambulatory Problems     Diagnosis Date Noted     Anxiety state 05/28/2018     Endometriosis  05/28/2018     Left ovarian cyst 02/13/2018     Major depression, recurrent (H) 05/28/2018     Tension-type headache 05/28/2018     S/P partial hysterectomy 03/09/2015     Neck pain 03/26/2019     Asthma 05/28/2018     Glenohumeral arthritis 06/30/2021     Allergic rhinitis 05/28/2018     Opioid dependence (H) 01/21/2022     Chronic pain 01/21/2022     Localized, primary osteoarthritis of shoulder region 01/21/2022     Long term (current) use of opiate analgesic 01/21/2022     Resolved Ambulatory Problems     Diagnosis Date Noted     Cervical radiculopathy 11/24/2014     Pelvic pain 02/13/2018     Precordial pain 09/16/2020     Hematemesis 03/09/2015     Asthma with exacerbation 05/15/2010     Torticollis 03/26/2019     Tear of right glenoid labrum 12/14/2020     Palpitations 09/25/2020     Abnormal weight loss 01/22/2015     Abnormal blood chemistry 03/26/2019     Shoulder joint pain 01/21/2022     Past Medical History:   Diagnosis Date     Allergic rhinitis, cause unspecified      Anxiety state, unspecified      Arthritis      Cervical disc displacement      Depression      Depressive disorder      Endometriosis, site unspecified      External hemorrhoid      Ganglion cyst of left groin      Groin cyst      Head injury      Heart palpitations      History of anesthesia complications      Major depression      Major depressive disorder, recurrent episode, unspecified      Migraines      PONV (postoperative nausea and vomiting)      Unspecified asthma(493.90)        Family History   Problem Relation Age of Onset     No Known Problems Mother      Lung Cancer Father      Cerebrovascular Disease Father      Brain Hemorrhage Maternal Grandmother      Cancer Maternal Grandmother      Cancer Maternal Grandfather      Brain Hemorrhage Paternal Grandmother      Diabetes Paternal Grandmother        Objective:     /60 (BP Location: Left arm, Patient Position: Sitting, Cuff Size: Adult Regular)   Pulse 83   Temp 98.7  " F (37.1  C) (Temporal)   Resp 16   Ht 1.64 m (5' 4.57\")   Wt 57.7 kg (127 lb 4.8 oz)   SpO2 98%   BMI 21.47 kg/m      Patient is alert, in no obvious distress.   Skin: Warm, dry.    Lungs:  Clear to auscultation. Respirations even and unlabored.  No wheezing or rales noted.   Heart:  Regular rate and rhythm.  No murmurs, S3, S4, gallops, or rubs.    Abdomen: Soft, tenderness to palpation right upper abdomen.  No organomegaly. Bowel sounds normoactive. No guarding or masses noted.                     "

## 2023-04-26 ENCOUNTER — NURSE TRIAGE (OUTPATIENT)
Dept: NURSING | Facility: CLINIC | Age: 38
End: 2023-04-26
Payer: COMMERCIAL

## 2023-04-26 ENCOUNTER — MYC MEDICAL ADVICE (OUTPATIENT)
Dept: FAMILY MEDICINE | Facility: CLINIC | Age: 38
End: 2023-04-26
Payer: COMMERCIAL

## 2023-04-26 DIAGNOSIS — R10.11 ABDOMINAL PAIN, RIGHT UPPER QUADRANT: ICD-10-CM

## 2023-04-26 DIAGNOSIS — R31.9 HEMATURIA, UNSPECIFIED TYPE: Primary | ICD-10-CM

## 2023-04-26 DIAGNOSIS — R10.9 RIGHT FLANK PAIN: ICD-10-CM

## 2023-04-26 NOTE — TELEPHONE ENCOUNTER
Patient called.  She was seen yesterday in clinic.  Patient was started on antibiotics for a UTI today.  Patient is also scheduled for a ct scan for kidney stones, but that is not scheduled til Monday.  Patient states she is in severe pain.    Per the recommendations from the clinic that were written if patient is in severe pain she should go to UC/ED.  Advised that for the patient.    Letty Hernandez RN   04/26/23 4:43 PM  [facility] Nurse Advisor    Reason for Disposition    [1] SEVERE pain (e.g., excruciating) AND [2] no improvement 2 hours after pain medications    Additional Information    Negative: Shock suspected (e.g., cold/pale/clammy skin, too weak to stand, low BP, rapid pulse)    Negative: Sounds like a life-threatening emergency to the triager    Negative: Urinary tract infection suspected, but not taking antibiotics    Negative: [1] Unable to urinate (or only a few drops) > 4 hours AND [2] bladder feels very full (e.g., palpable bladder or strong urge to urinate)    Negative: Passing pure blood or large blood clots (i.e., size > a dime) (Exceptions: flecks, small strands, or pinkish-red color)    Negative: Fever > 103 F (39.4 C)    Negative: Patient sounds very sick or weak to the triager    Protocols used: URINARY TRACT INFECTION ON ANTIBIOTIC FOLLOW-UP CALL - FEMALE-A-AH

## 2023-04-26 NOTE — TELEPHONE ENCOUNTER
Can you please help her schedule the ultrasound if it is not scheduled already?  If her pain is severe, I recommend UC or ER evaluation.  Thanks.

## 2023-04-26 NOTE — TELEPHONE ENCOUNTER
Sophie,  Please advise. Patient informed of auto result release for labs and that once you've reviewed all labs you can provide her with result notes. Looks like she have not scheduled her ultrasound yet as she was just seen yesterday by you.    Violetta Romero, EUNICEN, RN  [facility]

## 2023-04-26 NOTE — TELEPHONE ENCOUNTER
Informed pt of provider's message and she verbalized understanding. Patient stated that she will be going on break in about 10 mins and will call radiology to schedule her CT scan, she has the number to call. She will go to UCC/ER if pain is severe.    EUNICE KeaneN, RN  [facility]

## 2023-04-27 DIAGNOSIS — N20.0 NEPHROLITHIASIS: Primary | ICD-10-CM

## 2023-04-28 LAB — BACTERIA UR CULT: NORMAL

## 2023-05-01 ENCOUNTER — VIRTUAL VISIT (OUTPATIENT)
Dept: UROLOGY | Facility: CLINIC | Age: 38
End: 2023-05-01
Payer: COMMERCIAL

## 2023-05-01 DIAGNOSIS — N20.0 NEPHROLITHIASIS: ICD-10-CM

## 2023-05-01 PROCEDURE — 99204 OFFICE O/P NEW MOD 45 MIN: CPT | Mod: VID | Performed by: UROLOGY

## 2023-05-01 NOTE — PROGRESS NOTES
HPI:  Vicki Pierson is a 37 year old female with right flank pain who presented to the ED.  She was found to have some stones up in her right kidney but no hydro.  She as given macrobid and start cefuroxime X 7 days.  This was started on 4/28/23 but she has not noticed an improvement in her symptoms.    Reviewed previous notes from Dr. Delroy Keenan in the ED on 4/28/23    Exam:  There were no vitals taken for this visit.  GENERAL: Healthy, alert and no distress  EYES: Eyes grossly normal to inspection.  No discharge or erythema, or obvious scleral/conjunctival abnormalities.  RESP: No audible wheeze, cough, or visible cyanosis.  No visible retractions or increased work of breathing.    SKIN: Visible skin clear. No significant rash, abnormal pigmentation or lesions.  NEURO: Cranial nerves grossly intact.  Mentation and speech appropriate for age.  PSYCH: Mentation appears normal, affect normal/bright, judgement and insight intact, normal speech and appearance well-groomed.    Review of Imaging:  The following imaging exams were reviewed by me and discussed with patient:  CT abd/pelvis on 4/27/23  KIDNEYS:   Multiple right midpole and inferior pole renal collecting system stones, with the largest measuring up to 4 mm. No hydronephrosis or ureteral stone. Small right anterior interpolar cyst extending into the hilum measuring up to 11 mm. Additional small hypoattenuating foci in both kidneys which are too small to definitively characterize but are statistically benign. No hydronephrosis of either kidney. No hydroureter or ureteral stone. No suspicious filling defect in either renal collecting system or either ureter. Short segments of nonopacification of both ureters is compatible with peristalsis.     IMPRESSION:   1.  Nonobstructing right renal stones measuring up to 4 mm. No hydronephrosis or hydroureter. No ureteral stone.   2.  No left renal/ureteral stone or hydronephrosis/hydroureter.     CT abd/pelvis on  4/26/23  Right: Multiple, at least 7, clustered calyceal stones within the right kidney individually not exceeding 3 mm. There is mild right renal pelviectasis. No convincing ureterectasis. A 3 mm density within the right pelvis (series 2 image 112) is positioned posteriorly enough to be favored a venous phlebolith rather than ureter but note that the ureter is difficult to discretely identify here. No convincing ureteral stone.     Left: Single tiny nonobstructing calyceal stone. Otherwise unremarkable.     U/s 4/27/23  KIDNEYS:  There appear to be multiple bilateral non-obstructing intrarenal stones with multiple small hyperechoic foci.  No solid masses or hydronephrosis identified.     4/28/23 u/s  IMPRESSION:     Right nephrolithiasis without hydronephrosis. No cholelithiasis or sonographic evidence of acute cholecystitis.      Review of Labs:  The following labs were reviewed by me and discussed with the patient:  Lab Results   Component Value Date    WBC 8.9 04/25/2023     Lab Results   Component Value Date    RBC 4.43 04/25/2023     Lab Results   Component Value Date    HGB 15.0 04/25/2023     Lab Results   Component Value Date    HCT 44.4 04/25/2023     Lab Results   Component Value Date     04/25/2023     Lab Results   Component Value Date    MCH 33.9 04/25/2023     Lab Results   Component Value Date    MCHC 33.8 04/25/2023     Lab Results   Component Value Date    RDW 11.4 04/25/2023     Lab Results   Component Value Date     04/25/2023        Last Comprehensive Metabolic Panel:  Sodium   Date Value Ref Range Status   04/25/2023 143 136 - 145 mmol/L Final     Potassium   Date Value Ref Range Status   04/25/2023 4.6 3.4 - 5.3 mmol/L Final   04/23/2020 4.2 3.5 - 5.0 mmol/L Final     Chloride   Date Value Ref Range Status   04/25/2023 104 98 - 107 mmol/L Final   04/23/2020 108 (H) 98 - 107 mmol/L Final     Carbon Dioxide (CO2)   Date Value Ref Range Status   04/25/2023 25 22 - 29 mmol/L Final    04/23/2020 23 22 - 31 mmol/L Final     Anion Gap   Date Value Ref Range Status   04/25/2023 14 7 - 15 mmol/L Final   04/23/2020 9 5 - 18 mmol/L Final     Glucose   Date Value Ref Range Status   04/25/2023 103 (H) 70 - 99 mg/dL Final   04/23/2020 104 70 - 125 mg/dL Final     Urea Nitrogen   Date Value Ref Range Status   04/25/2023 21.1 (H) 6.0 - 20.0 mg/dL Final   04/23/2020 16 8 - 22 mg/dL Final     Creatinine   Date Value Ref Range Status   04/25/2023 1.12 (H) 0.51 - 0.95 mg/dL Final     GFR Estimate   Date Value Ref Range Status   04/25/2023 64 >60 mL/min/1.73m2 Final     Comment:     eGFR calculated using 2021 CKD-EPI equation.   04/23/2020 >60 >60 mL/min/1.73m2 Final     Calcium   Date Value Ref Range Status   04/25/2023 9.6 8.6 - 10.0 mg/dL Final     Bilirubin Total   Date Value Ref Range Status   04/25/2023 0.5 <=1.2 mg/dL Final     Alkaline Phosphatase   Date Value Ref Range Status   04/25/2023 83 35 - 104 U/L Final     ALT   Date Value Ref Range Status   04/25/2023 14 10 - 35 U/L Final     AST   Date Value Ref Range Status   04/25/2023 20 10 - 35 U/L Final        Color Urine (no units)   Date Value   04/25/2023 Yellow     Appearance Urine (no units)   Date Value   04/25/2023 Clear     Glucose Urine (mg/dL)   Date Value   04/25/2023 Negative     Bilirubin Urine (no units)   Date Value   04/25/2023 Negative     Ketones Urine (mg/dL)   Date Value   04/25/2023 Negative     Specific Gravity Urine (no units)   Date Value   04/25/2023 1.025     pH Urine (no units)   Date Value   04/25/2023 6.0     Protein Albumin Urine (mg/dL)   Date Value   04/25/2023 30 (A)     Urobilinogen Urine (E.U./dL)   Date Value   04/25/2023 0.2     Nitrite Urine (no units)   Date Value   04/25/2023 Negative     Leukocyte Esterase Urine (no units)   Date Value   04/25/2023 Small (A)          Assessment & Plan   36 y/o female with significant pain in the right flank region.  She has some non obstructive stones there but no obstruction or  associated hydro.  She was even treated for a possible infection but does not report relief of her symptoms.  She is understandably frustrated because of the pain that does not seem to go away, however I do not believe that intervention for the small stones would help and may make things worse.   -f/u uzman    Prema Montalvo MD  [facility]      ==========================      Additional Coding Information:    Time spent:  45 minutes spent on the date of the encounter doing chart review, history and exam, documentation and further activities per the note

## 2023-05-01 NOTE — PROGRESS NOTES
Virtual Visit Details    Type of service:  Video Visit   Video Start Time: 11:38 AM  Video End Time:12:00 PM    Originating Location (pt. Location): Home    Distant Location (provider location):  Off-site  Platform used for Video Visit: Isidro

## 2023-05-01 NOTE — NURSING NOTE
Is the patient currently in the state of [place]? YES    Visit mode:VIDEO    If the visit is dropped, the patient can be reconnected by: VIDEO VISIT: Text to cell phone: [phone]    Will anyone else be joining the visit? NO      How would you like to obtain your AVS? MyChart    Are changes needed to the allergy or medication list? NO    Reason for visit: Consult (Kidney stone)    Pt. Reports pain at a level of 7 today.     Lynn May on 5/1/2023 at 11:25 AM

## 2023-05-04 ENCOUNTER — E-VISIT (OUTPATIENT)
Dept: FAMILY MEDICINE | Facility: CLINIC | Age: 38
End: 2023-05-04
Payer: COMMERCIAL

## 2023-05-04 DIAGNOSIS — R10.11 ABDOMINAL PAIN, RIGHT UPPER QUADRANT: Primary | ICD-10-CM

## 2023-05-04 PROCEDURE — 99207 PR NON-BILLABLE SERV PER CHARTING: CPT | Performed by: FAMILY MEDICINE

## 2023-05-04 NOTE — PATIENT INSTRUCTIONS
Thank you for choosing us for your care. I think an in-clinic visit would be best next steps based on your symptoms. Please schedule a clinic appointment; you won t be charged for this eVisit.      You can schedule an appointment right here in [facility], or call [phone]

## 2023-05-10 ENCOUNTER — MYC MEDICAL ADVICE (OUTPATIENT)
Dept: FAMILY MEDICINE | Facility: CLINIC | Age: 38
End: 2023-05-10
Payer: COMMERCIAL

## 2023-05-11 NOTE — TELEPHONE ENCOUNTER
Left message to pt that since writer is not able to get a hold of pt, writer will respond via MyChart and for pt to check her MyChart message.    EUNICE KeaneN, RN  [facility]

## 2023-05-18 ENCOUNTER — MYC MEDICAL ADVICE (OUTPATIENT)
Dept: FAMILY MEDICINE | Facility: CLINIC | Age: 38
End: 2023-05-18
Payer: MEDICAID

## 2023-05-18 DIAGNOSIS — R11.0 NAUSEA: ICD-10-CM

## 2023-05-19 RX ORDER — ONDANSETRON 4 MG/1
TABLET, FILM COATED ORAL
Qty: 20 TABLET | Refills: 1 | Status: SHIPPED | OUTPATIENT
Start: 2023-05-19 | End: 2023-07-13

## 2023-05-19 NOTE — TELEPHONE ENCOUNTER
"Last Written Prescription Date:  4/20/23  Last Fill Quantity: 20,  # refills: 1   Last office visit provider:  4/25/23     Requested Prescriptions   Pending Prescriptions Disp Refills     ondansetron (ZOFRAN) 4 MG tablet 20 tablet 1     Sig: TAKE 1 TABLET(4 MG) BY MOUTH EVERY 12 HOURS AS NEEDED FOR NAUSEA        Antivertigo/Antiemetic Agents Passed - 5/19/2023  2:32 PM        Passed - Recent (12 mo) or future (30 days) visit within the authorizing provider's specialty     Patient has had an office visit with the authorizing provider or a provider within the authorizing providers department within the previous 12 mos or has a future within next 30 days. See \"Patient Info\" tab in inbasket, or \"Choose Columns\" in Meds & Orders section of the refill encounter.              Passed - Medication is active on med list        Passed - Patient is 18 years of age or older             JEFRY REYES RN 05/19/23 2:32 PM  "

## 2023-05-23 ENCOUNTER — TRANSFERRED RECORDS (OUTPATIENT)
Dept: HEALTH INFORMATION MANAGEMENT | Facility: CLINIC | Age: 38
End: 2023-05-23
Payer: MEDICAID

## 2023-06-05 ENCOUNTER — MYC MEDICAL ADVICE (OUTPATIENT)
Dept: FAMILY MEDICINE | Facility: CLINIC | Age: 38
End: 2023-06-05
Payer: MEDICAID

## 2023-06-05 DIAGNOSIS — R10.84 ABDOMINAL PAIN, GENERALIZED: Primary | ICD-10-CM

## 2023-06-05 NOTE — TELEPHONE ENCOUNTER
[employer] message sent to patient with [facility] information.     EUNICE QuachN, RN  [facility]

## 2023-06-05 NOTE — TELEPHONE ENCOUNTER
Writer huddled with PCP who stated patient either needs an appointment in clinic or patient can be seen by gastroenterologist whom PCP can place a referral to.     Writer sent MyChart back to patient with this information.    MONICA Quach, RN  [facility]

## 2023-06-05 NOTE — TELEPHONE ENCOUNTER
"Upon chart review,   As previously instructed by Dr. Montes on 5/23/23 via [employer] message to you:  \"Sophie is out of the office for the remainder of the week.  Please schedule a follow-up office visit with her in person in order to review in more detail and complete additional evaluation and workup as indicated.\"     And again on 5/4/23 for E-Visit:  Thank you for choosing us for your care. I think an in-clinic visit would be best next steps based on your symptoms. Please     On 5/10/23 patient was scheduled with Dr. Aguilar on 5/12/23 and patient was a no show to the appointment.    Writer attempted to call patient and left voicemail to return call to clinic.     If patient returns call please relay messages again per providers and assist with scheduling an in clinic appointment.    Also routing to provider as MNOICA Puente, RN  [facility]    "

## 2023-07-13 ENCOUNTER — MYC MEDICAL ADVICE (OUTPATIENT)
Dept: FAMILY MEDICINE | Facility: CLINIC | Age: 38
End: 2023-07-13
Payer: MEDICAID

## 2023-07-13 DIAGNOSIS — R11.0 NAUSEA: ICD-10-CM

## 2023-07-13 DIAGNOSIS — G89.29 OTHER CHRONIC PAIN: Primary | ICD-10-CM

## 2023-07-13 RX ORDER — OXYCODONE AND ACETAMINOPHEN 5; 325 MG/1; MG/1
1 TABLET ORAL DAILY
Qty: 30 TABLET | Refills: 0 | Status: ON HOLD | OUTPATIENT
Start: 2023-07-13 | End: 2024-07-08

## 2023-07-13 RX ORDER — ONDANSETRON 4 MG/1
TABLET, FILM COATED ORAL
Qty: 20 TABLET | Refills: 1 | Status: SHIPPED | OUTPATIENT
Start: 2023-07-13 | End: 2023-08-11

## 2023-07-13 NOTE — TELEPHONE ENCOUNTER
Writer huddled with PCP and discussed below information.     PCP states she will fill refill this one time, but patient needs to call her insurance to get a form for PCP to complete to allow her to receive prescriptions from this provider and pain clinic.     Writer called patient and left message to return call to clinic and sent a MyChart.    If patient returns call please inform her of this information.    MONICA Quach, RN  [facility]

## 2023-07-13 NOTE — TELEPHONE ENCOUNTER
Writer huddled with PCP due to patient stating she got the Rx figured out. PCP would like to verify with pharmacy that only once script is to be filled and if it went through for the pain clinic provider, to cancel PCP order.     Writer called and spoke to pharmacy. Script has been filled today by Dr. Martinez. Pharmacy canceled prescription for Sophie Gibson NP.     EUNICE QuachN, RN  [facility]

## 2023-07-13 NOTE — TELEPHONE ENCOUNTER
Routing refill request to provider for review/approval because:  Drug not on the [facility] refill protocol- controlled substance and was previously prescribed by elsewhere.    Please fill in with dates, using N/A if not applicable:    Urine Drug Screen in the last 12 months - No    Controlled Substance Agreement in the last 12 months (Must be scanned to the ControlledSubstance Agreement-Opioid document type) See Controlled Substance Agreement Opioid Procedure for workflow- No    PHQ-9 completed in the last 12 months - yes on 9/6/2022    SUSAN-7 completed in the last 12 months- Yes on 5/27/2022    Pending Prescriptions:                       Disp   Refills    oxyCODONE-acetaminophen (PERCOCET) 5-325 *30 tab*0            Sig: Take by mouth daily    Last office visitwith prescribing provider: 4/25/2023   Future Office Visit:  None scheduled.    Violetta Romero, BSN, RN  [facility]

## 2023-07-13 NOTE — TELEPHONE ENCOUNTER
Please call and clarify.  If the prescription is at the pharmacy, the doctor should not be a reason why the prescription is not covered by insurance.  If they need approval from me, we can call her restricted recipient program and agree to have her see this provider.  Thanks.

## 2023-07-13 NOTE — TELEPHONE ENCOUNTER
Called  Insurance regarding this coverage and spoke to Janine     Patient is restricted to Royce Ferreira and Sophie Gibson NP to refill this medication.    PA is good until 05/2024 on this medication.    Routing back to PCP.    EUNICE QuachN, RN  [facility]

## 2023-07-13 NOTE — TELEPHONE ENCOUNTER
Prescription approved per [facility] Refill Protocol.    EUNICE QuachN, RN  [facility]

## 2023-07-24 DIAGNOSIS — M25.50 POLYARTHRALGIA: Primary | ICD-10-CM

## 2023-07-25 ENCOUNTER — TRANSFERRED RECORDS (OUTPATIENT)
Dept: HEALTH INFORMATION MANAGEMENT | Facility: CLINIC | Age: 38
End: 2023-07-25
Payer: MEDICAID

## 2023-08-11 DIAGNOSIS — R11.0 NAUSEA: ICD-10-CM

## 2023-08-11 RX ORDER — ONDANSETRON 4 MG/1
TABLET, FILM COATED ORAL
Qty: 20 TABLET | Refills: 1 | Status: SHIPPED | OUTPATIENT
Start: 2023-08-11 | End: 2023-09-13

## 2023-08-24 ENCOUNTER — TRANSFERRED RECORDS (OUTPATIENT)
Dept: HEALTH INFORMATION MANAGEMENT | Facility: CLINIC | Age: 38
End: 2023-08-24
Payer: MEDICAID

## 2023-08-29 ENCOUNTER — TRANSFERRED RECORDS (OUTPATIENT)
Dept: HEALTH INFORMATION MANAGEMENT | Facility: CLINIC | Age: 38
End: 2023-08-29
Payer: MEDICAID

## 2023-08-30 ENCOUNTER — TRANSFERRED RECORDS (OUTPATIENT)
Dept: HEALTH INFORMATION MANAGEMENT | Facility: CLINIC | Age: 38
End: 2023-08-30
Payer: MEDICAID

## 2023-09-13 DIAGNOSIS — R11.0 NAUSEA: ICD-10-CM

## 2023-09-13 RX ORDER — ONDANSETRON 4 MG/1
TABLET, FILM COATED ORAL
Qty: 20 TABLET | Refills: 1 | Status: SHIPPED | OUTPATIENT
Start: 2023-09-13 | End: 2024-01-30

## 2023-10-20 ENCOUNTER — TELEPHONE (OUTPATIENT)
Dept: FAMILY MEDICINE | Facility: CLINIC | Age: 38
End: 2023-10-20
Payer: MEDICAID

## 2023-10-20 NOTE — TELEPHONE ENCOUNTER
[facility] is needing updated referrals for pain management by the following providers:    Deven Galvin MD, NPI [id number]  Mildred Martinez PA-C, NPI [id number]    Requesting as many visits as possible through next year so a new referral isn't needed. Please send to pt's health plan, [employer]. Pt is scheduled 11/3 for an office visit and 11/10 for an injection. Marked high priority per caller's request.

## 2023-10-22 DIAGNOSIS — M54.12 CERVICAL RADICULOPATHY: ICD-10-CM

## 2023-10-22 DIAGNOSIS — G89.29 OTHER CHRONIC PAIN: Primary | ICD-10-CM

## 2023-10-22 DIAGNOSIS — M54.50 LUMBAR PAIN: ICD-10-CM

## 2023-10-24 ENCOUNTER — TRANSFERRED RECORDS (OUTPATIENT)
Dept: HEALTH INFORMATION MANAGEMENT | Facility: CLINIC | Age: 38
End: 2023-10-24
Payer: MEDICAID

## 2023-10-24 NOTE — TELEPHONE ENCOUNTER
Reached out to Deisy w/[facility], went over request for insurance referral. Let Deisy know that pt/Vicki does not require an insurance referral as she is no longer restricted with [employer] [place], this ended as of 10.2023.    Deisy has noted this on her end and thanked me for the call.    Care type Families [employer] Children/[facility]  Product [employer] [place] PMAP [place]  Product type MEDICAID  Administrative group 1393 - [facility]  Clinic assignment [id number] - [facility]    Member #[id number]  Group #[id number]    Benefit record start date  07/01/2016  Benefit record end date  -    ~Gabbie  [facility] Referral Coordinator

## 2023-11-01 DIAGNOSIS — M54.2 NECK PAIN: Primary | ICD-10-CM

## 2023-11-03 ENCOUNTER — TRANSFERRED RECORDS (OUTPATIENT)
Dept: HEALTH INFORMATION MANAGEMENT | Facility: CLINIC | Age: 38
End: 2023-11-03
Payer: MEDICAID

## 2023-11-14 ENCOUNTER — TRANSFERRED RECORDS (OUTPATIENT)
Dept: HEALTH INFORMATION MANAGEMENT | Facility: CLINIC | Age: 38
End: 2023-11-14
Payer: MEDICAID

## 2023-12-01 ENCOUNTER — E-VISIT (OUTPATIENT)
Dept: FAMILY MEDICINE | Facility: CLINIC | Age: 38
End: 2023-12-01
Payer: COMMERCIAL

## 2023-12-01 DIAGNOSIS — M24.611 ARTHROFIBROSIS OF RIGHT SHOULDER: ICD-10-CM

## 2023-12-01 DIAGNOSIS — R31.9 HEMATURIA, UNSPECIFIED TYPE: ICD-10-CM

## 2023-12-01 DIAGNOSIS — R10.11 ABDOMINAL PAIN, RIGHT UPPER QUADRANT: Primary | ICD-10-CM

## 2023-12-01 PROCEDURE — 99207 PR NON-BILLABLE SERV PER CHARTING: CPT | Performed by: NURSE PRACTITIONER

## 2023-12-01 NOTE — PATIENT INSTRUCTIONS
Thank you for choosing us for your care. I referred you back to the pain clinic for further evaluation.     You can schedule an appointment right here in [facility], or call [phone]

## 2023-12-08 ENCOUNTER — TRANSFERRED RECORDS (OUTPATIENT)
Dept: HEALTH INFORMATION MANAGEMENT | Facility: CLINIC | Age: 38
End: 2023-12-08
Payer: MEDICAID

## 2023-12-15 ENCOUNTER — E-VISIT (OUTPATIENT)
Dept: FAMILY MEDICINE | Facility: CLINIC | Age: 38
End: 2023-12-15
Payer: COMMERCIAL

## 2023-12-15 DIAGNOSIS — B96.89 BACTERIAL VAGINOSIS: Primary | ICD-10-CM

## 2023-12-15 DIAGNOSIS — N76.0 BACTERIAL VAGINOSIS: Primary | ICD-10-CM

## 2023-12-15 PROCEDURE — 99207 PR NON-BILLABLE SERV PER CHARTING: CPT | Performed by: NURSE PRACTITIONER

## 2023-12-15 RX ORDER — METRONIDAZOLE 500 MG/1
500 TABLET ORAL 2 TIMES DAILY
Qty: 14 TABLET | Refills: 0 | Status: SHIPPED | OUTPATIENT
Start: 2023-12-15 | End: 2023-12-22

## 2023-12-15 NOTE — PATIENT INSTRUCTIONS
"Thank you for choosing us for your care. I have placed an order for a prescription so that you can start treatment. View your full visit summary for details by clicking on the link below. Your pharmacist will able to address any questions you may have about the medication.     If you re not feeling better within 2-3 days, please schedule an appointment.  You can schedule an appointment right here in Health system, or call [phone]  If the visit is for the same symptoms as your eVisit, we ll refund the cost of your eVisit if seen within seven days.    Bacterial Vaginosis: Care Instructions  Overview     Bacterial vaginosis is a condition in which there is excess growth of certain bacteria that are normally found in the vagina. Symptoms often include abnormal gray or yellow discharge with a \"fishy\" odor. It is not considered an infection that is spread through sexual contact.  Symptoms can be annoying and uncomfortable. But bacterial vaginosis does not usually cause other health problems. However, in some cases it can lead to more serious issues.  While bacterial vaginosis may go away on its own, most doctors use antibiotics to treat it. You may have been prescribed pills or vaginal cream. With treatment, bacterial vaginosis usually clears up in 5 to 7 days.  Follow-up care is a key part of your treatment and safety. Be sure to make and go to all appointments, and call your doctor if you are having problems. It's also a good idea to know your test results and keep a list of the medicines you take.  How can you care for yourself at home?  Take your antibiotics as directed. Do not stop taking them just because you feel better. You need to take the full course of antibiotics.  Do not eat or drink anything that contains alcohol if you are taking metronidazole or tinidazole.  Keep using your medicine if you start your period. Use pads instead of tampons while using a vaginal cream or suppository. Tampons can absorb the " "medicine.  Wear loose cotton clothing. Do not wear nylon and other materials that hold body heat and moisture close to the skin.  Do not scratch. Relieve itching with a cold pack or a cool bath.  Do not wash your vulva more than once a day. Use plain water or a mild, unscented soap. Do not douche.  When should you call for help?   Call your doctor now or seek immediate medical care if:    You have a fever.     You have new or worse pain in your vagina or pelvis.   Watch closely for changes in your health, and be sure to contact your doctor if:    You have new or worse vaginal itching or discharge.     You have unexpected vaginal bleeding.     You are not getting better as expected.     Your symptoms return after you finish the course of your medicine.   Where can you learn more?  Go to [email or web address]  Enter X360 in the search box to learn more about \"Bacterial Vaginosis: Care Instructions.\"  Current as of: April 19, 2023               Content Version: 13.8    [id number] [employer].   Care instructions adapted under license by your healthcare professional. If you have questions about a medical condition or this instruction, always ask your healthcare professional. [employer] disclaims any warranty or liability for your use of this information.      "

## 2023-12-26 ENCOUNTER — TRANSFERRED RECORDS (OUTPATIENT)
Dept: HEALTH INFORMATION MANAGEMENT | Facility: CLINIC | Age: 38
End: 2023-12-26
Payer: MEDICAID

## 2024-01-19 DIAGNOSIS — J45.20 MILD INTERMITTENT ASTHMA WITHOUT COMPLICATION: ICD-10-CM

## 2024-01-22 RX ORDER — ALBUTEROL SULFATE 90 UG/1
2 AEROSOL, METERED RESPIRATORY (INHALATION) EVERY 4 HOURS PRN
Qty: 18 G | Refills: 0 | Status: SHIPPED | OUTPATIENT
Start: 2024-01-22 | End: 2024-06-10

## 2024-01-30 ENCOUNTER — MYC MEDICAL ADVICE (OUTPATIENT)
Dept: FAMILY MEDICINE | Facility: CLINIC | Age: 39
End: 2024-01-30
Payer: MEDICAID

## 2024-01-30 DIAGNOSIS — R11.0 NAUSEA: ICD-10-CM

## 2024-01-31 RX ORDER — ONDANSETRON 4 MG/1
TABLET, FILM COATED ORAL
Qty: 20 TABLET | Refills: 1 | Status: SHIPPED | OUTPATIENT
Start: 2024-01-31 | End: 2024-05-14

## 2024-02-13 ENCOUNTER — E-VISIT (OUTPATIENT)
Dept: FAMILY MEDICINE | Facility: CLINIC | Age: 39
End: 2024-02-13
Payer: MEDICAID

## 2024-02-13 DIAGNOSIS — M54.2 CERVICAL PAIN: Primary | ICD-10-CM

## 2024-02-13 PROCEDURE — 99421 OL DIG E/M SVC 5-10 MIN: CPT | Performed by: NURSE PRACTITIONER

## 2024-02-13 RX ORDER — METHYLPREDNISOLONE 4 MG
TABLET, DOSE PACK ORAL
Qty: 21 TABLET | Refills: 0 | Status: SHIPPED | OUTPATIENT
Start: 2024-02-13 | End: 2024-06-10

## 2024-02-13 NOTE — PATIENT INSTRUCTIONS
Thank you for choosing us for your care. I have placed an order for a prescription so that you can start treatment. It is a Medrol dose pack which should decrease inflammation and assist with the pain. Considering you have a controlled substance agreement elsewhere, I am not willing to prescribe narcotics. View your full visit summary for details by clicking on the link below. Your pharmacist will able to address any questions you may have about the medication.     If you're not feeling better within 5-7 days, please schedule an appointment.  You can schedule an appointment right here in [facility], or call [phone]  If the visit is for the same symptoms as your eVisit, we'll refund the cost of your eVisit if seen within seven days.

## 2024-05-01 ENCOUNTER — TRANSFERRED RECORDS (OUTPATIENT)
Dept: HEALTH INFORMATION MANAGEMENT | Facility: CLINIC | Age: 39
End: 2024-05-01
Payer: COMMERCIAL

## 2024-05-14 ENCOUNTER — MYC MEDICAL ADVICE (OUTPATIENT)
Dept: FAMILY MEDICINE | Facility: CLINIC | Age: 39
End: 2024-05-14
Payer: COMMERCIAL

## 2024-05-14 DIAGNOSIS — R11.0 NAUSEA: ICD-10-CM

## 2024-05-14 RX ORDER — ONDANSETRON 4 MG/1
TABLET, FILM COATED ORAL
Qty: 20 TABLET | Refills: 1 | Status: SHIPPED | OUTPATIENT
Start: 2024-05-14

## 2024-05-16 DIAGNOSIS — G89.29 CHRONIC NONINTRACTABLE HEADACHE, UNSPECIFIED HEADACHE TYPE: Primary | ICD-10-CM

## 2024-05-16 DIAGNOSIS — R51.9 CHRONIC NONINTRACTABLE HEADACHE, UNSPECIFIED HEADACHE TYPE: Primary | ICD-10-CM

## 2024-05-17 ENCOUNTER — TRANSFERRED RECORDS (OUTPATIENT)
Dept: HEALTH INFORMATION MANAGEMENT | Facility: CLINIC | Age: 39
End: 2024-05-17
Payer: COMMERCIAL

## 2024-06-07 ENCOUNTER — E-VISIT (OUTPATIENT)
Dept: FAMILY MEDICINE | Facility: CLINIC | Age: 39
End: 2024-06-07
Payer: COMMERCIAL

## 2024-06-07 DIAGNOSIS — N76.0 BACTERIAL VAGINOSIS: Primary | ICD-10-CM

## 2024-06-07 DIAGNOSIS — B96.89 BACTERIAL VAGINOSIS: Primary | ICD-10-CM

## 2024-06-07 PROCEDURE — 99421 OL DIG E/M SVC 5-10 MIN: CPT | Performed by: NURSE PRACTITIONER

## 2024-06-07 RX ORDER — METRONIDAZOLE 500 MG/1
500 TABLET ORAL 2 TIMES DAILY
Qty: 14 TABLET | Refills: 0 | Status: SHIPPED | OUTPATIENT
Start: 2024-06-07 | End: 2024-06-14

## 2024-06-07 NOTE — PATIENT INSTRUCTIONS
"Thank you for choosing us for your care. I have placed an order for a prescription so that you can start treatment. View your full visit summary for details by clicking on the link below. Your pharmacist will able to address any questions you may have about the medication.     If you re not feeling better within 2-3 days, please schedule an appointment.  You can schedule an appointment right here in [facility], or call [phone]  If the visit is for the same symptoms as your eVisit, we ll refund the cost of your eVisit if seen within seven days.    Bacterial Vaginosis: Care Instructions  Overview     Bacterial vaginosis is a condition in which there is excess growth of certain bacteria that are normally found in the vagina. Symptoms often include abnormal gray or yellow discharge with a \"fishy\" odor. It is not considered an infection that is spread through sexual contact.  Symptoms can be annoying and uncomfortable. But bacterial vaginosis does not usually cause other health problems. However, in some cases it can lead to more serious issues.  While bacterial vaginosis may go away on its own, most doctors use antibiotics to treat it. You may have been prescribed pills or vaginal cream. With treatment, bacterial vaginosis usually clears up in 5 to 7 days.  Follow-up care is a key part of your treatment and safety. Be sure to make and go to all appointments, and call your doctor if you are having problems. It's also a good idea to know your test results and keep a list of the medicines you take.  How can you care for yourself at home?  Take your antibiotics as directed. Do not stop taking them just because you feel better. You need to take the full course of antibiotics.  Do not eat or drink anything that contains alcohol if you are taking metronidazole or tinidazole.  Keep using your medicine if you start your period. Use pads instead of tampons while using a vaginal cream or suppository. Tampons can absorb the " "medicine.  Wear loose cotton clothing. Do not wear nylon and other materials that hold body heat and moisture close to the skin.  Do not scratch. Relieve itching with a cold pack or a cool bath.  Do not wash your vulva more than once a day. Use plain water or a mild, unscented soap. Do not douche.  When should you call for help?   Call your doctor now or seek immediate medical care if:    You have a fever.     You have new or worse pain in your vagina or pelvis.   Watch closely for changes in your health, and be sure to contact your doctor if:    You have new or worse vaginal itching or discharge.     You have unexpected vaginal bleeding.     You are not getting better as expected.     Your symptoms return after you finish the course of your medicine.   Where can you learn more?  Go to [email or web address]://[email or web address].net/patiented  Enter X360 in the search box to learn more about \"Bacterial Vaginosis: Care Instructions.\"  Current as of: November 27, 2023               Content Version: 14.0    [id number] [employer].   Care instructions adapted under license by your healthcare professional. If you have questions about a medical condition or this instruction, always ask your healthcare professional. [employer] disclaims any warranty or liability for your use of this information.      "

## 2024-06-09 ENCOUNTER — APPOINTMENT (OUTPATIENT)
Dept: CT IMAGING | Facility: CLINIC | Age: 39
End: 2024-06-09
Attending: EMERGENCY MEDICINE
Payer: COMMERCIAL

## 2024-06-09 ENCOUNTER — HOSPITAL ENCOUNTER (EMERGENCY)
Facility: CLINIC | Age: 39
Discharge: HOME | End: 2024-06-09
Attending: EMERGENCY MEDICINE | Admitting: EMERGENCY MEDICINE
Payer: COMMERCIAL

## 2024-06-09 VITALS
HEART RATE: 88 BPM | OXYGEN SATURATION: 99 % | SYSTOLIC BLOOD PRESSURE: 122 MMHG | TEMPERATURE: 98 F | RESPIRATION RATE: 16 BRPM | DIASTOLIC BLOOD PRESSURE: 76 MMHG

## 2024-06-09 DIAGNOSIS — N10 ACUTE PYELONEPHRITIS: ICD-10-CM

## 2024-06-09 LAB
ALBUMIN SERPL BCG-MCNC: 4.6 G/DL (ref 3.5–5.2)
ALBUMIN UR-MCNC: 30 MG/DL
ALP SERPL-CCNC: 85 U/L (ref 40–150)
ALT SERPL W P-5'-P-CCNC: 14 U/L (ref 0–50)
ANION GAP SERPL CALCULATED.3IONS-SCNC: 13 MMOL/L (ref 7–15)
APPEARANCE UR: ABNORMAL
AST SERPL W P-5'-P-CCNC: 14 U/L (ref 0–45)
BACTERIA #/AREA URNS HPF: ABNORMAL /HPF
BASOPHILS # BLD AUTO: 0.1 10E3/UL (ref 0–0.2)
BASOPHILS NFR BLD AUTO: 1 %
BILIRUB SERPL-MCNC: 0.6 MG/DL
BILIRUB UR QL STRIP: NEGATIVE
BUN SERPL-MCNC: 19 MG/DL (ref 6–20)
CALCIUM SERPL-MCNC: 9.2 MG/DL (ref 8.6–10)
CHLORIDE SERPL-SCNC: 104 MMOL/L (ref 98–107)
COLOR UR AUTO: YELLOW
CREAT SERPL-MCNC: 0.99 MG/DL (ref 0.51–0.95)
DEPRECATED HCO3 PLAS-SCNC: 21 MMOL/L (ref 22–29)
EGFRCR SERPLBLD CKD-EPI 2021: 74 ML/MIN/1.73M2
EOSINOPHIL # BLD AUTO: 0.2 10E3/UL (ref 0–0.7)
EOSINOPHIL NFR BLD AUTO: 1 %
ERYTHROCYTE [DISTWIDTH] IN BLOOD BY AUTOMATED COUNT: 11.7 % (ref 10–15)
GLUCOSE SERPL-MCNC: 117 MG/DL (ref 70–99)
GLUCOSE UR STRIP-MCNC: NEGATIVE MG/DL
HCG UR QL: NEGATIVE
HCT VFR BLD AUTO: 45.6 % (ref 35–47)
HGB BLD-MCNC: 15.7 G/DL (ref 11.7–15.7)
HGB UR QL STRIP: ABNORMAL
IMM GRANULOCYTES # BLD: 0 10E3/UL
IMM GRANULOCYTES NFR BLD: 0 %
KETONES UR STRIP-MCNC: NEGATIVE MG/DL
LACTATE SERPL-SCNC: 0.7 MMOL/L (ref 0.7–2)
LEUKOCYTE ESTERASE UR QL STRIP: ABNORMAL
LYMPHOCYTES # BLD AUTO: 2.4 10E3/UL (ref 0.8–5.3)
LYMPHOCYTES NFR BLD AUTO: 20 %
MCH RBC QN AUTO: 33.9 PG (ref 26.5–33)
MCHC RBC AUTO-ENTMCNC: 34.4 G/DL (ref 31.5–36.5)
MCV RBC AUTO: 99 FL (ref 78–100)
MONOCYTES # BLD AUTO: 1.3 10E3/UL (ref 0–1.3)
MONOCYTES NFR BLD AUTO: 11 %
MUCOUS THREADS #/AREA URNS LPF: PRESENT /LPF
NEUTROPHILS # BLD AUTO: 8.1 10E3/UL (ref 1.6–8.3)
NEUTROPHILS NFR BLD AUTO: 67 %
NITRATE UR QL: POSITIVE
NRBC # BLD AUTO: 0 10E3/UL
NRBC BLD AUTO-RTO: 0 /100
PH UR STRIP: 6.5 [PH] (ref 5–7)
PLATELET # BLD AUTO: 218 10E3/UL (ref 150–450)
POTASSIUM SERPL-SCNC: 4.2 MMOL/L (ref 3.4–5.3)
PROT SERPL-MCNC: 7.2 G/DL (ref 6.4–8.3)
RBC # BLD AUTO: 4.63 10E6/UL (ref 3.8–5.2)
RBC URINE: 55 /HPF
SODIUM SERPL-SCNC: 138 MMOL/L (ref 135–145)
SP GR UR STRIP: 1.02 (ref 1–1.03)
SQUAMOUS EPITHELIAL: 6 /HPF
TRANSITIONAL EPI: 1 /HPF
UROBILINOGEN UR STRIP-MCNC: NORMAL MG/DL
WBC # BLD AUTO: 12.1 10E3/UL (ref 4–11)
WBC URINE: 181 /HPF

## 2024-06-09 PROCEDURE — 258N000003 HC RX IP 258 OP 636: Mod: JZ | Performed by: EMERGENCY MEDICINE

## 2024-06-09 PROCEDURE — 87186 SC STD MICRODIL/AGAR DIL: CPT | Performed by: EMERGENCY MEDICINE

## 2024-06-09 PROCEDURE — 81025 URINE PREGNANCY TEST: CPT | Performed by: EMERGENCY MEDICINE

## 2024-06-09 PROCEDURE — 87040 BLOOD CULTURE FOR BACTERIA: CPT | Mod: 91 | Performed by: EMERGENCY MEDICINE

## 2024-06-09 PROCEDURE — 250N000013 HC RX MED GY IP 250 OP 250 PS 637: Performed by: EMERGENCY MEDICINE

## 2024-06-09 PROCEDURE — 96365 THER/PROPH/DIAG IV INF INIT: CPT | Performed by: EMERGENCY MEDICINE

## 2024-06-09 PROCEDURE — 85025 COMPLETE CBC W/AUTO DIFF WBC: CPT | Performed by: EMERGENCY MEDICINE

## 2024-06-09 PROCEDURE — 99285 EMERGENCY DEPT VISIT HI MDM: CPT | Mod: 25 | Performed by: EMERGENCY MEDICINE

## 2024-06-09 PROCEDURE — 99285 EMERGENCY DEPT VISIT HI MDM: CPT | Performed by: EMERGENCY MEDICINE

## 2024-06-09 PROCEDURE — 87086 URINE CULTURE/COLONY COUNT: CPT | Performed by: EMERGENCY MEDICINE

## 2024-06-09 PROCEDURE — 81001 URINALYSIS AUTO W/SCOPE: CPT | Performed by: EMERGENCY MEDICINE

## 2024-06-09 PROCEDURE — 250N000011 HC RX IP 250 OP 636: Mod: JZ | Performed by: EMERGENCY MEDICINE

## 2024-06-09 PROCEDURE — 36415 COLL VENOUS BLD VENIPUNCTURE: CPT | Performed by: EMERGENCY MEDICINE

## 2024-06-09 PROCEDURE — 96375 TX/PRO/DX INJ NEW DRUG ADDON: CPT | Performed by: EMERGENCY MEDICINE

## 2024-06-09 PROCEDURE — 83605 ASSAY OF LACTIC ACID: CPT | Performed by: EMERGENCY MEDICINE

## 2024-06-09 PROCEDURE — 74176 CT ABD & PELVIS W/O CONTRAST: CPT

## 2024-06-09 PROCEDURE — 80053 COMPREHEN METABOLIC PANEL: CPT | Performed by: EMERGENCY MEDICINE

## 2024-06-09 RX ORDER — HYDROMORPHONE HYDROCHLORIDE 1 MG/ML
0.5 INJECTION, SOLUTION INTRAMUSCULAR; INTRAVENOUS; SUBCUTANEOUS
Status: COMPLETED | OUTPATIENT
Start: 2024-06-09 | End: 2024-06-09

## 2024-06-09 RX ORDER — SULFAMETHOXAZOLE/TRIMETHOPRIM 800-160 MG
1 TABLET ORAL ONCE
Status: COMPLETED | OUTPATIENT
Start: 2024-06-09 | End: 2024-06-09

## 2024-06-09 RX ORDER — ONDANSETRON 2 MG/ML
4 INJECTION INTRAMUSCULAR; INTRAVENOUS EVERY 30 MIN PRN
Status: DISCONTINUED | OUTPATIENT
Start: 2024-06-09 | End: 2024-06-09 | Stop reason: HOSPADM

## 2024-06-09 RX ORDER — CEFTRIAXONE 1 G/1
1 INJECTION, POWDER, FOR SOLUTION INTRAMUSCULAR; INTRAVENOUS ONCE
Status: COMPLETED | OUTPATIENT
Start: 2024-06-09 | End: 2024-06-09

## 2024-06-09 RX ORDER — ONDANSETRON 4 MG/1
4 TABLET, ORALLY DISINTEGRATING ORAL EVERY 8 HOURS PRN
Qty: 12 TABLET | Refills: 0 | Status: SHIPPED | OUTPATIENT
Start: 2024-06-09 | End: 2024-08-15

## 2024-06-09 RX ORDER — SULFAMETHOXAZOLE/TRIMETHOPRIM 800-160 MG
1 TABLET ORAL 2 TIMES DAILY
Qty: 19 TABLET | Refills: 0 | Status: SHIPPED | OUTPATIENT
Start: 2024-06-10 | End: 2024-06-20

## 2024-06-09 RX ADMIN — ONDANSETRON 4 MG: 2 INJECTION INTRAMUSCULAR; INTRAVENOUS at 14:47

## 2024-06-09 RX ADMIN — HYDROMORPHONE HYDROCHLORIDE 0.5 MG: 1 INJECTION, SOLUTION INTRAMUSCULAR; INTRAVENOUS; SUBCUTANEOUS at 14:47

## 2024-06-09 RX ADMIN — CEFTRIAXONE SODIUM 1 G: 1 INJECTION, POWDER, FOR SOLUTION INTRAMUSCULAR; INTRAVENOUS at 14:49

## 2024-06-09 RX ADMIN — SODIUM CHLORIDE 1000 ML: 9 INJECTION, SOLUTION INTRAVENOUS at 14:50

## 2024-06-09 RX ADMIN — SULFAMETHOXAZOLE AND TRIMETHOPRIM 1 TABLET: 800; 160 TABLET ORAL at 16:34

## 2024-06-09 ASSESSMENT — COLUMBIA-SUICIDE SEVERITY RATING SCALE - C-SSRS
6. HAVE YOU EVER DONE ANYTHING, STARTED TO DO ANYTHING, OR PREPARED TO DO ANYTHING TO END YOUR LIFE?: NO
2. HAVE YOU ACTUALLY HAD ANY THOUGHTS OF KILLING YOURSELF IN THE PAST MONTH?: NO
1. IN THE PAST MONTH, HAVE YOU WISHED YOU WERE DEAD OR WISHED YOU COULD GO TO SLEEP AND NOT WAKE UP?: NO

## 2024-06-09 ASSESSMENT — ACTIVITIES OF DAILY LIVING (ADL)
ADLS_ACUITY_SCORE: 35

## 2024-06-09 NOTE — ED PROVIDER NOTES
[facility] EMERGENCY DEPARTMENT ([facility])    6/09/24      ED PROVIDER NOTE       History     Chief Complaint   Patient presents with    Flank Pain     R side flank pain      The history is provided by medical records and the patient.     Vicki Pierson is a 38 year old female with a history of asthma, s/p partial hysterectomy (03/09/2015), multiple clustered calyceal stones within the right kidney, and recurrent RUQ abdominal pain who presents to the ED for right sided flank pain. Patient reports severe RUQ abdominal pain lasting four days. She says she has been having this pain for about a year. The pain is most severe when sitting down and results in loss of appetite. She reports nausea, vomiting, and dysuria. She denies coughs and diarrhea. Patient says she had taken 800 mg ibuprofen which has not helped relieve the pain.      Past Medical History  Past Medical History:   Diagnosis Date    Allergic rhinitis, cause unspecified     Created by Conversion     Anxiety state, unspecified     Created by Conversion     Arthritis     Asthma     Cervical disc displacement     Depression     Depressive disorder     Endometriosis     Endometriosis, site unspecified     External hemorrhoid     Ganglion cyst of left groin     Groin cyst     Head injury     Heart palpitations     wore holter monitor for 24 hours    History of anesthesia complications     itchy in recovery    Major depression     Major depressive disorder, recurrent episode, unspecified     Created by Conversion     Migraines     PONV (postoperative nausea and vomiting)     Unspecified asthma(493.90)     Created by Conversion      Past Surgical History:   Procedure Laterality Date    CERVICAL FUSION N/A 11/24/2014    Procedure: Anterior Cervical Decompression Fusion Cervical 6 through Cervical 7 Bilateral;  Surgeon: Joselito Wolfe MD;  Location: [facility];  Service:     CYST REMOVAL      [place]    HEMORRHOIDECTOMY EXTERNAL N/A  07/19/2018    Procedure: HEMORRHOIDECTOMY;  Surgeon: Anette Klein MD;  Location: [facility] OR;  Service:     OOPHORECTOMY Left     SD LAP,DIAGNOSTIC ABDOMEN Left 02/13/2018    Procedure: LAPAROSCOPY LEFT  OOPHORECTOMY;  Surgeon: Letty Floyd DO;  Location: [facility];  Service: Gynecology    SHOULDER SURGERY Right     TUBAL LIGATION      ZZC LIGATE FALLOPIAN TUBE      Description: Tubal Ligation;  Recorded: 11/21/2014;    ZZC TOTAL ABDOM HYSTERECTOMY      Description: Hysterectomy;  Recorded: 11/21/2014;     methylPREDNISolone (MEDROL DOSEPAK) 4 MG tablet therapy pack  metroNIDAZOLE (FLAGYL) 500 MG tablet  ondansetron (ZOFRAN ODT) 4 MG ODT tab  ondansetron (ZOFRAN) 4 MG tablet  oxyCODONE-acetaminophen (PERCOCET) 5-325 MG tablet  [START ON 6/10/2024] sulfamethoxazole-trimethoprim (BACTRIM DS) 800-160 MG tablet  VENTOLIN  (90 Base) MCG/ACT inhaler  cyclobenzaprine (FLEXERIL) 5 MG tablet  hydrOXYzine (ATARAX) 25 MG tablet  ibuprofen (ADVIL/MOTRIN) 800 MG tablet      Allergies   Allergen Reactions    Bee Venom Anaphylaxis    Tramadol Hives    Gabapentin Other (See Comments)     Tolerated 10/2021. Can take a few doses, just not daily.    Ketorolac Other (See Comments)     Other reaction(s): *Unknown  Other reaction(s): *Unknown. Can take a couple doses, just not daily    Ketorolac Tromethamine Other (See Comments)     Other reaction(s): hives, tight chest can't breathe    Meloxicam Other (See Comments)    Nortriptyline Other (See Comments)    Venlafaxine Anxiety and Other (See Comments)     Other reaction(s): Chest Pain  Other reaction(s): Chest Pain     Family History  Family History   Problem Relation Age of Onset    Asthma Mother     Lung Cancer Father     Cerebrovascular Disease Father     Crohn's Disease Father     Kidney Cancer Father     Alcoholism Father     Colon Polyps Father     Asthma Father     Gallbladder Disease Father     Celiac Disease Sister     Gallbladder Disease Sister      Crohn's Disease Sister     Asthma Sister     Brain Hemorrhage Maternal Grandmother     Cancer Maternal Grandmother     Colon Cancer Maternal Grandfather     Brain Hemorrhage Paternal Grandmother     Diabetes Paternal Grandmother     Asthma Son     Asthma Daughter      Social History   Social History     Tobacco Use    Smoking status: Former    Smokeless tobacco: Former     Quit date: 9/2/2003    Tobacco comments:     quit smoking 14 yrs ago   Vaping Use    Vaping status: Never Used   Substance Use Topics    Alcohol use: Not Currently     Alcohol/week: 1.0 standard drink of alcohol    Drug use: Yes     Types: Marijuana     Comment: Drug use: recreational      Past medical history, past surgical history, medications, allergies, family history, and social history were reviewed with the patient. No additional pertinent items.   A complete review of systems was performed with pertinent positives and negatives noted in the HPI, and all other systems negative.    Physical Exam   BP: (!) 140/93  Pulse: 106  Temp: 99.2  F (37.3  C)  Resp: 18  SpO2: 97 %  Physical Exam  Vitals and nursing note reviewed.   Constitutional:       General: She is in acute distress (mild, from pain).      Appearance: She is not ill-appearing, toxic-appearing or diaphoretic.   HENT:      Head: Normocephalic and atraumatic.   Eyes:      Extraocular Movements: Extraocular movements intact.      Conjunctiva/sclera: Conjunctivae normal.   Cardiovascular:      Rate and Rhythm: Tachycardia present.      Heart sounds: Normal heart sounds.   Pulmonary:      Effort: Pulmonary effort is normal. No respiratory distress.      Breath sounds: Normal breath sounds.   Abdominal:      General: There is no distension.      Palpations: Abdomen is soft. There is no mass.      Tenderness: There is no abdominal tenderness. There is right CVA tenderness and guarding. There is no left CVA tenderness or rebound.      Hernia: No hernia is present.   Musculoskeletal:          General: No tenderness. Normal range of motion.      Cervical back: Normal range of motion and neck supple.   Skin:     General: Skin is warm.      Findings: No rash.   Neurological:      General: No focal deficit present.      Mental Status: She is alert and oriented to person, place, and time.   Psychiatric:         Mood and Affect: Mood normal.         Behavior: Behavior normal.         Thought Content: Thought content normal.         Judgment: Judgment normal.           ED Course, Procedures, & Data      Procedures                Results for orders placed or performed during the hospital encounter of 06/09/24   CT Abdomen Pelvis w/o Contrast     Status: None    Narrative    EXAM: CT ABDOMEN PELVIS W/O CONTRAST  LOCATION: [facility]  DATE: 6/9/2024    INDICATION: right flank pain  COMPARISON: None.  TECHNIQUE: CT scan of the abdomen and pelvis was performed without IV contrast. Multiplanar reformats were obtained. Dose reduction techniques were used.  CONTRAST: None.    FINDINGS:   LOWER CHEST: Normal.    HEPATOBILIARY: Liver and gallbladder within normal limits.    PANCREAS: Normal.    SPLEEN: Normal.    ADRENAL GLANDS: Normal.    KIDNEYS/BLADDER: Multiple stones are noted within the right kidney measuring up to 4 mm. No stones noted within the left kidney. No hydronephrosis.    BOWEL: Normal.    LYMPH NODES: Normal.    VASCULATURE: Normal.    PELVIC ORGANS: Bladder within normal limits.    MUSCULOSKELETAL: Degenerative changes of the spine.      Impression    IMPRESSION:   1.  Multiple stones are noted within the right kidney measuring up to 4 mm. No hydronephrosis.     Comprehensive metabolic panel     Status: Abnormal   Result Value Ref Range    Sodium 138 135 - 145 mmol/L    Potassium 4.2 3.4 - 5.3 mmol/L    Carbon Dioxide (CO2) 21 (L) 22 - 29 mmol/L    Anion Gap 13 7 - 15 mmol/L    Urea Nitrogen 19.0 6.0 - 20.0 mg/dL    Creatinine 0.99 (H) 0.51 - 0.95 mg/dL     GFR Estimate 74 >60 mL/min/1.73m2    Calcium 9.2 8.6 - 10.0 mg/dL    Chloride 104 98 - 107 mmol/L    Glucose 117 (H) 70 - 99 mg/dL    Alkaline Phosphatase 85 40 - 150 U/L    AST 14 0 - 45 U/L    ALT 14 0 - 50 U/L    Protein Total 7.2 6.4 - 8.3 g/dL    Albumin 4.6 3.5 - 5.2 g/dL    Bilirubin Total 0.6 <=1.2 mg/dL   UA with Microscopic reflex to Culture     Status: Abnormal    Specimen: Urine, Clean Catch   Result Value Ref Range    Color Urine Yellow Colorless, Straw, Light Yellow, Yellow    Appearance Urine Slightly Cloudy (A) Clear    Glucose Urine Negative Negative mg/dL    Bilirubin Urine Negative Negative    Ketones Urine Negative Negative mg/dL    Specific Gravity Urine 1.023 1.003 - 1.035    Blood Urine Moderate (A) Negative    pH Urine 6.5 5.0 - 7.0    Protein Albumin Urine 30 (A) Negative mg/dL    Urobilinogen Urine Normal Normal, 2.0 mg/dL    Nitrite Urine Positive (A) Negative    Leukocyte Esterase Urine Large (A) Negative    Bacteria Urine Many (A) None Seen /HPF    Mucus Urine Present (A) None Seen /LPF    RBC Urine 55 (H) <=2 /HPF    WBC Urine 181 (H) <=5 /HPF    Squamous Epithelials Urine 6 (H) <=1 /HPF    Transitional Epithelials Urine 1 <=1 /HPF    Narrative    Urine Culture ordered based on laboratory criteria   HCG qualitative urine (UPT)     Status: Normal   Result Value Ref Range    hCG Urine Qualitative Negative Negative   CBC with platelets and differential     Status: Abnormal   Result Value Ref Range    WBC Count 12.1 (H) 4.0 - 11.0 10e3/uL    RBC Count 4.63 3.80 - 5.20 10e6/uL    Hemoglobin 15.7 11.7 - 15.7 g/dL    Hematocrit 45.6 35.0 - 47.0 %    MCV 99 78 - 100 fL    MCH 33.9 (H) 26.5 - 33.0 pg    MCHC 34.4 31.5 - 36.5 g/dL    RDW 11.7 10.0 - 15.0 %    Platelet Count 218 150 - 450 10e3/uL    % Neutrophils 67 %    % Lymphocytes 20 %    % Monocytes 11 %    % Eosinophils 1 %    % Basophils 1 %    % Immature Granulocytes 0 %    NRBCs per 100 WBC 0 <1 /100    Absolute Neutrophils 8.1 1.6 - 8.3  10e3/uL    Absolute Lymphocytes 2.4 0.8 - 5.3 10e3/uL    Absolute Monocytes 1.3 0.0 - 1.3 10e3/uL    Absolute Eosinophils 0.2 0.0 - 0.7 10e3/uL    Absolute Basophils 0.1 0.0 - 0.2 10e3/uL    Absolute Immature Granulocytes 0.0 <=0.4 10e3/uL    Absolute NRBCs 0.0 10e3/uL   Lactic acid whole blood with 1x repeat in 2 hr when >2     Status: Normal   Result Value Ref Range    Lactic Acid, Initial 0.7 0.7 - 2.0 mmol/L   CBC with platelets differential     Status: Abnormal    Narrative    The following orders were created for panel order CBC with platelets differential.  Procedure                               Abnormality         Status                     ---------                               -----------         ------                     CBC with platelets and d...[[id number]]  Abnormal            Final result                 Please view results for these tests on the individual orders.     Medications   ondansetron (ZOFRAN) injection 4 mg (4 mg Intravenous $Given 6/9/24 1447)   pharmacy alert - intermittent dosing (has no administration in time range)   HYDROmorphone (PF) (DILAUDID) injection 0.5 mg (0.5 mg Intravenous $Given 6/9/24 1447)   sodium chloride 0.9% BOLUS 1,000 mL (0 mLs Intravenous Stopped 6/9/24 1615)   cefTRIAXone (ROCEPHIN) 1 g vial to attach to  mL bag for ADULTS or NS 50 mL bag for PEDS (0 g Intravenous Stopped 6/9/24 1617)   sulfamethoxazole-trimethoprim (BACTRIM DS) 800-160 MG per tablet 1 tablet (1 tablet Oral $Given 6/9/24 1634)     Labs Ordered and Resulted from Time of ED Arrival to Time of ED Departure   COMPREHENSIVE METABOLIC PANEL - Abnormal       Result Value    Sodium 138      Potassium 4.2      Carbon Dioxide (CO2) 21 (*)     Anion Gap 13      Urea Nitrogen 19.0      Creatinine 0.99 (*)     GFR Estimate 74      Calcium 9.2      Chloride 104      Glucose 117 (*)     Alkaline Phosphatase 85      AST 14      ALT 14      Protein Total 7.2      Albumin 4.6      Bilirubin Total 0.6      ROUTINE UA WITH MICROSCOPIC REFLEX TO CULTURE - Abnormal    Color Urine Yellow      Appearance Urine Slightly Cloudy (*)     Glucose Urine Negative      Bilirubin Urine Negative      Ketones Urine Negative      Specific Gravity Urine 1.023      Blood Urine Moderate (*)     pH Urine 6.5      Protein Albumin Urine 30 (*)     Urobilinogen Urine Normal      Nitrite Urine Positive (*)     Leukocyte Esterase Urine Large (*)     Bacteria Urine Many (*)     Mucus Urine Present (*)     RBC Urine 55 (*)     WBC Urine 181 (*)     Squamous Epithelials Urine 6 (*)     Transitional Epithelials Urine 1     CBC WITH PLATELETS AND DIFFERENTIAL - Abnormal    WBC Count 12.1 (*)     RBC Count 4.63      Hemoglobin 15.7      Hematocrit 45.6      MCV 99      MCH 33.9 (*)     MCHC 34.4      RDW 11.7      Platelet Count 218      % Neutrophils 67      % Lymphocytes 20      % Monocytes 11      % Eosinophils 1      % Basophils 1      % Immature Granulocytes 0      NRBCs per 100 WBC 0      Absolute Neutrophils 8.1      Absolute Lymphocytes 2.4      Absolute Monocytes 1.3      Absolute Eosinophils 0.2      Absolute Basophils 0.1      Absolute Immature Granulocytes 0.0      Absolute NRBCs 0.0     HCG QUALITATIVE URINE - Normal    hCG Urine Qualitative Negative     LACTIC ACID WHOLE BLOOD WITH 1X REPEAT IN 2 HR WHEN >2 - Normal    Lactic Acid, Initial 0.7     URINE CULTURE   BLOOD CULTURE   BLOOD CULTURE     CT Abdomen Pelvis w/o Contrast   Final Result   IMPRESSION:    1.  Multiple stones are noted within the right kidney measuring up to 4 mm. No hydronephrosis.                Critical care was not performed.     Medical Decision Making  The patient's presentation was of high complexity (an acute health issue posing potential threat to life or bodily function).    The patient's evaluation involved:  review of external note(s) from 1 sources (see separate area of note for details)  review of 3+ test result(s) ordered prior to this encounter (see  separate area of note for details)  ordering and/or review of 3+ test(s) in this encounter (see separate area of note for details)  independent interpretation of testing performed by another health professional (see separate area of note for details)    The patient's management necessitated high risk (a decision regarding hospitalization).    Assessment & Plan    38-year-old woman presenting with right-sided flank pain and nausea/vomiting. Differential diagnosis: Kidney stone, pyelonephritis, sepsis, acute pancreatitis, acute cholecystitis.    After thorough history and physical exam patient appears to be in mild distress due to pain. Will treat her pain with IV Dilaudid and hydrate her with a bolus of intravenous normal saline. I will also treat her nausea with IV Zofran. Will obtain laboratory studies and CT abdomen/pelvis for further diagnostic evaluation. She agrees with the plan.    Patient's urinalysis returned positive for urinary tract infection. Given her symptoms I do suspect that she is she has acute pyelonephritis. She also has some mild leukocytosis of 12,100. Pregnancy test is negative. She will be given a dose of IV ceftriaxone in the emergency department. She agrees with the plan.    I reviewed patient's CT abdomen/pelvis and I read the radiology report; no evidence of obstructing kidney stone.  No evidence of hydronephrosis.  Creatinine is minimally elevated at 0.99.  Patient's symptoms significantly improved after ED treatment.  We discussed possibility of admission versus outpatient management and she chose outpatient management, which I do feel is appropriate given her stable hemodynamics and her clinical picture.  She will be discharged with prescriptions for Bactrim.  She was also given a dose of oral Bactrim in the emergency department.  She will return if her symptoms worsen.    I have reviewed the nursing notes. I have reviewed the findings, diagnosis, plan and need for follow up with the  patient.    Discharge Medication List as of 6/9/2024  4:19 PM        START taking these medications    Details   ondansetron (ZOFRAN ODT) 4 MG ODT tab Take 1 tablet (4 mg) by mouth every 8 hours as needed for nausea, Disp-12 tablet, R-0, Local Print      sulfamethoxazole-trimethoprim (BACTRIM DS) 800-160 MG tablet Take 1 tablet by mouth 2 times daily for 10 days, Disp-19 tablet, R-0, Local PrintFirst dose given in the emergency department on June 9, 2024 at 4:30 PM.             Final diagnoses:   Acute pyelonephritis   Opal THOMPSON, am serving as a trained medical scribe to document services personally performed by Kemar Begum MD, MD, based on the provider's statements to me.     Shy THOMPSON Nikola, MD, MD, was physically present and have reviewed and verified the accuracy of this note documented by Opal Agrawal.     Kemar Begum MD  [facility] EMERGENCY DEPARTMENT  6/9/2024     Kemar Begum MD  06/09/24 [id number]

## 2024-06-09 NOTE — DISCHARGE INSTRUCTIONS
Please make an appointment to follow up with Your Primary Care Provider in 2 days for further evaluation and recommendations.  Please take the prescribed antibiotic as instructed.  Please utilize Tylenol and ibuprofen for pain, if needed.  If your symptoms significantly worsen or you develop a fever 100.4  F or higher please come back to the emergency department.

## 2024-06-10 ENCOUNTER — PATIENT OUTREACH (OUTPATIENT)
Dept: FAMILY MEDICINE | Facility: CLINIC | Age: 39
End: 2024-06-10
Payer: COMMERCIAL

## 2024-06-10 DIAGNOSIS — J45.20 MILD INTERMITTENT ASTHMA WITHOUT COMPLICATION: ICD-10-CM

## 2024-06-10 DIAGNOSIS — N80.9 ENDOMETRIOSIS: Primary | ICD-10-CM

## 2024-06-10 DIAGNOSIS — F41.1 ANXIETY STATE: ICD-10-CM

## 2024-06-10 RX ORDER — HYDROXYZINE HYDROCHLORIDE 25 MG/1
25-50 TABLET, FILM COATED ORAL EVERY 6 HOURS PRN
Qty: 90 TABLET | Refills: 1 | Status: SHIPPED | OUTPATIENT
Start: 2024-06-10 | End: 2024-08-15

## 2024-06-10 RX ORDER — ALBUTEROL SULFATE 90 UG/1
2 AEROSOL, METERED RESPIRATORY (INHALATION) EVERY 4 HOURS PRN
Qty: 18 G | Refills: 0 | Status: SHIPPED | OUTPATIENT
Start: 2024-06-10

## 2024-06-10 NOTE — TELEPHONE ENCOUNTER
Transitions of Care Outreach  Chief Complaint   Patient presents with    Hospital F/U       Most Recent Admission Date: 6/9/2024   Most Recent Admission Diagnosis:      Most Recent Discharge Date: 6/9/2024   Most Recent Discharge Diagnosis: Acute pyelonephritis - N10     Transitions of Care Assessment    Discharge Assessment  How are you doing now that you are home?: in pain and can't eat  How are your symptoms? (Red Flag symptoms escalate to triage hotline per guidelines): Unchanged  Do you know how to contact your clinic care team if you have future questions or changes to your health status? : Yes  Does the patient have their discharge instructions? : Yes  Does the patient have questions regarding their discharge instructions? : No  Were you started on any new medications or were there changes to any of your previous medications? : Yes  Does the patient have all of their medications?: No (see comment) (going to  today)  Do you have questions regarding any of your medications? : No  Do you have all of your needed medical supplies or equipment (DME)?  (i.e. oxygen tank, CPAP, cane, etc.): Yes    Follow up Plan     Discharge Follow-Up  Discharge follow up appointment scheduled in alignment with recommended follow up timeframe or Transitions of Risk Category? (Low = within 30 days; Moderate= within 14 days; High= within 7 days): Yes    No future appointments.    Outpatient Plan as outlined on AVS reviewed with patient.    For any urgent concerns, please contact our 24 hour nurse triage line: [phone] ([phone])       Mya Lozada RN

## 2024-06-11 ENCOUNTER — TELEPHONE (OUTPATIENT)
Dept: NURSING | Facility: CLINIC | Age: 39
End: 2024-06-11
Payer: COMMERCIAL

## 2024-06-11 ENCOUNTER — MYC MEDICAL ADVICE (OUTPATIENT)
Dept: FAMILY MEDICINE | Facility: CLINIC | Age: 39
End: 2024-06-11
Payer: COMMERCIAL

## 2024-06-11 PROBLEM — D80.2 IMMUNOGLOBULIN A DEFICIENCY (H): Status: ACTIVE | Noted: 2018-05-22

## 2024-06-11 PROBLEM — K31.9 DISORDER OF STOMACH: Status: ACTIVE | Noted: 2023-09-01

## 2024-06-11 PROBLEM — G47.00 INSOMNIA: Status: ACTIVE | Noted: 2023-03-21

## 2024-06-11 PROBLEM — G90.511 COMPLEX REGIONAL PAIN SYNDROME I OF RIGHT UPPER LIMB: Status: ACTIVE | Noted: 2024-06-11

## 2024-06-11 PROBLEM — R10.11 RIGHT UPPER QUADRANT PAIN: Status: ACTIVE | Noted: 2024-06-11

## 2024-06-11 PROBLEM — N20.0 KIDNEY STONE: Status: ACTIVE | Noted: 2023-04-27

## 2024-06-11 PROBLEM — R10.13 EPIGASTRIC PAIN: Status: ACTIVE | Noted: 2018-05-18

## 2024-06-11 LAB — BACTERIA UR CULT: ABNORMAL

## 2024-06-11 RX ORDER — RIZATRIPTAN BENZOATE 10 MG/1
TABLET, ORALLY DISINTEGRATING ORAL
COMMUNITY
Start: 2024-05-20

## 2024-06-11 NOTE — LETTER
June 11, 2024      Vicki NATHAN Pierson  [address] [place] [place] [address]        To Whom It May Concern:    Vicki Pierson reports recent illness with ER assessment completed 6/9/24.  Please excuse her from work from 6/10/24 through 6/11/24 due to illness.      Sincerely,        Ankush Montes MD

## 2024-06-11 NOTE — TELEPHONE ENCOUNTER
[facility] ([facility])    Reason for call: Lab Result Notification     Lab Result (including Rx patient on, if applicable).  If culture, copy of lab report at bottom.  Lab Result: Urine Culture  6/9/24 Sulfamethoxazole-trimethoprim (BACTRIM DS) 800-160 MG tablet Take 1 tablet by mouth 2 times daily for 10 days - Oral   Creatinine Level (mg/dl)   Creatinine   Date Value Ref Range Status   06/09/2024 0.99 (H) 0.51 - 0.95 mg/dL Final    Creatinine clearance (ml/min), if applicable    Creatinine clearance cannot be calculated (Unknown ideal weight.)     Patient's current Symptoms:   Pt states she has been home for the past couple of days, right side hurts pretty bad, and working to get an appt with MD/PCP however no appts available.  Ibuprofen is not helping the pain.  Pt states right flank pain is not worse.  No vomiting yesterday, no vomiting today.  Pt states a little lightheaded.  Pt states she does not feel worse.  Pt intends to go to [facility] [place] [place] now.    RN Recommendations/Instructions per [place] ED lab result protocol:   [facility] ED lab result protocol utilized: Urine Culture    Patient/care giver notified to contact your PCP clinic or return to the Emergency department if your:  Symptoms return.  Symptoms do not improve after 3 days on antibiotic.  Symptoms do not resolve after completing antibiotic.  Symptoms worsen or other concerning symptoms.    Belem Donald RN

## 2024-06-12 ENCOUNTER — MYC MEDICAL ADVICE (OUTPATIENT)
Dept: FAMILY MEDICINE | Facility: CLINIC | Age: 39
End: 2024-06-12

## 2024-06-12 ENCOUNTER — OFFICE VISIT (OUTPATIENT)
Dept: FAMILY MEDICINE | Facility: CLINIC | Age: 39
End: 2024-06-12
Payer: COMMERCIAL

## 2024-06-12 VITALS
HEIGHT: 65 IN | OXYGEN SATURATION: 97 % | RESPIRATION RATE: 14 BRPM | SYSTOLIC BLOOD PRESSURE: 123 MMHG | DIASTOLIC BLOOD PRESSURE: 79 MMHG | WEIGHT: 130 LBS | TEMPERATURE: 98.8 F | BODY MASS INDEX: 21.66 KG/M2 | HEART RATE: 87 BPM

## 2024-06-12 DIAGNOSIS — R10.11 ABDOMINAL PAIN, RIGHT UPPER QUADRANT: Primary | ICD-10-CM

## 2024-06-12 PROCEDURE — 99213 OFFICE O/P EST LOW 20 MIN: CPT | Performed by: FAMILY MEDICINE

## 2024-06-12 ASSESSMENT — ASTHMA QUESTIONNAIRES
QUESTION_4 LAST FOUR WEEKS HOW OFTEN HAVE YOU USED YOUR RESCUE INHALER OR NEBULIZER MEDICATION (SUCH AS ALBUTEROL): NOT AT ALL
QUESTION_2 LAST FOUR WEEKS HOW OFTEN HAVE YOU HAD SHORTNESS OF BREATH: NOT AT ALL
QUESTION_5 LAST FOUR WEEKS HOW WOULD YOU RATE YOUR ASTHMA CONTROL: COMPLETELY CONTROLLED
ACT_TOTALSCORE: 25
QUESTION_3 LAST FOUR WEEKS HOW OFTEN DID YOUR ASTHMA SYMPTOMS (WHEEZING, COUGHING, SHORTNESS OF BREATH, CHEST TIGHTNESS OR PAIN) WAKE YOU UP AT NIGHT OR EARLIER THAN USUAL IN THE MORNING: NOT AT ALL
QUESTION_1 LAST FOUR WEEKS HOW MUCH OF THE TIME DID YOUR ASTHMA KEEP YOU FROM GETTING AS MUCH DONE AT WORK, SCHOOL OR AT HOME: NONE OF THE TIME
ACT_TOTALSCORE: 25

## 2024-06-12 ASSESSMENT — PATIENT HEALTH QUESTIONNAIRE - PHQ9
SUM OF ALL RESPONSES TO PHQ QUESTIONS 1-9: 5
10. IF YOU CHECKED OFF ANY PROBLEMS, HOW DIFFICULT HAVE THESE PROBLEMS MADE IT FOR YOU TO DO YOUR WORK, TAKE CARE OF THINGS AT HOME, OR GET ALONG WITH OTHER PEOPLE: NOT DIFFICULT AT ALL
SUM OF ALL RESPONSES TO PHQ QUESTIONS 1-9: 5

## 2024-06-12 ASSESSMENT — PAIN SCALES - GENERAL: PAINLEVEL: SEVERE PAIN (7)

## 2024-06-12 NOTE — PROGRESS NOTES
Assessment & Plan     Abdominal pain, right upper quadrant  Referral will be made follow-up at preoperative examination if indicated  - Adult General Surg Referral; Future        MED REC REQUIRED    Post Medication Reconciliation Status:         Connor Cohen is a 38 year old, presenting for the following health issues:  Hospital F/U (ER Visit Right side kidney pain on and off started April 2023)      6/12/2024     7:59 AM   Additional Questions   Roomed by Ashley GUERRERO I am seeing this patient for the first time who has been complaining of moderately severe right upper quadrant/lateral right upper quadrant discomfort which she rates as an 8 out of 10 on especially with movement now accompanied by some nausea.  She has been extensively worked up in the past for this she does have some ureteral liths  which have been seen by urologic surgery but they are small.  History of left ovarian cyst history of endometriosis hysterectomy in the past.  She has had 3 laparoscopic procedures 2 in the right side 1 on the left side with confirmatory evidence by port scars.  History of depression.  History of chronic pain.  History of pain management.  Scheduled for cervical radiculopathy dissection relief in 3 weeks.  I have reviewed her chart here.  She is not on antidepressants.  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   3 boys all active.  My impression is that she could use a general surgery consultation to consider if the patient would have some type of adhesive disease causing this type of chronic pain.  Consideration would be for repeat laparoscopy.  Imaging may need to be repeated.  Patient agrees and would appreciate a general surgery consultation.  No medications were prescribed today.  She will follow-up here at [facility] for preoperative examination in 3 weeks.  We wish her well and our surgical colleagues consideration.  Face-to-face time new patient and non-face-to-face time 30  "minutes.            Review of Systems  CONSTITUTIONAL: NEGATIVE for fever, chills, change in weight  INTEGUMENTARY/SKIN: NEGATIVE for worrisome rashes, moles or lesions  EYES: NEGATIVE for vision changes or irritation  ENT/MOUTH: NEGATIVE for ear, mouth and throat problems  RESP: NEGATIVE for significant cough or SOB  BREAST: NEGATIVE for masses, tenderness or discharge  CV: NEGATIVE for chest pain, palpitations or peripheral edema  GI: NEGATIVE for nausea, abdominal pain, heartburn, or change in bowel habits  : NEGATIVE for frequency, dysuria, or hematuria  MUSCULOSKELETAL: NEGATIVE for significant arthralgias or myalgia  NEURO: NEGATIVE for weakness, dizziness or paresthesias  ENDOCRINE: NEGATIVE for temperature intolerance, skin/hair changes  HEME: NEGATIVE for bleeding problems  PSYCHIATRIC: NEGATIVE for changes in mood or affect      Objective    /79   Pulse 87   Temp 98.8  F (37.1  C)   Resp 14   Ht 1.651 m (5' 5\")   Wt 59 kg (130 lb)   SpO2 97%   Breastfeeding No   BMI 21.63 kg/m    Body mass index is 21.63 kg/m .  Physical Exam   GENERAL: alert and no distress  NECK: no adenopathy, no asymmetry, masses, or scars  RESP: lungs clear to auscultation - no rales, rhonchi or wheezes  CV: regular rate and rhythm, normal S1 S2, no S3 or S4, no murmur, click or rub, no peripheral edema  ABDOMEN: Bowel sounds are heard the patient guards in the right upper right lateral quadrant area there is no rebound she has difficulty sitting up and laying back down I do not detect any neurological deficits despite the severe guarding  MS: no gross musculoskeletal defects noted, no edema            Signed Electronically by: Brandon Alcala MD    Answers submitted by the patient for this visit:  Patient Health Questionnaire (Submitted on 6/12/2024)  If you checked off any problems, how difficult have these problems made it for you to do your work, take care of things at home, or get along with other people?: Not " difficult at all  PHQ9 TOTAL SCORE: 5

## 2024-06-12 NOTE — LETTER
June 12, 2024      Vicki Pierson  [address] [address]        To Whom It May Concern:    Vicki EVANS Kenna  was seen on 6/12/24.  Please excuse her due to Medical Appointment.        Sincerely,        Brandon Alcala MD

## 2024-06-14 LAB
BACTERIA BLD CULT: NO GROWTH
BACTERIA BLD CULT: NO GROWTH

## 2024-06-22 ENCOUNTER — HEALTH MAINTENANCE LETTER (OUTPATIENT)
Age: 39
End: 2024-06-22

## 2024-06-25 ENCOUNTER — PATIENT OUTREACH (OUTPATIENT)
Dept: SURGERY | Facility: CLINIC | Age: 39
End: 2024-06-25
Payer: COMMERCIAL

## 2024-06-25 ENCOUNTER — NURSE TRIAGE (OUTPATIENT)
Dept: NURSING | Facility: CLINIC | Age: 39
End: 2024-06-25
Payer: COMMERCIAL

## 2024-06-25 DIAGNOSIS — R10.84 ABDOMINAL PAIN, GENERALIZED: ICD-10-CM

## 2024-06-25 DIAGNOSIS — G89.29 OTHER CHRONIC PAIN: Primary | ICD-10-CM

## 2024-06-25 DIAGNOSIS — R10.11 ABDOMINAL PAIN, RIGHT UPPER QUADRANT: Primary | ICD-10-CM

## 2024-06-25 NOTE — TELEPHONE ENCOUNTER
"Nurse Triage SBAR    Is this a 2nd Level Triage? YES, LICENSED PRACTITIONER REVIEW IS REQUIRED    Situation:  Referral from Primary Care visit 6/12/24:  Referred by / LVD: 6/12/2024  [facility]  Extreme abdominal pain- patient left call before transfer was completed.  Called back 10:56 and spoke with patient.   -She is concerned she has an appendicitis.  -She has been having bad abd pain since April but is now acutely much worse 10/10 > difficulty standing on right leg, Nausea, vomiting, chills noted.  -\"Pain is beyond excruciating, can't walk on right leg.\"     Per PCP note 6/12/24:  \"moderately severe right upper quadrant/lateral right upper quadrant discomfort which she rates as an 8 out of 10 on especially with movement now accompanied by some nausea. \"  Background: Per chart review 6/12/24> plan of care \"My impression is that she could use a general surgery consultation to consider if the patient would have some type of adhesive disease causing this type of chronic pain. Consideration would be for repeat laparoscopy. Imaging may need to be repeated. Patient agrees and would appreciate a general surgery consultation.\"   ED [facility] 6/9/24: CT abd 6/9/24 and CMP, CBC Blood cx done 6/9/24    Assessment: Severe upper quadrant abd pain in patient with chronic abd pain and acutely worsening symptoms since 6/12/24 and fever/ nausea/ vomiting.     Protocol Recommended Disposition:   ED NOW- patient agrees, has ride.    Recommendation:Patient has initial General Surgery consult appt 7/12/24. Next PCP 7/24/24    Routed to provider       Reason for Disposition   SEVERE abdominal pain (e.g., excruciating)    Additional Information   Negative: Passed out (i.e., fainted, collapsed and was not responding)   Negative: Shock suspected (e.g., cold/pale/clammy skin, too weak to stand, low BP, rapid pulse)   Negative: Sounds like a life-threatening emergency to the triager   Negative: Pain is mainly in " upper abdomen (if needed ask: 'is it mainly above the belly button?')    Protocols used: Abdominal Pain - Female-A-[place]

## 2024-06-25 NOTE — PROGRESS NOTES
Patient is scheduled to consult with Dr. Pantoja on 7/12 regarding ongoing RUQ pain. The patient has underwent recent CT and labs which did not show any gallbladder abnormalities.  CE reviewed and imaging reviewed. No HIDA scan found but found previous notation that it had been scheduled.    Attempted to reach the patient with no answer. LM on VM to call office to discuss above. If HIDA has not been done, requesting that it be completed prior to this visit and should be coordinated through her PCP/referring provider.

## 2024-06-27 NOTE — TELEPHONE ENCOUNTER
REFERRAL INFORMATION:  Referring Provider:  Dr. Alcala  Referring Clinic:  [facility]  Reason for Visit/Diagnosis: Abdominal pain, right upper quadrant        FUTURE VISIT INFORMATION:  Appointment Date: 7/3/2024  Appointment Time: 8:30 AM     NOTES RECORD STATUS  DETAILS   OFFICE NOTE from Referring Provider Internal [facility]  [facility]:  24 - PCC OV with Dr. Alcala   OFFICE NOTE from Other Specialists Care Everywhere / Internal [facility]:  23 - UC OV with Dr. Magan Charles  [facility]:  23 - PCC OV with Sophie Gibson NP   HOSPITAL DISCHARGE SUMMARY/ ED VISITS  Care Everywhere / Internal [facility]:  24 - ED OV with Dr. Begum    [facility]:  10/22/23 - ED OV with Dr. Wood  23 - ED OV with Dr. Patterson  23 - ED OV with Dr. Keenan  23 - ED OV with Dr. Roldan   OPERATIVE REPORT N/A    PERTINENT LABS Care Everywhere / Internal    IMAGING (CT, MRI, US, XR)  Received / Internal [facility]:  24 - NM HIDA    [facility]:  24 - CT Abd/Pelvis    [facility]:  23- US Abdomen    [facility] - [facility], 23 - US Abdomen  23 - CT Abd/pelvis    [facility]:  23 - CT Abd/pelvis     Records Requested    Facility  [facility]  Fax: [phone]  [facility]  Fax: [phone]  [facility]  Fax: [phone]   Outcome * 24 11:11 AM Faxed urgent request to [facility] and [facility] for images to be pushed to [facility]. - Jihan    * 24 6:39 AM Images received from [facility], [facility], and [facility] and attached to the patient in PACs. - Jihan    * 24 7:04 AM Faxed urgent request to [facility] for images to be pushed to [facility]. - Jihan    * 24 9:11 AM Imaging received from [facility] and sent to [facility] to be scanned into the chart and attached to the patient in PACs. - Jihan

## 2024-07-01 ENCOUNTER — TRANSFERRED RECORDS (OUTPATIENT)
Dept: HEALTH INFORMATION MANAGEMENT | Facility: CLINIC | Age: 39
End: 2024-07-01
Payer: COMMERCIAL

## 2024-07-03 ENCOUNTER — OFFICE VISIT (OUTPATIENT)
Dept: SURGERY | Facility: CLINIC | Age: 39
End: 2024-07-03
Payer: COMMERCIAL

## 2024-07-03 ENCOUNTER — PRE VISIT (OUTPATIENT)
Dept: SURGERY | Facility: CLINIC | Age: 39
End: 2024-07-03
Payer: COMMERCIAL

## 2024-07-03 ENCOUNTER — TELEPHONE (OUTPATIENT)
Dept: SURGERY | Facility: CLINIC | Age: 39
End: 2024-07-03

## 2024-07-03 VITALS
OXYGEN SATURATION: 99 % | HEART RATE: 80 BPM | HEIGHT: 65 IN | BODY MASS INDEX: 21.84 KG/M2 | DIASTOLIC BLOOD PRESSURE: 75 MMHG | SYSTOLIC BLOOD PRESSURE: 111 MMHG | WEIGHT: 131.1 LBS

## 2024-07-03 DIAGNOSIS — R10.11 ABDOMINAL PAIN, RIGHT UPPER QUADRANT: ICD-10-CM

## 2024-07-03 DIAGNOSIS — K82.8 BILIARY DYSKINESIA: Primary | ICD-10-CM

## 2024-07-03 PROCEDURE — 99204 OFFICE O/P NEW MOD 45 MIN: CPT | Performed by: SURGERY

## 2024-07-03 RX ORDER — CEFAZOLIN SODIUM 2 G/50ML
2 SOLUTION INTRAVENOUS
Status: CANCELLED | OUTPATIENT
Start: 2024-07-03

## 2024-07-03 RX ORDER — INDOCYANINE GREEN AND WATER 25 MG
2.5 KIT INJECTION ONCE
Status: CANCELLED | OUTPATIENT
Start: 2024-07-03 | End: 2024-07-03

## 2024-07-03 RX ORDER — CEFAZOLIN SODIUM 2 G/50ML
2 SOLUTION INTRAVENOUS SEE ADMIN INSTRUCTIONS
Status: CANCELLED | OUTPATIENT
Start: 2024-07-03

## 2024-07-03 ASSESSMENT — PAIN SCALES - GENERAL: PAINLEVEL: EXTREME PAIN (8)

## 2024-07-03 NOTE — PROGRESS NOTES
Sophie Gibson  [address]  [facility] [address]    MATT RAINES    Patient seen in consultation for biliary dyskinesia by Sophie Gibson      I had the pleasure of seeing Matt Raines in my office on 7/3/2024 for evaluation.    CC:   Chief Complaint   Patient presents with    New Patient     Abdominal pain, RUQ     Today diagnosis (K82.8) Biliary dyskinesia  (primary encounter diagnosis)  Comment:   Plan: Case Request: CHOLECYSTECTOMY, LAPAROSCOPIC    (R10.11) Abdominal pain, right upper quadrant  Biliary dyskinesia  The patient was thoroughly counseled regarding their Biliary dyskinesia [K82.8].     The patient was informed that the proposed procedure or medical intervention involves removal of the gallbladder laparoscopically (with small incisions and camera) possibly open and does offer a very good likelihood of symptom relief.     The patient was made aware of the risks of the procedure, including but not limited to:    bleeding, conversion to open, bile leak, bile duct injury or other adjacent organ injury, retained gallstones, cardiac or pulmonary related complications and anesthesia complications. Also that difficulties may be encountered during recovery to include: wound or deep intraabdominal infection, wound dehiscence, incisional hernia, bile leak or retained stone requiring ERCP.     The patient was also counseled on the fact that cholecystectomy has ~30-40% chance of not relieving her pain/symptoms associated with biliary dyskinesia.    In the course of the evaluation we did discuss other therapeutic options with the patient, including antibiotics and/or drainage. The risks and benefits of these options were also discussed which include but are not limited to: recurrent worsening episodes.     Also discussed were possible problems or difficulties the patient may encounter if treatment was not pursued at this time. These include: worsening episodes, cholangitis and/or pancreatitis.      The patient was informed that Live Luna DO will be primarily responsible for the procedure. Assistance during the procedure and during hospitalization may also be provided by other physicians, nurses and technicians.     The patient was also informed that if exposure to the patient s blood or body fluids occurs during the procedure, HIV testing of the patient will occur unless they refuse at this time. Risk of blood transfusion is minimal.     The patient will be provided additional education resources by the support staff. If there are ever any questions regarding their diagnosis or the procedure, the patient is encouraged to ask.     All of the patient s or their legal representative s questions have been answered to their satisfaction and they have indicated a clear understanding of this discussion.   Vicki expressed understanding of risks, benefits and alternatives and wished to proceed.     All findings, test results, and diagnosis were discussed with the patient. Vicki  participated in the decision making process and agreed with the plan of care. Questions were sought and answered.           Patient cleared for surgery with no additional risks identified.     HPI: 38-year-old female referred to clinic for evaluation of biliary dyskinesia and right upper quadrant abdominal pain.  Patient states she has had a longstanding history of abdominal pain associated with episodes of nausea that is worsened with fatty and greasy foods.  She rates the pain as 6-8 out of 10, sharp, cramping in nature.  She reports being evaluated by GI and undergoing endoscopy with no ulcers or dysmotility issues identified.  She does report large amount of NSAID use for pain control.  CT scan shows no intra-abdominal pathology that could be explaining her pain.  Ultrasound performed at outside facility showed normal gallbladder with no stones.  HIDA scan performed that showed reduced ejection fraction and symptoms  were reproducible with administration of CCK.  Nothing seems to make the pain better other than time.    PAST MEDICAL HISTORY:  Past Medical History:   Diagnosis Date    Allergic rhinitis, cause unspecified     Created by Conversion     Anxiety state, unspecified     Created by Conversion     Arthritis     Asthma     Cervical disc displacement     Depression     Depressive disorder     Endometriosis     Endometriosis, site unspecified     External hemorrhoid     Ganglion cyst of left groin     Groin cyst     Head injury     Heart palpitations     wore holter monitor for 24 hours    History of anesthesia complications     itchy in recovery    Major depression     Major depressive disorder, recurrent episode, unspecified     Created by Conversion     Migraines     PONV (postoperative nausea and vomiting)     Unspecified asthma(493.90)     Created by Conversion        PAST SURGICAL HISTORY:  Past Surgical History:   Procedure Laterality Date    CERVICAL FUSION N/A 11/24/2014    Procedure: Anterior Cervical Decompression Fusion Cervical 6 through Cervical 7 Bilateral;  Surgeon: Joselito Wolfe MD;  Location: [facility];  Service:     CYST REMOVAL      [place]    HEMORRHOIDECTOMY EXTERNAL N/A 07/19/2018    Procedure: HEMORRHOIDECTOMY;  Surgeon: Anette Klein MD;  Location: [facility];  Service:     OOPHORECTOMY Left     NC LAP,DIAGNOSTIC ABDOMEN Left 02/13/2018    Procedure: LAPAROSCOPY LEFT  OOPHORECTOMY;  Surgeon: Letty Floyd DO;  Location: [facility];  Service: Gynecology    SHOULDER SURGERY Right     TUBAL LIGATION      ZZC LIGATE FALLOPIAN TUBE      Description: Tubal Ligation;  Recorded: 11/21/2014;    ZZC TOTAL ABDOM HYSTERECTOMY      Description: Hysterectomy;  Recorded: 11/21/2014;       MEDICATIONS:    Current Outpatient Medications:     albuterol (VENTOLIN HFA) 108 (90 Base) MCG/ACT inhaler, Inhale 2 puffs into the lungs every 4 hours as needed for wheezing or shortness of  breath, Disp: 18 g, Rfl: 0    cyclobenzaprine (FLEXERIL) 5 MG tablet, Take 5-10 mg by mouth, Disp: , Rfl:     hydrOXYzine HCl (ATARAX) 25 MG tablet, Take 1-2 tablets (25-50 mg) by mouth every 6 hours as needed for anxiety, Disp: 90 tablet, Rfl: 1    ibuprofen (ADVIL/MOTRIN) 800 MG tablet, TAKE 1 TABLET BY MOUTH WITH FOOD OR MILK THREE TIMES DAILY AS NEEDED, Disp: , Rfl:     ondansetron (ZOFRAN ODT) 4 MG ODT tab, Take 1 tablet (4 mg) by mouth every 8 hours as needed for nausea, Disp: 12 tablet, Rfl: 0    ondansetron (ZOFRAN) 4 MG tablet, TAKE 1 TABLET(4 MG) BY MOUTH EVERY 12 HOURS AS NEEDED FOR NAUSEA, Disp: 20 tablet, Rfl: 1    oxyCODONE-acetaminophen (PERCOCET) 5-325 MG tablet, Take 1 tablet by mouth daily, Disp: 30 tablet, Rfl: 0    rizatriptan (MAXALT-MLT) 10 MG ODT, , Disp: , Rfl:     Current Facility-Administered Medications:     Botulinum Toxin Type A (BOTOX) 200 units injection 200 Units, 200 Units, Intramuscular, Once, Inder Urban MD    ALLERGIES:  Allergies   Allergen Reactions    Bee Venom Anaphylaxis    Fexofenadine Hcl Hives    Tramadol Hives    Gabapentin Other (See Comments)     Tolerated 10/2021. Can take a few doses, just not daily.    Ketorolac Other (See Comments)     Other reaction(s): *Unknown  Other reaction(s): *Unknown. Can take a couple doses, just not daily    Ketorolac Tromethamine Other (See Comments)     Other reaction(s): hives, tight chest can't breathe    Meloxicam Other (See Comments)    Nortriptyline Other (See Comments)    Venlafaxine Anxiety and Other (See Comments)     Other reaction(s): Chest Pain  Other reaction(s): Chest Pain       SOCIAL HISTORY:  Social History     Socioeconomic History    Marital status: Single     Spouse name: Not on file    Number of children: Not on file    Years of education: Not on file    Highest education level: Not on file   Occupational History    Not on file   Tobacco Use    Smoking status: Former     Passive exposure: Past    Smokeless  tobacco: Former     Quit date: 9/2/2003    Tobacco comments:     quit smoking 14 yrs ago   Vaping Use    Vaping status: Never Used   Substance and Sexual Activity    Alcohol use: Not Currently     Alcohol/week: 1.0 standard drink of alcohol    Drug use: Yes     Types: Marijuana     Comment: Drug use: recreational    Sexual activity: Not on file   Other Topics Concern    Parent/sibling w/ CABG, MI or angioplasty before 65F 55M? Not Asked   Social History Narrative    Not on file     Social Determinants of Health     Financial Resource Strain: Not on file   Food Insecurity: Not on file   Transportation Needs: Not on file   Physical Activity: Not on file   Stress: Not on file   Social Connections: Not on file   Interpersonal Safety: Low Risk  (6/12/2024)    Interpersonal Safety     Do you feel physically and emotionally safe where you currently live?: Yes     Within the past 12 months, have you been hit, slapped, kicked or otherwise physically hurt by someone?: No     Within the past 12 months, have you been humiliated or emotionally abused in other ways by your partner or ex-partner?: No   Housing Stability: Not on file       FAMILY HISTORY:   Family History   Problem Relation Age of Onset    Asthma Mother     Lung Cancer Father     Cerebrovascular Disease Father     Crohn's Disease Father     Kidney Cancer Father     Alcoholism Father     Colon Polyps Father     Asthma Father     Gallbladder Disease Father     Celiac Disease Sister     Gallbladder Disease Sister     Crohn's Disease Sister     Asthma Sister     Brain Hemorrhage Maternal Grandmother     Cancer Maternal Grandmother     Colon Cancer Maternal Grandfather     Brain Hemorrhage Paternal Grandmother     Diabetes Paternal Grandmother     Asthma Son     Asthma Daughter      No significant family history of cardiovascular disease otherwise.    REVIEW OF SYSTEMS:  CONSTITUTIONAL:NEGATIVE for fever, chills, change in weight  INTEGUMENTARY/SKIN: NEGATIVE for  "worrisome rashes, moles or lesions  EYES: NEGATIVE for vision changes or irritation  ENT/MOUTH: NEGATIVE for ear, mouth and throat problems  RESP:NEGATIVE for significant cough or SOB  BREAST: NEGATIVE for masses, tenderness or discharge  CV: NEGATIVE for chest pain, palpitations or peripheral edema  GI: POSITIVE for abdominal pain RUQ and periumbilical, constipation, and Hx gallbladder trouble  negative  MUSCULOSKELETAL: NEGATIVE for significant arthralgias or myalgia  NEURO: NEGATIVE for weakness, dizziness or paresthesias  ENDOCRINE: NEGATIVE for temperature intolerance, skin/hair changes  HEME/ALLERGY/IMMUNE: NEGATIVE for bleeding problems  PSYCHIATRIC: NEGATIVE for changes in mood or affect        PHYSICAL EXAMINATION:  Vitals: /75 (BP Location: Left arm, Patient Position: Sitting, Cuff Size: Adult Regular)   Pulse 80   Ht 1.651 m (5' 5\")   Wt 59.5 kg (131 lb 1.6 oz)   SpO2 99%   BMI 21.82 kg/m    BMI= Body mass index is 21.82 kg/m .  GENERAL/PSYCH: Patient is awake, A&Ox3, NAD, stable mood, good judgement and insight.  HEAD: Atraumatic, Normocephalic  EYES: Anicteric. Pupils equal and reactive  NECK: No masses/LNs. Trachea midline.  CHEST: Symmetrical, Respiratory effort WNL, no stridor.  HEART: Regular Rate and Rhythm.   ABDOMEN: soft, non-distended.  Mild TTP in RUQ.  Low transverse scar from hysterectomy noted.  LOWER EXTREMITIES: No gross deformity. Pulses palpable and equal bilaterally.  SKIN: No visible generalized rash.      LABS:    Admission on 06/09/2024, Discharged on 06/09/2024   Component Date Value    Sodium 06/09/2024 138     Potassium 06/09/2024 4.2     Carbon Dioxide (CO2) 06/09/2024 21 (L)     Anion Gap 06/09/2024 13     Urea Nitrogen 06/09/2024 19.0     Creatinine 06/09/2024 0.99 (H)     GFR Estimate 06/09/2024 74     Calcium 06/09/2024 9.2     Chloride 06/09/2024 104     Glucose 06/09/2024 117 (H)     Alkaline Phosphatase 06/09/2024 85     AST 06/09/2024 14     ALT 06/09/2024 14  "    Protein Total 06/09/2024 7.2     Albumin 06/09/2024 4.6     Bilirubin Total 06/09/2024 0.6     Color Urine 06/09/2024 Yellow     Appearance Urine 06/09/2024 Slightly Cloudy (A)     Glucose Urine 06/09/2024 Negative     Bilirubin Urine 06/09/2024 Negative     Ketones Urine 06/09/2024 Negative     Specific Gravity Urine 06/09/2024 1.023     Blood Urine 06/09/2024 Moderate (A)     pH Urine 06/09/2024 6.5     Protein Albumin Urine 06/09/2024 30 (A)     Urobilinogen Urine 06/09/2024 Normal     Nitrite Urine 06/09/2024 Positive (A)     Leukocyte Esterase Urine 06/09/2024 Large (A)     Bacteria Urine 06/09/2024 Many (A)     Mucus Urine 06/09/2024 Present (A)     RBC Urine 06/09/2024 55 (H)     WBC Urine 06/09/2024 181 (H)     Squamous Epithelials Uri* 06/09/2024 6 (H)     Transitional Epithelials* 06/09/2024 1     hCG Urine Qualitative 06/09/2024 Negative     WBC Count 06/09/2024 12.1 (H)     RBC Count 06/09/2024 4.63     Hemoglobin 06/09/2024 15.7     Hematocrit 06/09/2024 45.6     MCV 06/09/2024 99     MCH 06/09/2024 33.9 (H)     MCHC 06/09/2024 34.4     RDW 06/09/2024 11.7     Platelet Count 06/09/2024 218     % Neutrophils 06/09/2024 67     % Lymphocytes 06/09/2024 20     % Monocytes 06/09/2024 11     % Eosinophils 06/09/2024 1     % Basophils 06/09/2024 1     % Immature Granulocytes 06/09/2024 0     NRBCs per 100 WBC 06/09/2024 0     Absolute Neutrophils 06/09/2024 8.1     Absolute Lymphocytes 06/09/2024 2.4     Absolute Monocytes 06/09/2024 1.3     Absolute Eosinophils 06/09/2024 0.2     Absolute Basophils 06/09/2024 0.1     Absolute Immature Granul* 06/09/2024 0.0     Absolute NRBCs 06/09/2024 0.0     Culture 06/09/2024 >100,000 CFU/mL Escherichia coli (A)     Lactic Acid, Initial 06/09/2024 0.7     Culture 06/09/2024 No Growth     Culture 06/09/2024 No Growth        STUDIES: HIDA (OSH): gallbladder fills with EF: 27%.  Symptoms reproducible with administration of CCK.     US (OSH): no stones, wall thickening, or  pericholecystic fluid.  CBD 2mm    IMPRESSION and PLAN:  Biliary dyskinesia  The patient was thoroughly counseled regarding their Biliary dyskinesia [K82.8].     The patient was informed that the proposed procedure or medical intervention involves removal of the gallbladder laparoscopically (with small incisions and camera) possibly open and does offer a very good likelihood of symptom relief.     The patient was made aware of the risks of the procedure, including but not limited to:    bleeding, conversion to open, bile leak, bile duct injury or other adjacent organ injury, retained gallstones, cardiac or pulmonary related complications and anesthesia complications. Also that difficulties may be encountered during recovery to include: wound or deep intraabdominal infection, wound dehiscence, incisional hernia, bile leak or retained stone requiring ERCP.     The patient was also counseled on the fact that cholecystectomy has ~30-40% chance of not relieving her pain/symptoms associated with biliary dyskinesia.    In the course of the evaluation we did discuss other therapeutic options with the patient, including antibiotics and/or drainage. The risks and benefits of these options were also discussed which include but are not limited to: recurrent worsening episodes.     Also discussed were possible problems or difficulties the patient may encounter if treatment was not pursued at this time. These include: worsening episodes, cholangitis and/or pancreatitis.     The patient was informed that Live Luna DO will be primarily responsible for the procedure. Assistance during the procedure and during hospitalization may also be provided by other physicians, nurses and technicians.     The patient was also informed that if exposure to the patient s blood or body fluids occurs during the procedure, HIV testing of the patient will occur unless they refuse at this time. Risk of blood transfusion is minimal.      The patient will be provided additional education resources by the support staff. If there are ever any questions regarding their diagnosis or the procedure, the patient is encouraged to ask.     All of the patient s or their legal representative s questions have been answered to their satisfaction and they have indicated a clear understanding of this discussion.   Vicki expressed understanding of risks, benefits and alternatives and wished to proceed.     All findings, test results, and diagnosis were discussed with the patient. Vicki  participated in the decision making process and agreed with the plan of care. Questions were sought and answered.           All questions were answered.

## 2024-07-03 NOTE — PATIENT INSTRUCTIONS
You met with Dr. Live Luna.      Today's visit instructions:    Reminder:  Surgery Requirements  Your surgery will be at [address] [place] [address].  You will need to arrive 2 hours early.  You will need someone to drive you home (over 18 years old) and stay with you for 24 hours after the procedure.  You will need a preop physical with your regular doctor within 30 days of surgery- Dr. Velasco will update this for you on the day of your surgery.  Stop any blood thinners, vitamins, minerals, or herbal supplements 5 days before surgery.  If you are taking a prescribed blood thinner or weight loss medication please let us know for specific instructions.  Fasting- a nurse from [facility] will call you 1-2 days before surgery to confirm your procedure and tell you when to stop eating and drinking.   Wash with the soap given/mailed from the clinic the night before surgery and morning of surgery. See instructions in the Surgery Packet.  If you would like a procedure estimate please call [facility] of [facility] at [phone] during the hours of 8 AM - 3 PM (option #2 for on-line request and option #3 for a representative).        If you have questions please contact Liza RN or Elicia RN during regular clinic hours, Monday through Friday 7:30 AM - 4:00 PM, or you can contact us via [employer] at anytime.       If you have urgent needs after-hours, weekends, or holidays please call the hospital at [phone] and ask to speak with our on-call General Surgery Team.    Appointment [phone]  Nurse Advice (Liza or Elicia): [phone]   Surgery Scheduler ([place]): [phone]  Fax: [phone]    After your Laparoscopic Cholecystectomy          Incision care   You may take a shower the day after surgery. Carefully wash your incision with soap and water. Do not submerge yourself in water (bath, whirlpool, hot tub, pool, lake) for 14 days after surgery.   Remove the bandage 1-2 days after surgery but leave the  medical tape (Steri-Strips) or glue in place. These will loosen and fall off on their own 1-2 weeks after surgery.     Always wash your hands before touching your incisions or removing bandages.   It is not unusual to form a collection of fluid or blood under your incision that may feel firm or squishy- it can take several weeks to months for your body to reabsorb it.  At times, it may even drain.  If that should happen keep the area clean with soap, water,  and cover with a clean gauze dressing. You can change this daily or as needed.       Other medicines   Wait to start aspirin or blood thinners until the day after surgery. You can continue your regular medicines at your normal time the day after surgery.    Your pain medicine may cause constipation (hard, dry stools). To help with this, take the stool softener your doctor gave you or an over-the-counter stool softener or laxative. You can stop taking this when you are no longer taking pain medicine and your bowel movements are back to normal.      For pain or discomfort   Take the narcotic pain medicine your doctor gave you as needed and as instructed on the bottle. If you prefer to use over-the-counter medication, use acetaminophen (Tylenol) or ibuprofen (Advil, Motrin) as instructed on the box. Do not take Tylenol if it is in your narcotic pain medication.   Use an ice pack on your abdomen (belly) for 20 minutes at a time as needed for the first 24 hours. Be sure to protect your skin by putting a cloth between the ice pack and your skin.   After 24 hours you can switch to heat for 20 minutes as needed. Be sure to protect your skin by putting a cloth between the heat pack and your skin.   You may experience right shoulder pain after surgery which will go away 1-4 days after your procedure.  This is related to the gas that was used to inflate your abdomen, it gets trapped between your liver and diaphragm.  Walk frequently and apply a heating pad (protecting your  skin from the heating pad with a barrier such as a towel).       Activities   No driving until you feel it s safe to do so. Don t drive while taking narcotic pain medicine.   Don t lift anything heavier than 20 pounds for 3 to 4 weeks after surgery.      Special equipment   None     Diet   You can eat your regular meals after surgery.      When to call the doctor   Call your doctor if you have:   A fever above 101 F (38.3 C) (taken under the tongue), or a fever or chills lasting more than a day.   Redness at the incision site.   Any fluid or blood draining from the incision, especially if it smells bad.    Severe pain that doesn t improve with pain medicine.        We will call you 2 to 4 days after surgery to review this handout, answer questions and help arrange after-surgery care. If you have questions or concerns, please call [phone] during regular office hours. If you need to call after business hours, call [phone] and ask to page the surgeon on-call.     IMPORTANT:  Prior to your surgical procedure, a nurse will be contacting you to obtain a health history.   If they do not reach you by noon the day prior to your surgery, your surgery will be cancelled. If you have your Pre-Op Surgical Physical (H&P) with the Anesthesia Team (PAC), you are exempt from this call. Phone:  [phone] ([facility]) or [phone] ([place]).         Transversus Abdominis Plane (TAP) Pain Block      What is a TAP block?   A TAP block can help you manage your pain after surgery. TAP stands for transversus abdominis plane, which is a muscle layer in your abdomen (belly). The TAP block uses numbing medicine similar to Novocaine to block pain near the site of your surgery.       Why get a TAP block?   To better manage your pain after surgery. A tap block will help keep the pain from getting severe and out of control.   To block pain signals from the nerve, which helps decrease pain after surgery.   To help you sleep, easily  breathe deeply, walk and visit with others.      How is it done?   You will lie still on a table. We will use an ultrasound machine to help us see the correct muscle layer of your abdomen. Then, we ll use a needle to inject the medicine. We may also give you some sleep medicine to lessen the pain of the injection.       The procedure takes between 5 and 15 minutes. It is usually done right before surgery, but will sometimes be after. It depends on your surgery and care needs.      What can I expect?   You may feel numbness, tingling or a heaviness in your abdomen.    You may have pain control up to 72 hours after surgery.   The TAP block may not lessen all of your surgery pain. But most patients feel 50 to 75 percent less pain than without the block.       Tell your nurse if you have:   Numbness or tingling in areas other than where the injection was   Blurry vision   Ringing in your ears   A metallic taste in your mouth

## 2024-07-03 NOTE — LETTER
7/3/2024       RE: Matt Raines  [address]  [facility] [address]       Dear Colleague,    Thank you for referring your patient, Matt Raines, to the [facility] SURG[facility] [place] at [facility]. Please see a copy of my visit note below.    Sophie Gibson  [address]  [facility] [address]    MATT RAINES    Patient seen in consultation for biliary dyskinesia by Sophie Gibson      I had the pleasure of seeing Matt Raines in my office on 7/3/2024 for evaluation.    CC:   Chief Complaint   Patient presents with    New Patient     Abdominal pain, RUQ     Today diagnosis (K82.8) Biliary dyskinesia  (primary encounter diagnosis)  Comment:   Plan: Case Request: CHOLECYSTECTOMY, LAPAROSCOPIC    (R10.11) Abdominal pain, right upper quadrant  Biliary dyskinesia  The patient was thoroughly counseled regarding their Biliary dyskinesia [K82.8].     The patient was informed that the proposed procedure or medical intervention involves removal of the gallbladder laparoscopically (with small incisions and camera) possibly open and does offer a very good likelihood of symptom relief.     The patient was made aware of the risks of the procedure, including but not limited to:    bleeding, conversion to open, bile leak, bile duct injury or other adjacent organ injury, retained gallstones, cardiac or pulmonary related complications and anesthesia complications. Also that difficulties may be encountered during recovery to include: wound or deep intraabdominal infection, wound dehiscence, incisional hernia, bile leak or retained stone requiring ERCP.     The patient was also counseled on the fact that cholecystectomy has ~30-40% chance of not relieving her pain/symptoms associated with biliary dyskinesia.    In the course of the evaluation we did discuss other therapeutic options with the patient, including antibiotics and/or  drainage. The risks and benefits of these options were also discussed which include but are not limited to: recurrent worsening episodes.     Also discussed were possible problems or difficulties the patient may encounter if treatment was not pursued at this time. These include: worsening episodes, cholangitis and/or pancreatitis.     The patient was informed that Live Luna DO will be primarily responsible for the procedure. Assistance during the procedure and during hospitalization may also be provided by other physicians, nurses and technicians.     The patient was also informed that if exposure to the patient s blood or body fluids occurs during the procedure, HIV testing of the patient will occur unless they refuse at this time. Risk of blood transfusion is minimal.     The patient will be provided additional education resources by the support staff. If there are ever any questions regarding their diagnosis or the procedure, the patient is encouraged to ask.     All of the patient s or their legal representative s questions have been answered to their satisfaction and they have indicated a clear understanding of this discussion.   Vicki expressed understanding of risks, benefits and alternatives and wished to proceed.     All findings, test results, and diagnosis were discussed with the patient. Vicki  participated in the decision making process and agreed with the plan of care. Questions were sought and answered.           Patient cleared for surgery with no additional risks identified.     HPI: 38-year-old female referred to clinic for evaluation of biliary dyskinesia and right upper quadrant abdominal pain.  Patient states she has had a longstanding history of abdominal pain associated with episodes of nausea that is worsened with fatty and greasy foods.  She rates the pain as 6-8 out of 10, sharp, cramping in nature.  She reports being evaluated by GI and undergoing endoscopy with no ulcers  or dysmotility issues identified.  She does report large amount of NSAID use for pain control.  CT scan shows no intra-abdominal pathology that could be explaining her pain.  Ultrasound performed at outside facility showed normal gallbladder with no stones.  HIDA scan performed that showed reduced ejection fraction and symptoms were reproducible with administration of CCK.  Nothing seems to make the pain better other than time.    PAST MEDICAL HISTORY:  Past Medical History:   Diagnosis Date    Allergic rhinitis, cause unspecified     Created by Conversion     Anxiety state, unspecified     Created by Conversion     Arthritis     Asthma     Cervical disc displacement     Depression     Depressive disorder     Endometriosis     Endometriosis, site unspecified     External hemorrhoid     Ganglion cyst of left groin     Groin cyst     Head injury     Heart palpitations     wore holter monitor for 24 hours    History of anesthesia complications     itchy in recovery    Major depression     Major depressive disorder, recurrent episode, unspecified     Created by Conversion     Migraines     PONV (postoperative nausea and vomiting)     Unspecified asthma(493.90)     Created by Conversion        PAST SURGICAL HISTORY:  Past Surgical History:   Procedure Laterality Date    CERVICAL FUSION N/A 11/24/2014    Procedure: Anterior Cervical Decompression Fusion Cervical 6 through Cervical 7 Bilateral;  Surgeon: Joselito Wolfe MD;  Location: [facility] OR;  Service:     CYST REMOVAL      [place]    HEMORRHOIDECTOMY EXTERNAL N/A 07/19/2018    Procedure: HEMORRHOIDECTOMY;  Surgeon: Anette Klein MD;  Location: [facility] OR;  Service:     OOPHORECTOMY Left     OH LAP,DIAGNOSTIC ABDOMEN Left 02/13/2018    Procedure: LAPAROSCOPY LEFT  OOPHORECTOMY;  Surgeon: Letty Floyd DO;  Location: [facility];  Service: Gynecology    SHOULDER SURGERY Right     TUBAL LIGATION      ZZC LIGATE FALLOPIAN TUBE       Description: Tubal Ligation;  Recorded: 11/21/2014;    ZC TOTAL ABDOM HYSTERECTOMY      Description: Hysterectomy;  Recorded: 11/21/2014;       MEDICATIONS:    Current Outpatient Medications:     albuterol (VENTOLIN HFA) 108 (90 Base) MCG/ACT inhaler, Inhale 2 puffs into the lungs every 4 hours as needed for wheezing or shortness of breath, Disp: 18 g, Rfl: 0    cyclobenzaprine (FLEXERIL) 5 MG tablet, Take 5-10 mg by mouth, Disp: , Rfl:     hydrOXYzine HCl (ATARAX) 25 MG tablet, Take 1-2 tablets (25-50 mg) by mouth every 6 hours as needed for anxiety, Disp: 90 tablet, Rfl: 1    ibuprofen (ADVIL/MOTRIN) 800 MG tablet, TAKE 1 TABLET BY MOUTH WITH FOOD OR MILK THREE TIMES DAILY AS NEEDED, Disp: , Rfl:     ondansetron (ZOFRAN ODT) 4 MG ODT tab, Take 1 tablet (4 mg) by mouth every 8 hours as needed for nausea, Disp: 12 tablet, Rfl: 0    ondansetron (ZOFRAN) 4 MG tablet, TAKE 1 TABLET(4 MG) BY MOUTH EVERY 12 HOURS AS NEEDED FOR NAUSEA, Disp: 20 tablet, Rfl: 1    oxyCODONE-acetaminophen (PERCOCET) 5-325 MG tablet, Take 1 tablet by mouth daily, Disp: 30 tablet, Rfl: 0    rizatriptan (MAXALT-MLT) 10 MG ODT, , Disp: , Rfl:     Current Facility-Administered Medications:     Botulinum Toxin Type A (BOTOX) 200 units injection 200 Units, 200 Units, Intramuscular, Once, Inder Urban MD    ALLERGIES:  Allergies   Allergen Reactions    Bee Venom Anaphylaxis    Fexofenadine Hcl Hives    Tramadol Hives    Gabapentin Other (See Comments)     Tolerated 10/2021. Can take a few doses, just not daily.    Ketorolac Other (See Comments)     Other reaction(s): *Unknown  Other reaction(s): *Unknown. Can take a couple doses, just not daily    Ketorolac Tromethamine Other (See Comments)     Other reaction(s): hives, tight chest can't breathe    Meloxicam Other (See Comments)    Nortriptyline Other (See Comments)    Venlafaxine Anxiety and Other (See Comments)     Other reaction(s): Chest Pain  Other reaction(s): Chest Pain       SOCIAL  HISTORY:  Social History     Socioeconomic History    Marital status: Single     Spouse name: Not on file    Number of children: Not on file    Years of education: Not on file    Highest education level: Not on file   Occupational History    Not on file   Tobacco Use    Smoking status: Former     Passive exposure: Past    Smokeless tobacco: Former     Quit date: 9/2/2003    Tobacco comments:     quit smoking 14 yrs ago   Vaping Use    Vaping status: Never Used   Substance and Sexual Activity    Alcohol use: Not Currently     Alcohol/week: 1.0 standard drink of alcohol    Drug use: Yes     Types: Marijuana     Comment: Drug use: recreational    Sexual activity: Not on file   Other Topics Concern    Parent/sibling w/ CABG, MI or angioplasty before 65F 55M? Not Asked   Social History Narrative    Not on file     Social Determinants of Health     Financial Resource Strain: Not on file   Food Insecurity: Not on file   Transportation Needs: Not on file   Physical Activity: Not on file   Stress: Not on file   Social Connections: Not on file   Interpersonal Safety: Low Risk  (6/12/2024)    Interpersonal Safety     Do you feel physically and emotionally safe where you currently live?: Yes     Within the past 12 months, have you been hit, slapped, kicked or otherwise physically hurt by someone?: No     Within the past 12 months, have you been humiliated or emotionally abused in other ways by your partner or ex-partner?: No   Housing Stability: Not on file       FAMILY HISTORY:   Family History   Problem Relation Age of Onset    Asthma Mother     Lung Cancer Father     Cerebrovascular Disease Father     Crohn's Disease Father     Kidney Cancer Father     Alcoholism Father     Colon Polyps Father     Asthma Father     Gallbladder Disease Father     Celiac Disease Sister     Gallbladder Disease Sister     Crohn's Disease Sister     Asthma Sister     Brain Hemorrhage Maternal Grandmother     Cancer Maternal Grandmother     Colon  "Cancer Maternal Grandfather     Brain Hemorrhage Paternal Grandmother     Diabetes Paternal Grandmother     Asthma Son     Asthma Daughter      No significant family history of cardiovascular disease otherwise.    REVIEW OF SYSTEMS:  CONSTITUTIONAL:NEGATIVE for fever, chills, change in weight  INTEGUMENTARY/SKIN: NEGATIVE for worrisome rashes, moles or lesions  EYES: NEGATIVE for vision changes or irritation  ENT/MOUTH: NEGATIVE for ear, mouth and throat problems  RESP:NEGATIVE for significant cough or SOB  BREAST: NEGATIVE for masses, tenderness or discharge  CV: NEGATIVE for chest pain, palpitations or peripheral edema  GI: POSITIVE for abdominal pain RUQ and periumbilical, constipation, and Hx gallbladder trouble  negative  MUSCULOSKELETAL: NEGATIVE for significant arthralgias or myalgia  NEURO: NEGATIVE for weakness, dizziness or paresthesias  ENDOCRINE: NEGATIVE for temperature intolerance, skin/hair changes  HEME/ALLERGY/IMMUNE: NEGATIVE for bleeding problems  PSYCHIATRIC: NEGATIVE for changes in mood or affect        PHYSICAL EXAMINATION:  Vitals: /75 (BP Location: Left arm, Patient Position: Sitting, Cuff Size: Adult Regular)   Pulse 80   Ht 1.651 m (5' 5\")   Wt 59.5 kg (131 lb 1.6 oz)   SpO2 99%   BMI 21.82 kg/m    BMI= Body mass index is 21.82 kg/m .  GENERAL/PSYCH: Patient is awake, A&Ox3, NAD, stable mood, good judgement and insight.  HEAD: Atraumatic, Normocephalic  EYES: Anicteric. Pupils equal and reactive  NECK: No masses/LNs. Trachea midline.  CHEST: Symmetrical, Respiratory effort WNL, no stridor.  HEART: Regular Rate and Rhythm.   ABDOMEN: soft, non-distended.  Mild TTP in RUQ.  Low transverse scar from hysterectomy noted.  LOWER EXTREMITIES: No gross deformity. Pulses palpable and equal bilaterally.  SKIN: No visible generalized rash.      LABS:    Admission on 06/09/2024, Discharged on 06/09/2024   Component Date Value    Sodium 06/09/2024 138     Potassium 06/09/2024 4.2     Carbon " Dioxide (CO2) 06/09/2024 21 (L)     Anion Gap 06/09/2024 13     Urea Nitrogen 06/09/2024 19.0     Creatinine 06/09/2024 0.99 (H)     GFR Estimate 06/09/2024 74     Calcium 06/09/2024 9.2     Chloride 06/09/2024 104     Glucose 06/09/2024 117 (H)     Alkaline Phosphatase 06/09/2024 85     AST 06/09/2024 14     ALT 06/09/2024 14     Protein Total 06/09/2024 7.2     Albumin 06/09/2024 4.6     Bilirubin Total 06/09/2024 0.6     Color Urine 06/09/2024 Yellow     Appearance Urine 06/09/2024 Slightly Cloudy (A)     Glucose Urine 06/09/2024 Negative     Bilirubin Urine 06/09/2024 Negative     Ketones Urine 06/09/2024 Negative     Specific Gravity Urine 06/09/2024 1.023     Blood Urine 06/09/2024 Moderate (A)     pH Urine 06/09/2024 6.5     Protein Albumin Urine 06/09/2024 30 (A)     Urobilinogen Urine 06/09/2024 Normal     Nitrite Urine 06/09/2024 Positive (A)     Leukocyte Esterase Urine 06/09/2024 Large (A)     Bacteria Urine 06/09/2024 Many (A)     Mucus Urine 06/09/2024 Present (A)     RBC Urine 06/09/2024 55 (H)     WBC Urine 06/09/2024 181 (H)     Squamous Epithelials Uri* 06/09/2024 6 (H)     Transitional Epithelials* 06/09/2024 1     hCG Urine Qualitative 06/09/2024 Negative     WBC Count 06/09/2024 12.1 (H)     RBC Count 06/09/2024 4.63     Hemoglobin 06/09/2024 15.7     Hematocrit 06/09/2024 45.6     MCV 06/09/2024 99     MCH 06/09/2024 33.9 (H)     MCHC 06/09/2024 34.4     RDW 06/09/2024 11.7     Platelet Count 06/09/2024 218     % Neutrophils 06/09/2024 67     % Lymphocytes 06/09/2024 20     % Monocytes 06/09/2024 11     % Eosinophils 06/09/2024 1     % Basophils 06/09/2024 1     % Immature Granulocytes 06/09/2024 0     NRBCs per 100 WBC 06/09/2024 0     Absolute Neutrophils 06/09/2024 8.1     Absolute Lymphocytes 06/09/2024 2.4     Absolute Monocytes 06/09/2024 1.3     Absolute Eosinophils 06/09/2024 0.2     Absolute Basophils 06/09/2024 0.1     Absolute Immature Granul* 06/09/2024 0.0     Absolute NRBCs  06/09/2024 0.0     Culture 06/09/2024 >100,000 CFU/mL Escherichia coli (A)     Lactic Acid, Initial 06/09/2024 0.7     Culture 06/09/2024 No Growth     Culture 06/09/2024 No Growth        STUDIES: HIDA (OSH): gallbladder fills with EF: 27%.  Symptoms reproducible with administration of CCK.     US (OSH): no stones, wall thickening, or pericholecystic fluid.  CBD 2mm    IMPRESSION and PLAN:  Biliary dyskinesia  The patient was thoroughly counseled regarding their Biliary dyskinesia [K82.8].     The patient was informed that the proposed procedure or medical intervention involves removal of the gallbladder laparoscopically (with small incisions and camera) possibly open and does offer a very good likelihood of symptom relief.     The patient was made aware of the risks of the procedure, including but not limited to:    bleeding, conversion to open, bile leak, bile duct injury or other adjacent organ injury, retained gallstones, cardiac or pulmonary related complications and anesthesia complications. Also that difficulties may be encountered during recovery to include: wound or deep intraabdominal infection, wound dehiscence, incisional hernia, bile leak or retained stone requiring ERCP.     The patient was also counseled on the fact that cholecystectomy has ~30-40% chance of not relieving her pain/symptoms associated with biliary dyskinesia.    In the course of the evaluation we did discuss other therapeutic options with the patient, including antibiotics and/or drainage. The risks and benefits of these options were also discussed which include but are not limited to: recurrent worsening episodes.     Also discussed were possible problems or difficulties the patient may encounter if treatment was not pursued at this time. These include: worsening episodes, cholangitis and/or pancreatitis.     The patient was informed that Live Luna DO will be primarily responsible for the procedure. Assistance during  the procedure and during hospitalization may also be provided by other physicians, nurses and technicians.     The patient was also informed that if exposure to the patient s blood or body fluids occurs during the procedure, HIV testing of the patient will occur unless they refuse at this time. Risk of blood transfusion is minimal.     The patient will be provided additional education resources by the support staff. If there are ever any questions regarding their diagnosis or the procedure, the patient is encouraged to ask.     All of the patient s or their legal representative s questions have been answered to their satisfaction and they have indicated a clear understanding of this discussion.   Vicki expressed understanding of risks, benefits and alternatives and wished to proceed.     All findings, test results, and diagnosis were discussed with the patient. Vicki  participated in the decision making process and agreed with the plan of care. Questions were sought and answered.      All questions were answered.         Again, thank you for allowing me to participate in the care of your patient.      Sincerely,    Live Luna, DO

## 2024-07-03 NOTE — TELEPHONE ENCOUNTER
Left voicemail for patient regarding scheduling surgery with Dr. Miguel Velasco. Offered the patient 7/8/2024. Time has been held at [place] [facility].    Provided contact number to discuss.    P: [phone]    __    Chioma Francisco, on 7/3/2024 at 10:31 AM

## 2024-07-03 NOTE — LETTER
July 3, 2024      TO: Vicki Pierson  [address] [facility] [address]         To Whom It May Concern:    Vicki Pierson is under our professional care for an acute medical condition.  Ms. Piersno was here to for an appointment to see Dr. Luna on 7/3/24. Please excuse her from work for this appointment.      Please contact our office with questions or concerns.    Sincerely,         Elicia Ramos RN on behalf of Dr. Live Luna

## 2024-07-03 NOTE — ASSESSMENT & PLAN NOTE
The patient was thoroughly counseled regarding their Biliary dyskinesia [K82.8].     The patient was informed that the proposed procedure or medical intervention involves removal of the gallbladder laparoscopically (with small incisions and camera) possibly open and does offer a very good likelihood of symptom relief.     The patient was made aware of the risks of the procedure, including but not limited to:    bleeding, conversion to open, bile leak, bile duct injury or other adjacent organ injury, retained gallstones, cardiac or pulmonary related complications and anesthesia complications. Also that difficulties may be encountered during recovery to include: wound or deep intraabdominal infection, wound dehiscence, incisional hernia, bile leak or retained stone requiring ERCP.     The patient was also counseled on the fact that cholecystectomy has ~30-40% chance of not relieving her pain/symptoms associated with biliary dyskinesia.    In the course of the evaluation we did discuss other therapeutic options with the patient, including antibiotics and/or drainage. The risks and benefits of these options were also discussed which include but are not limited to: recurrent worsening episodes.     Also discussed were possible problems or difficulties the patient may encounter if treatment was not pursued at this time. These include: worsening episodes, cholangitis and/or pancreatitis.     The patient was informed that Live Luna DO will be primarily responsible for the procedure. Assistance during the procedure and during hospitalization may also be provided by other physicians, nurses and technicians.     The patient was also informed that if exposure to the patient s blood or body fluids occurs during the procedure, HIV testing of the patient will occur unless they refuse at this time. Risk of blood transfusion is minimal.     The patient will be provided additional education resources by the support  staff. If there are ever any questions regarding their diagnosis or the procedure, the patient is encouraged to ask.     All of the patient s or their legal representative s questions have been answered to their satisfaction and they have indicated a clear understanding of this discussion.   Vicki expressed understanding of risks, benefits and alternatives and wished to proceed.     All findings, test results, and diagnosis were discussed with the patient. Vicki  participated in the decision making process and agreed with the plan of care. Questions were sought and answered.

## 2024-07-03 NOTE — NURSING NOTE
"Chief Complaint   Patient presents with    New Patient     Abdominal pain, RUQ       Vitals:    07/03/24 0737   BP: 111/75   BP Location: Left arm   Patient Position: Sitting   Cuff Size: Adult Regular   Pulse: 80   SpO2: 99%   Weight: 59.5 kg (131 lb 1.6 oz)   Height: 1.651 m (5' 5\")       Body mass index is 21.82 kg/m .                          John Morales, EMT    "

## 2024-07-08 ENCOUNTER — ANESTHESIA EVENT (OUTPATIENT)
Dept: SURGERY | Facility: CLINIC | Age: 39
End: 2024-07-08
Payer: COMMERCIAL

## 2024-07-08 ENCOUNTER — DOCUMENTATION ONLY (OUTPATIENT)
Dept: PHARMACY | Facility: CLINIC | Age: 39
End: 2024-07-08

## 2024-07-08 ENCOUNTER — ANESTHESIA (OUTPATIENT)
Dept: SURGERY | Facility: CLINIC | Age: 39
End: 2024-07-08
Payer: COMMERCIAL

## 2024-07-08 ENCOUNTER — HOSPITAL ENCOUNTER (OUTPATIENT)
Facility: CLINIC | Age: 39
Discharge: HOME | End: 2024-07-08
Attending: SURGERY | Admitting: SURGERY
Payer: COMMERCIAL

## 2024-07-08 VITALS
BODY MASS INDEX: 21.52 KG/M2 | DIASTOLIC BLOOD PRESSURE: 92 MMHG | TEMPERATURE: 98.6 F | RESPIRATION RATE: 18 BRPM | OXYGEN SATURATION: 99 % | WEIGHT: 129.19 LBS | SYSTOLIC BLOOD PRESSURE: 126 MMHG | HEART RATE: 90 BPM | HEIGHT: 65 IN

## 2024-07-08 DIAGNOSIS — K82.8 BILIARY DYSKINESIA: Primary | ICD-10-CM

## 2024-07-08 PROCEDURE — 250N000013 HC RX MED GY IP 250 OP 250 PS 637: Performed by: STUDENT IN AN ORGANIZED HEALTH CARE EDUCATION/TRAINING PROGRAM

## 2024-07-08 PROCEDURE — 258N000003 HC RX IP 258 OP 636: Performed by: STUDENT IN AN ORGANIZED HEALTH CARE EDUCATION/TRAINING PROGRAM

## 2024-07-08 PROCEDURE — 272N000001 HC OR GENERAL SUPPLY STERILE: Performed by: SURGERY

## 2024-07-08 PROCEDURE — 999N000141 HC STATISTIC PRE-PROCEDURE NURSING ASSESSMENT: Performed by: SURGERY

## 2024-07-08 PROCEDURE — 250N000011 HC RX IP 250 OP 636: Performed by: STUDENT IN AN ORGANIZED HEALTH CARE EDUCATION/TRAINING PROGRAM

## 2024-07-08 PROCEDURE — 88304 TISSUE EXAM BY PATHOLOGIST: CPT | Mod: 26 | Performed by: PATHOLOGY

## 2024-07-08 PROCEDURE — 250N000025 HC SEVOFLURANE, PER MIN: Performed by: SURGERY

## 2024-07-08 PROCEDURE — 710N000012 HC RECOVERY PHASE 2, PER MINUTE: Performed by: SURGERY

## 2024-07-08 PROCEDURE — 250N000011 HC RX IP 250 OP 636: Performed by: SURGERY

## 2024-07-08 PROCEDURE — 250N000009 HC RX 250: Performed by: STUDENT IN AN ORGANIZED HEALTH CARE EDUCATION/TRAINING PROGRAM

## 2024-07-08 PROCEDURE — 370N000017 HC ANESTHESIA TECHNICAL FEE, PER MIN: Performed by: SURGERY

## 2024-07-08 PROCEDURE — 47562 LAPAROSCOPIC CHOLECYSTECTOMY: CPT | Mod: GC | Performed by: SURGERY

## 2024-07-08 PROCEDURE — 360N000076 HC SURGERY LEVEL 3, PER MIN: Performed by: SURGERY

## 2024-07-08 PROCEDURE — 88304 TISSUE EXAM BY PATHOLOGIST: CPT | Mod: TC | Performed by: SURGERY

## 2024-07-08 PROCEDURE — 47562 LAPAROSCOPIC CHOLECYSTECTOMY: CPT | Performed by: STUDENT IN AN ORGANIZED HEALTH CARE EDUCATION/TRAINING PROGRAM

## 2024-07-08 PROCEDURE — 250N000009 HC RX 250: Performed by: SURGERY

## 2024-07-08 PROCEDURE — 710N000010 HC RECOVERY PHASE 1, LEVEL 2, PER MIN: Performed by: SURGERY

## 2024-07-08 PROCEDURE — 47562 LAPAROSCOPIC CHOLECYSTECTOMY: CPT | Performed by: NURSE ANESTHETIST, CERTIFIED REGISTERED

## 2024-07-08 RX ORDER — SENNA AND DOCUSATE SODIUM 50; 8.6 MG/1; MG/1
1 TABLET, FILM COATED ORAL AT BEDTIME
Qty: 30 TABLET | Refills: 0 | Status: SHIPPED | OUTPATIENT
Start: 2024-07-08 | End: 2024-07-08

## 2024-07-08 RX ORDER — HYDROMORPHONE HCL IN WATER/PF 6 MG/30 ML
0.2 PATIENT CONTROLLED ANALGESIA SYRINGE INTRAVENOUS EVERY 5 MIN PRN
Status: DISCONTINUED | OUTPATIENT
Start: 2024-07-08 | End: 2024-07-08 | Stop reason: HOSPADM

## 2024-07-08 RX ORDER — LIDOCAINE HYDROCHLORIDE 20 MG/ML
INJECTION, SOLUTION INFILTRATION; PERINEURAL PRN
Status: DISCONTINUED | OUTPATIENT
Start: 2024-07-08 | End: 2024-07-08

## 2024-07-08 RX ORDER — FENTANYL CITRATE 50 UG/ML
25 INJECTION, SOLUTION INTRAMUSCULAR; INTRAVENOUS EVERY 5 MIN PRN
Status: DISCONTINUED | OUTPATIENT
Start: 2024-07-08 | End: 2024-07-08 | Stop reason: HOSPADM

## 2024-07-08 RX ORDER — IBUPROFEN 600 MG/1
600 TABLET, FILM COATED ORAL EVERY 8 HOURS PRN
Qty: 10 TABLET | Refills: 0 | Status: SHIPPED | OUTPATIENT
Start: 2024-07-08

## 2024-07-08 RX ORDER — NALOXONE HYDROCHLORIDE 0.4 MG/ML
0.1 INJECTION, SOLUTION INTRAMUSCULAR; INTRAVENOUS; SUBCUTANEOUS
Status: DISCONTINUED | OUTPATIENT
Start: 2024-07-08 | End: 2024-07-08 | Stop reason: HOSPADM

## 2024-07-08 RX ORDER — BUPIVACAINE HYDROCHLORIDE AND EPINEPHRINE 5; 5 MG/ML; UG/ML
INJECTION, SOLUTION PERINEURAL PRN
Status: DISCONTINUED | OUTPATIENT
Start: 2024-07-08 | End: 2024-07-08 | Stop reason: HOSPADM

## 2024-07-08 RX ORDER — SENNA AND DOCUSATE SODIUM 50; 8.6 MG/1; MG/1
1 TABLET, FILM COATED ORAL AT BEDTIME
Qty: 30 TABLET | Refills: 0 | Status: SHIPPED | OUTPATIENT
Start: 2024-07-08

## 2024-07-08 RX ORDER — FENTANYL CITRATE 50 UG/ML
INJECTION, SOLUTION INTRAMUSCULAR; INTRAVENOUS PRN
Status: DISCONTINUED | OUTPATIENT
Start: 2024-07-08 | End: 2024-07-08

## 2024-07-08 RX ORDER — PROPOFOL 10 MG/ML
INJECTION, EMULSION INTRAVENOUS PRN
Status: DISCONTINUED | OUTPATIENT
Start: 2024-07-08 | End: 2024-07-08

## 2024-07-08 RX ORDER — FENTANYL CITRATE 50 UG/ML
50 INJECTION, SOLUTION INTRAMUSCULAR; INTRAVENOUS EVERY 5 MIN PRN
Status: DISCONTINUED | OUTPATIENT
Start: 2024-07-08 | End: 2024-07-08 | Stop reason: HOSPADM

## 2024-07-08 RX ORDER — SODIUM CHLORIDE, SODIUM LACTATE, POTASSIUM CHLORIDE, CALCIUM CHLORIDE 600; 310; 30; 20 MG/100ML; MG/100ML; MG/100ML; MG/100ML
INJECTION, SOLUTION INTRAVENOUS CONTINUOUS PRN
Status: DISCONTINUED | OUTPATIENT
Start: 2024-07-08 | End: 2024-07-08

## 2024-07-08 RX ORDER — CEFAZOLIN SODIUM/WATER 2 G/20 ML
2 SYRINGE (ML) INTRAVENOUS
Status: DISCONTINUED | OUTPATIENT
Start: 2024-07-08 | End: 2024-07-08 | Stop reason: HOSPADM

## 2024-07-08 RX ORDER — ACETAMINOPHEN 325 MG/1
1000 TABLET ORAL EVERY 6 HOURS
Qty: 36 TABLET | Refills: 0 | Status: SHIPPED | OUTPATIENT
Start: 2024-07-08

## 2024-07-08 RX ORDER — HYDROMORPHONE HCL IN WATER/PF 6 MG/30 ML
0.4 PATIENT CONTROLLED ANALGESIA SYRINGE INTRAVENOUS EVERY 5 MIN PRN
Status: DISCONTINUED | OUTPATIENT
Start: 2024-07-08 | End: 2024-07-08 | Stop reason: HOSPADM

## 2024-07-08 RX ORDER — KETOROLAC TROMETHAMINE 30 MG/ML
INJECTION, SOLUTION INTRAMUSCULAR; INTRAVENOUS PRN
Status: DISCONTINUED | OUTPATIENT
Start: 2024-07-08 | End: 2024-07-08

## 2024-07-08 RX ORDER — ONDANSETRON 2 MG/ML
INJECTION INTRAMUSCULAR; INTRAVENOUS PRN
Status: DISCONTINUED | OUTPATIENT
Start: 2024-07-08 | End: 2024-07-08

## 2024-07-08 RX ORDER — CEFAZOLIN SODIUM/WATER 2 G/20 ML
2 SYRINGE (ML) INTRAVENOUS SEE ADMIN INSTRUCTIONS
Status: DISCONTINUED | OUTPATIENT
Start: 2024-07-08 | End: 2024-07-08 | Stop reason: HOSPADM

## 2024-07-08 RX ORDER — ONDANSETRON 4 MG/1
4 TABLET, ORALLY DISINTEGRATING ORAL EVERY 30 MIN PRN
Status: DISCONTINUED | OUTPATIENT
Start: 2024-07-08 | End: 2024-07-08 | Stop reason: HOSPADM

## 2024-07-08 RX ORDER — SODIUM CHLORIDE, SODIUM LACTATE, POTASSIUM CHLORIDE, CALCIUM CHLORIDE 600; 310; 30; 20 MG/100ML; MG/100ML; MG/100ML; MG/100ML
INJECTION, SOLUTION INTRAVENOUS CONTINUOUS
Status: DISCONTINUED | OUTPATIENT
Start: 2024-07-08 | End: 2024-07-08 | Stop reason: HOSPADM

## 2024-07-08 RX ORDER — OXYCODONE HYDROCHLORIDE 5 MG/1
5 TABLET ORAL EVERY 6 HOURS PRN
Qty: 12 TABLET | Refills: 0 | Status: SHIPPED | OUTPATIENT
Start: 2024-07-08 | End: 2024-07-10

## 2024-07-08 RX ORDER — OXYCODONE HYDROCHLORIDE 5 MG/1
5 TABLET ORAL
Status: COMPLETED | OUTPATIENT
Start: 2024-07-08 | End: 2024-07-08

## 2024-07-08 RX ORDER — INDOCYANINE GREEN AND WATER 25 MG
2.5 KIT INJECTION ONCE
Status: COMPLETED | OUTPATIENT
Start: 2024-07-08 | End: 2024-07-08

## 2024-07-08 RX ORDER — ACETAMINOPHEN 325 MG/1
1000 TABLET ORAL EVERY 6 HOURS
Qty: 36 TABLET | Refills: 0 | Status: SHIPPED | OUTPATIENT
Start: 2024-07-08 | End: 2024-07-08

## 2024-07-08 RX ORDER — ONDANSETRON 2 MG/ML
4 INJECTION INTRAMUSCULAR; INTRAVENOUS EVERY 30 MIN PRN
Status: DISCONTINUED | OUTPATIENT
Start: 2024-07-08 | End: 2024-07-08 | Stop reason: HOSPADM

## 2024-07-08 RX ORDER — IBUPROFEN 600 MG/1
600 TABLET, FILM COATED ORAL EVERY 8 HOURS PRN
Qty: 10 TABLET | Refills: 0 | Status: SHIPPED | OUTPATIENT
Start: 2024-07-08 | End: 2024-07-08

## 2024-07-08 RX ORDER — DEXAMETHASONE SODIUM PHOSPHATE 4 MG/ML
INJECTION, SOLUTION INTRA-ARTICULAR; INTRALESIONAL; INTRAMUSCULAR; INTRAVENOUS; SOFT TISSUE PRN
Status: DISCONTINUED | OUTPATIENT
Start: 2024-07-08 | End: 2024-07-08

## 2024-07-08 RX ADMIN — Medication 100 MG: at 14:33

## 2024-07-08 RX ADMIN — SODIUM CHLORIDE, POTASSIUM CHLORIDE, SODIUM LACTATE AND CALCIUM CHLORIDE: 600; 310; 30; 20 INJECTION, SOLUTION INTRAVENOUS at 12:56

## 2024-07-08 RX ADMIN — HYDROMORPHONE HYDROCHLORIDE 0.5 MG: 1 INJECTION, SOLUTION INTRAMUSCULAR; INTRAVENOUS; SUBCUTANEOUS at 13:37

## 2024-07-08 RX ADMIN — LIDOCAINE HYDROCHLORIDE 60 MG: 20 INJECTION, SOLUTION INFILTRATION; PERINEURAL at 13:06

## 2024-07-08 RX ADMIN — Medication 50 MG: at 13:09

## 2024-07-08 RX ADMIN — ONDANSETRON 4 MG: 2 INJECTION INTRAMUSCULAR; INTRAVENOUS at 14:14

## 2024-07-08 RX ADMIN — DEXAMETHASONE SODIUM PHOSPHATE 4 MG: 4 INJECTION, SOLUTION INTRA-ARTICULAR; INTRALESIONAL; INTRAMUSCULAR; INTRAVENOUS; SOFT TISSUE at 13:11

## 2024-07-08 RX ADMIN — FENTANYL CITRATE 50 MCG: 50 INJECTION, SOLUTION INTRAMUSCULAR; INTRAVENOUS at 15:00

## 2024-07-08 RX ADMIN — FENTANYL CITRATE 25 MCG: 50 INJECTION, SOLUTION INTRAMUSCULAR; INTRAVENOUS at 15:13

## 2024-07-08 RX ADMIN — FENTANYL CITRATE 50 MCG: 50 INJECTION INTRAMUSCULAR; INTRAVENOUS at 14:06

## 2024-07-08 RX ADMIN — Medication 2 G: at 13:00

## 2024-07-08 RX ADMIN — FENTANYL CITRATE 100 MCG: 50 INJECTION INTRAMUSCULAR; INTRAVENOUS at 13:06

## 2024-07-08 RX ADMIN — PROPOFOL 160 MG: 10 INJECTION, EMULSION INTRAVENOUS at 13:09

## 2024-07-08 RX ADMIN — FOSAPREPITANT 150 MG: 150 INJECTION, POWDER, LYOPHILIZED, FOR SOLUTION INTRAVENOUS at 12:23

## 2024-07-08 RX ADMIN — FENTANYL CITRATE 25 MCG: 50 INJECTION, SOLUTION INTRAMUSCULAR; INTRAVENOUS at 14:53

## 2024-07-08 RX ADMIN — KETOROLAC TROMETHAMINE 30 MG: 30 INJECTION, SOLUTION INTRAMUSCULAR at 14:18

## 2024-07-08 RX ADMIN — OXYCODONE HYDROCHLORIDE 5 MG: 5 TABLET ORAL at 15:10

## 2024-07-08 RX ADMIN — Medication 30 MG: at 13:22

## 2024-07-08 RX ADMIN — INDOCYANINE GREEN AND WATER 2.5 MG: KIT at 11:58

## 2024-07-08 RX ADMIN — FENTANYL CITRATE 50 MCG: 50 INJECTION INTRAMUSCULAR; INTRAVENOUS at 13:52

## 2024-07-08 RX ADMIN — DEXMEDETOMIDINE HYDROCHLORIDE 20 MCG: 100 INJECTION, SOLUTION INTRAVENOUS at 13:44

## 2024-07-08 RX ADMIN — MIDAZOLAM 2 MG: 1 INJECTION INTRAMUSCULAR; INTRAVENOUS at 12:53

## 2024-07-08 ASSESSMENT — ENCOUNTER SYMPTOMS: SEIZURES: 0

## 2024-07-08 ASSESSMENT — ACTIVITIES OF DAILY LIVING (ADL)
ADLS_ACUITY_SCORE: 18
ADLS_ACUITY_SCORE: 19
ADLS_ACUITY_SCORE: 19
ADLS_ACUITY_SCORE: 18
ADLS_ACUITY_SCORE: 19

## 2024-07-08 NOTE — OP NOTE
[facility]    Operative Note    Pre-operative diagnosis: Biliary dyskinesia [K82.8];   Post-operative diagnosis Same as preop diagnosis   Procedure: Procedure(s):  CHOLECYSTECTOMY, LAPAROSCOPIC   Surgeon: Surgeons and Role:     * Live Luna DO - Primary     * Dorita Villanueva MD - Resident - Assisting     * Faustina Sanchez MD - Resident - Assisting   Anesthesia: General    Estimated blood loss: Minimal   Drains: None   Specimens: ID Type Source Tests Collected by Time Destination   1 : Gallbladder Tissue Gallbladder SURGICAL PATHOLOGY EXAM Live Luna DO 7/8/2024  2:02 PM       Findings: Normal appearing gallbladder  .   Complications: None.   Implants: None.       OPERATIVE INDICATIONS:  This is a 38 year old female diagnosed with biliary dyskinesia. Given her symptoms, we recommended cholecystectomy. After understanding the risks and benefits of proceeding with surgery, the patient elected to undergo laparoscopic cholecystectomy and consented to undergo surgery.     OPERATIVE DETAILS:  The patient was brought to the operating room and prepared in a routine fashion.  She was supine on the operating table and general anesthesia was induced. Preop antibiotics had been given prior to surgery. SCDs were placed and turned on. She was prepped and draped in the usual sterile fashion. A timeout was performed.      A Veress incision was made in the LUQ and pneumoperitoneum was achieved. A 5 mm supraumbilical incision was made and a port was placed using the visiport technique.  A 5 mm trocar was then placed at the subxiphoid incision. We then placed 2 more 5mm  right subcostal ports. The patient was moved into a steep reverse Trendelenberg position. There were mild adhesions to the gallbladder which were taken down with a combination of blunt and sharp dissection.  The gallbladder was grasped at its fundus and retracted cephalad.   It was also grasped at its infundibulum and retracted laterally.       The peritoneal attachments over the cystic duct and cystic artery were taken down. Dissection was carried out medially toward the liver and laterally along the edge of the gallbladder. The junction of the gallbladder and cystic duct was clearly and completely identified. ICG was used to ensure the cystic duct was identified and the common bile duct was avoided. The cystic artery was  from the cystic duct.  Next, a complete dissection of the upper aspect of the triangle of Calot was performed and the critical view of safety was achieved.     The cystic artery and cystic duct were clipped securely and divided between clips.  The gallbladder was then removed out of its fossa using electrocautery.  It was placed into an Endocatch bag and removed from the abdomen. Next, the gallbladder fossa was assessed and complete hemostasis was ensured. The remaining ports were removed under direct visualization and pneumoperitoneum was released. The skin was closed using 4-0 monocryl suture and dermabond was applied. All counts were correct at the end of the case.  No apparent complications.     Dr. Luna was scrubbed in for the entirety of the procedure.    Dorita Villanueva MD  PGY-7 General Surgery

## 2024-07-08 NOTE — ANESTHESIA POSTPROCEDURE EVALUATION
Patient: Vicki Pierson    Procedure: Procedure(s):  CHOLECYSTECTOMY, LAPAROSCOPIC       Anesthesia Type:  General    Note:  Disposition: Outpatient   Postop Pain Control: Uneventful            Sign Out: Well controlled pain   PONV: No   Neuro/Psych: Uneventful            Sign Out: Acceptable/Baseline neuro status   Airway/Respiratory: Uneventful            Sign Out: Acceptable/Baseline resp. status   CV/Hemodynamics: Uneventful            Sign Out: Acceptable CV status; No obvious hypovolemia; No obvious fluid overload   Other NRE: NONE   DID A NON-ROUTINE EVENT OCCUR? No       Last vitals:  Vitals Value Taken Time   /70 07/08/24 1530   Temp 36.1  C (97  F) 07/08/24 1515   Pulse 85 07/08/24 1543   Resp 14 07/08/24 1530   SpO2 100 % 07/08/24 1543   Vitals shown include unfiled device data.    Electronically Signed By: Nichol Redding MD  July 8, 2024  3:44 PM   na na Accumulating Positives: Long Term  na Build Mastery / [place]  Accumulating Positives: Long Term  na PLEASE

## 2024-07-08 NOTE — DISCHARGE INSTRUCTIONS
After Anesthesia (Sleep Medicine)  What should I do after anesthesia?  You should rest and relax for the next 24 hours. Avoid risky or difficult (strenuous) activity. A responsible adult should stay with you overnight.  Don't drive or use any heavy equipment for 24 hours. Even if you feel normal, your reactions may be affected by the sleep medicine given to you.  Don't drink alcohol or make any important decisions for 24 hours.  Slowly get back to your regular diet, as you feel able.  How should I expect to feel?  It's normal to feel dizzy, light-headed, or faint for up to a full day after anesthesia or while taking pain medicine. If this happens:   Sit down for a few minutes before standing.  Have someone help you when you get up to walk or use the bathroom.  If you have nausea (feel sick to your stomach) or vomit (throw up):   Drink clear liquids (such as apple juice, ginger ale, broth, or 7UP) until you feel better.  If you feel sick to your stomach, or you keep vomiting for 24 hours, please call the doctor.  What else should I know?  You might have a dry mouth, sore throat, muscle aches, or trouble sleeping. These should go away after 24 hours.  Please contact your doctor if you have any other symptoms that concern you, such as fever, pain, bleeding, fluid drainage, swelling, or headache, or if it's been over 8 to 10 hours and you still aren't able to pee (urinate).  If you have a history of sleep apnea, it's very important to use your CPAP machine for the next 24 hours when you nap or sleep.   For informational purposes only. Not to replace the advice of your health care provider. Copyright   2023 [employer]. All rights reserved. Clinically reviewed by Reji Robin MD. [email or web address] [id number] - REV 09/23.        Contacting your Doctor -   To contact a doctor, call Dr. Rivera-Krista office at [phone] or:  [phone] and ask for the resident on call for General Surgery (answered 24 hours a  day)   Emergency Department:  [facility] [place]: [phone]  [facility]: [phone] 911 if you are in need of immediate or emergent help

## 2024-07-08 NOTE — PROGRESS NOTES
PA Initiation    Medication: OXYCODONE HCL 5 MG PO TABS  Insurance Company: [employer] - Phone [phone] Fax [phone]  Pharmacy Filling the Rx: [facility] - [place], [place] - [address]  Filling Pharmacy Phone: [phone]  Start Date: 7/8/2024          Kelley Duenas  [facility] Liaison  Phone [phone]  Fax [phone]  Available on Teams & Vocera

## 2024-07-08 NOTE — ANESTHESIA PROCEDURE NOTES
Airway       Patient location during procedure: OR       Procedure Start/Stop Times: 7/8/2024 1:13 PM  Staff -        Anesthesiologist:  Nina Diehl MD       CRNA: Fina Henson APRN CRNA       Performed By: resident and CRNA  Consent for Airway        Urgency: elective  Indications and Patient Condition       Indications for airway management: pb-procedural       Induction type:intravenous       Mask difficulty assessment: 1 - vent by mask    Final Airway Details       Final airway type: endotracheal airway       Successful airway: ETT - single  Endotracheal Airway Details        ETT size (mm): 7.0       Cuffed: yes       Successful intubation technique: video laryngoscopy       VL Blade Size: MAC 3       Grade View of Cords: 1       Adjucts: stylet       Position: Right       Measured from: gums/teeth       Secured at (cm): 22       Bite block used: None    Post intubation assessment        Placement verified by: capnometry and equal breath sounds        Number of attempts at approach: 1       Number of other approaches attempted: 0       Secured with: tape       Ease of procedure: easy       Dentition: Intact and Unchanged    Medication(s) Administered   Medication Administration Time: 7/8/2024 1:13 PM

## 2024-07-08 NOTE — ANESTHESIA CARE TRANSFER NOTE
Patient: Vicki Pierson    Procedure: Procedure(s):  CHOLECYSTECTOMY, LAPAROSCOPIC       Diagnosis: Biliary dyskinesia [K82.8]  Diagnosis Additional Information: No value filed.    Anesthesia Type:   General     Note:    Oropharynx: oropharynx clear of all foreign objects and spontaneously breathing  Level of Consciousness: awake  Oxygen Supplementation: nasal cannula  Level of Supplemental Oxygen (L/min / FiO2): 4  Independent Airway: airway patency satisfactory and stable  Dentition: dentition unchanged  Vital Signs Stable: post-procedure vital signs reviewed and stable  Report to RN Given: handoff report given  Patient transferred to: PACU    Handoff Report: Identifed the Patient, Identified the Reponsible Provider, Reviewed the pertinent medical history, Discussed the surgical course, Reviewed Intra-OP anesthesia mangement and issues during anesthesia, Set expectations for post-procedure period and Allowed opportunity for questions and acknowledgement of understanding      Vitals:  Vitals Value Taken Time   /67 07/08/24 1445   Temp 36.6  C (97.8  F) 07/08/24 1445   Pulse 102 07/08/24 1450   Resp 21 07/08/24 1450   SpO2 100 % 07/08/24 1450   Vitals shown include unfiled device data.    Electronically Signed By: ANTON Meade CRNA  July 8, 2024  2:50 PM

## 2024-07-08 NOTE — BRIEF OP NOTE
[facility]    Brief Operative Note    Pre-operative diagnosis: Biliary dyskinesia [K82.8]  Post-operative diagnosis Biliary dyskinesia    Procedure: CHOLECYSTECTOMY, LAPAROSCOPIC, N/A - Abdomen    Surgeon: Surgeons and Role:     * Live Luna DO - Primary     * Dorita Villanueva MD - Resident - Assisting     * Faustina Sanchez MD - Resident - Assisting  Anesthesia: General   Estimated Blood Loss: Minimal    Drains: None  Specimens:   ID Type Source Tests Collected by Time Destination   1 : Gallbladder Tissue Gallbladder SURGICAL PATHOLOGY EXAM Live Luna DO 7/8/2024  2:02 PM      Findings:   Non-inflamed gallbladder .  Complications: None.  Implants: * No implants in log *      Dorita Villanueva MD  PGY-7 General Surgery

## 2024-07-08 NOTE — PROGRESS NOTES
Prior Authorization Approval    Medication: OXYCODONE HCL 5 MG PO TABS  Authorization Effective Date: 7/8/2024  Authorization Expiration Date: 8/7/2024  Approved Dose/Quantity: 5 mg /  12 tabs  Reference #: [id number]   Insurance Company: [employer] - Phone [phone] Fax [phone]  Expected CoPay: $ 0   Which Pharmacy is filling the prescription: [facility] - [place], [place] - [address]  Pharmacy Notified: yes                Kelley Duenas  [facility] Liaison  Phone [phone]  Fax [phone]  Available on Teams & Vocera

## 2024-07-08 NOTE — ANESTHESIA PREPROCEDURE EVALUATION
Anesthesia Pre-Procedure Evaluation    Patient: Vicki Pierson   MRN: [id number] : 1985        Procedure : Procedure(s):  CHOLECYSTECTOMY, LAPAROSCOPIC          Past Medical History:   Diagnosis Date    Allergic rhinitis, cause unspecified     Created by Conversion     Anxiety state, unspecified     Created by Conversion     Arthritis     Asthma     Cervical disc displacement     Depression     Depressive disorder     Endometriosis     Endometriosis, site unspecified     External hemorrhoid     Ganglion cyst of left groin     Groin cyst     Head injury     Heart palpitations     wore holter monitor for 24 hours    History of anesthesia complications     itchy in recovery    Major depression     Major depressive disorder, recurrent episode, unspecified     Created by Conversion     Migraines     PONV (postoperative nausea and vomiting)     Unspecified asthma(493.90)     Created by Conversion       Past Surgical History:   Procedure Laterality Date    CERVICAL FUSION N/A 2014    Procedure: Anterior Cervical Decompression Fusion Cervical 6 through Cervical 7 Bilateral;  Surgeon: Joselito Wolfe MD;  Location: [facility];  Service:     CYST REMOVAL      [place]    HEMORRHOIDECTOMY EXTERNAL N/A 2018    Procedure: HEMORRHOIDECTOMY;  Surgeon: Anette Klein MD;  Location: [facility];  Service:     OOPHORECTOMY Left     TX LAP,DIAGNOSTIC ABDOMEN Left 2018    Procedure: LAPAROSCOPY LEFT  OOPHORECTOMY;  Surgeon: Letty Floyd DO;  Location: [facility];  Service: Gynecology    SHOULDER SURGERY Right     TUBAL LIGATION      ZZC LIGATE FALLOPIAN TUBE      Description: Tubal Ligation;  Recorded: 2014;    ZZC TOTAL ABDOM HYSTERECTOMY      Description: Hysterectomy;  Recorded: 2014;      Allergies   Allergen Reactions    Bee Venom Anaphylaxis    Fexofenadine Hcl Hives    Tramadol Hives    Gabapentin Other (See Comments)     Tolerated 10/2021. Can take a few  doses, just not daily.    Ketorolac Other (See Comments)     Other reaction(s): *Unknown  Other reaction(s): *Unknown. Can take a couple doses, just not daily    Ketorolac Tromethamine Other (See Comments)     Other reaction(s): hives, tight chest can't breathe    Meloxicam Other (See Comments)    Nortriptyline Other (See Comments)    Venlafaxine Anxiety and Other (See Comments)     Other reaction(s): Chest Pain  Other reaction(s): Chest Pain      Social History     Tobacco Use    Smoking status: Former     Passive exposure: Past    Smokeless tobacco: Former     Quit date: 9/2/2003    Tobacco comments:     quit smoking 14 yrs ago   Substance Use Topics    Alcohol use: Not Currently     Alcohol/week: 1.0 standard drink of alcohol      Wt Readings from Last 1 Encounters:   07/08/24 58.6 kg (129 lb 3 oz)        Anesthesia Evaluation   Pt has had prior anesthetic. Type: General.    History of anesthetic complications  - PONV.      ROS/MED HX  ENT/Pulmonary:     (+)                     Intermittent, asthma Last exacerbation: years ago,  Treatment: Inhaler prn,              (-) recent URI   Neurologic:  - neg neurologic ROS  (-) no seizures and no CVA   Cardiovascular:  - neg cardiovascular ROS   (+)  - -   -  - -                                 Previous cardiac testing   Echo: Date: Results:    Stress Test:  Date: 5/6/2020 Results:    1.The patient's exercise tolerance was normal achieving 11.9 METS on the standard Dipak protocol.    2.The stress electrocardiogram is negative for inducible ischemic EKG changes after 10 minutes and 15 seconds on the standard Dipak protocol achieving 90% of age-predicted maximal heart rate without any cardiovascular symptoms.    3.Left ventricle ejection fraction is normal. The estimated left ventricular ejection fraction is 60%.    4.No major abnormalities on screening Doppler.    5.Stress echocardiogram is negative for inducible ischemia.    6.No previous study for comparison.    ECG  "Reviewed:  Date: Results:    Cath:  Date: Results:   (-) CORNEJO, syncope, irregular heartbeat/palpitations and murmur   METS/Exercise Tolerance: >4 METS    Hematologic:       Musculoskeletal: Comment: Chronic neck pain, previous airways completed with glidescope for neutral neck positioning      GI/Hepatic: Comment: Biliary dyskinesia    (+) GERD,                   Renal/Genitourinary:     (+) renal disease (hx kidney stones),             Endo: Comment: IgA deficiency listed in chart - patient denies this      Psychiatric/Substance Use:     (+) psychiatric history anxiety and depression  H/O chronic opiod use  (5mg oxycodone once daily). Recreational drug usage: Cannabis (2x daily marijuana).    Infectious Disease:  - neg infectious disease ROS     Malignancy:       Other:      (+)  , H/O Chronic Pain (CRPS right upper extremity),         Physical Exam    Airway        Mallampati: I   TM distance: > 3 FB   Neck ROM: full   Mouth opening: > 3 cm    Respiratory Devices and Support         Dental       (+) Completely normal teeth      Cardiovascular   cardiovascular exam normal       Rhythm and rate: regular and normal (-) no murmur    Pulmonary   pulmonary exam normal        breath sounds clear to auscultation           OUTSIDE LABS:  CBC:   Lab Results   Component Value Date    WBC 12.1 (H) 06/09/2024    WBC 8.9 04/25/2023    HGB 15.7 06/09/2024    HGB 15.0 04/25/2023    HCT 45.6 06/09/2024    HCT 44.4 04/25/2023     06/09/2024     04/25/2023     BMP:   Lab Results   Component Value Date     06/09/2024     04/25/2023    POTASSIUM 4.2 06/09/2024    POTASSIUM 4.6 04/25/2023    CHLORIDE 104 06/09/2024    CHLORIDE 104 04/25/2023    CO2 21 (L) 06/09/2024    CO2 25 04/25/2023    BUN 19.0 06/09/2024    BUN 21.1 (H) 04/25/2023    CR 0.99 (H) 06/09/2024    CR 1.12 (H) 04/25/2023     (H) 06/09/2024     (H) 04/25/2023     COAGS: No results found for: \"PTT\", \"INR\", \"FIBR\"  POC:   Lab Results "   Component Value Date    HCG Negative 06/09/2024    HCGS Negative 09/10/2018     HEPATIC:   Lab Results   Component Value Date    ALBUMIN 4.6 06/09/2024    PROTTOTAL 7.2 06/09/2024    ALT 14 06/09/2024    AST 14 06/09/2024    ALKPHOS 85 06/09/2024    BILITOTAL 0.6 06/09/2024     OTHER:   Lab Results   Component Value Date    LACT 0.7 06/09/2024    A1C 5.0 09/10/2014    NAKIA 9.2 06/09/2024    LIPASE 14 04/25/2023    TSH 0.91 04/23/2020    SED 1 02/15/2023       Anesthesia Plan    ASA Status:  3    NPO Status:  NPO Appropriate    Anesthesia Type: General.     - Airway: ETT   Induction: Intravenous, Propofol.   Maintenance: Balanced.   Techniques and Equipment:     - Airway: Video-Laryngoscope     - Lines/Monitors: BIS     Consents    Anesthesia Plan(s) and associated risks, benefits, and realistic alternatives discussed. Questions answered and patient/representative(s) expressed understanding.     - Discussed: Risks, Benefits and Alternatives for BOTH SEDATION and the PROCEDURE were discussed     - Discussed with:  Patient      - Extended Intubation/Ventilatory Support Discussed: No.      - Patient is DNR/DNI Status: No     Use of blood products discussed: No .     Postoperative Care    Pain management: IV analgesics, Oral pain medications.   PONV prophylaxis: Ondansetron (or other 5HT-3), Dexamethasone or Solumedrol, Background Propofol Infusion, Aprepitant     Comments:    Other Comments: 39 yo female PMHx asthma, CRPS RUE, chronic opioids (5mg oxycodone daily), marijuana use, biliary dyskinesia now for lap cholecystectomy. Plan GETA with standard ASA monitors.            Nina Diehl MD    I have reviewed the pertinent notes and labs in the chart from the past 30 days and (re)examined the patient.  Any updates or changes from those notes are reflected in this note.

## 2024-07-10 ENCOUNTER — PATIENT OUTREACH (OUTPATIENT)
Dept: SURGERY | Facility: CLINIC | Age: 39
End: 2024-07-10
Payer: COMMERCIAL

## 2024-07-10 DIAGNOSIS — K82.8 BILIARY DYSKINESIA: ICD-10-CM

## 2024-07-10 LAB
PATH REPORT.COMMENTS IMP SPEC: NORMAL
PATH REPORT.COMMENTS IMP SPEC: NORMAL
PATH REPORT.FINAL DX SPEC: NORMAL
PATH REPORT.GROSS SPEC: NORMAL
PATH REPORT.MICROSCOPIC SPEC OTHER STN: NORMAL
PATH REPORT.RELEVANT HX SPEC: NORMAL
PHOTO IMAGE: NORMAL

## 2024-07-10 RX ORDER — OXYCODONE HYDROCHLORIDE 5 MG/1
5 TABLET ORAL EVERY 6 HOURS PRN
Qty: 5 TABLET | Refills: 0 | Status: SHIPPED | OUTPATIENT
Start: 2024-07-10

## 2024-07-10 RX ORDER — OXYCODONE HYDROCHLORIDE 5 MG/1
5 TABLET ORAL EVERY 6 HOURS PRN
Qty: 5 TABLET | Refills: 0 | Status: CANCELLED | OUTPATIENT
Start: 2024-07-10

## 2024-07-10 NOTE — PROGRESS NOTES
07/10/24    10:10 AM     Vicki Pierson is a patient of Dr. Live Luna that underwent laparoscopic cholecystectomy approximately 2 days ago (7/8).  Attempted to contact patient via telephone for a status update and review post-op  teaching.  LM on VM to call office.  Await return call.      Of note:  Pathology:  Pending  Wound:  Skin Sealant  Follow-up:  Routine  Restrictions:  - No strenuous exercise for 3-4 weeks  - No lifting, pushing, pulling more than 15-20 pounds for 3-4 weeks  New medications:  Senna, Ibuprofen, Tylenol, Oxycodone. Patient sent a [employer] message that she plans to RTW tomorrow and remains uncomfortable but pain well managed with Tylenol and Ibuprofen with PRN Oxycodone. She is requesting a one time refill of narcotic.  Rx for #5 sent to her pharmacy.  Equipment/Supplies:  None    07/10/24    10:50 AM     Patient returned call and left a message on the nurse triage line that she is recovering well.  She is up ad connor. and overall feels greatly improved after surgery in comparison to preprocedure.  Attempted to reach the patient with no answer left message on voicemail to return call.

## 2024-07-11 NOTE — PROGRESS NOTES
07/11/24    10:41 AM     Attempted to contact patient x 2  telephone call/status update.  LM on  to call office-contact information provided. [employer] message sent to the patient.    Pathology:       Final Diagnosis   A. GALLBLADDER, CHOLECYSTECTOMY:  - Gallbladder with no significant histopathologic abnormality

## 2024-07-18 ENCOUNTER — PATIENT OUTREACH (OUTPATIENT)
Dept: SURGERY | Facility: CLINIC | Age: 39
End: 2024-07-18
Payer: COMMERCIAL

## 2024-07-18 NOTE — PROGRESS NOTES
07/18/24    9:36 AM      Vicki iPerson is a patient of Dr. Live Luna that recently underwent a surgical procedure (laparoscopic cholecystectomy).  The patient was contacted via telephone approximately 7-10 days ago for a status update and post-op teaching.  In lieu of a clinic visit, the patient requested to be contacted at a later date by an RN for an assessment.    Attempted to contact patient via telephone for a status update.  LM on VM to call office.     May undergo spine surgery anytime after 7/29/2024.    Pathology:        Component  Resulting Agency   Case Report   Surgical Pathology Report                         Case: [id number]                                   Authorizing Provider:  Live Luna     Collected:           07/08/2024 02:02 PM                                  DO Niranjan                                                                   Ordering Location:     [facility]                 Received:            07/08/2024 02:21 PM           Pathologist:           Laurie Bagley MD                                                             Specimen:    Gallbladder                                                                                Final Diagnosis   A. GALLBLADDER, CHOLECYSTECTOMY:  - Gallbladder with no significant histopathologic abnormality

## 2024-07-19 NOTE — PROGRESS NOTES
Vicki Pierson was contacted several days post procedure via telephone for a status update and elected to have a telephone follow -up call approximately 7-10 days after original contact in lieu of a clinic visit with Dr. Live Lnua.  She continues to do well and the skin glue  has not peeled off.  The patients wounds are healed and the area is C/D/I.  She is afebrile, pain free except at umbilical site and she is medicating with OTC analgesics PRN, and joseph po(intermittent nausea that is improving); the patient is slowly resuming her normal activity.   Discussed restrictions including no lifting in excess of 15-20 pounds for 3 weeks. Reports intermittent loose stools which should improve over time.    Pathology was reviewed with the patient: Yes    All of Vicki Pierson question's were answered and  she would like to return to the clinic on a PRN basis.  The patient is aware that  she may schedule a post op appointment at any time.    A copy of this note was routed to the patients surgeon.

## 2024-08-01 ENCOUNTER — E-VISIT (OUTPATIENT)
Dept: FAMILY MEDICINE | Facility: CLINIC | Age: 39
End: 2024-08-01
Payer: COMMERCIAL

## 2024-08-01 DIAGNOSIS — B96.89 BACTERIAL VAGINOSIS: Primary | ICD-10-CM

## 2024-08-01 DIAGNOSIS — N76.0 BACTERIAL VAGINOSIS: Primary | ICD-10-CM

## 2024-08-01 PROCEDURE — 99421 OL DIG E/M SVC 5-10 MIN: CPT | Performed by: NURSE PRACTITIONER

## 2024-08-01 RX ORDER — CLINDAMYCIN HCL 300 MG
300 CAPSULE ORAL 2 TIMES DAILY
Qty: 14 CAPSULE | Refills: 0 | Status: SHIPPED | OUTPATIENT
Start: 2024-08-01 | End: 2024-08-08

## 2024-08-01 NOTE — PATIENT INSTRUCTIONS
"Thank you for choosing us for your care. I have placed an order for a prescription so that you can start treatment. View your full visit summary for details by clicking on the link below. Your pharmacist will able to address any questions you may have about the medication.     If you re not feeling better within 2-3 days, please schedule an appointment.  You can schedule an appointment right here in [facility], or call [phone]  If the visit is for the same symptoms as your eVisit, we ll refund the cost of your eVisit if seen within seven days.    Bacterial Vaginosis: Care Instructions  Overview     Bacterial vaginosis is a condition in which there is excess growth of certain bacteria that are normally found in the vagina. Symptoms often include abnormal gray or yellow discharge with a \"fishy\" odor. It is not considered an infection that is spread through sexual contact.  Symptoms can be annoying and uncomfortable. But bacterial vaginosis does not usually cause other health problems. However, in some cases it can lead to more serious issues.  While bacterial vaginosis may go away on its own, most doctors use antibiotics to treat it. You may have been prescribed pills or vaginal cream. With treatment, bacterial vaginosis usually clears up in 5 to 7 days.  Follow-up care is a key part of your treatment and safety. Be sure to make and go to all appointments, and call your doctor if you are having problems. It's also a good idea to know your test results and keep a list of the medicines you take.  How can you care for yourself at home?  Take your antibiotics as directed. Do not stop taking them just because you feel better. You need to take the full course of antibiotics.  Do not eat or drink anything that contains alcohol if you are taking metronidazole or tinidazole.  Keep using your medicine if you start your period. Use pads instead of tampons while using a vaginal cream or suppository. Tampons can absorb the " "medicine.  Wear loose cotton clothing. Do not wear nylon and other materials that hold body heat and moisture close to the skin.  Do not scratch. Relieve itching with a cold pack or a cool bath.  Do not wash your vulva more than once a day. Use plain water or a mild, unscented soap. Do not douche.  When should you call for help?   Call your doctor now or seek immediate medical care if:    You have a fever.     You have new or worse pain in your vagina or pelvis.   Watch closely for changes in your health, and be sure to contact your doctor if:    You have new or worse vaginal itching or discharge.     You have unexpected vaginal bleeding.     You are not getting better as expected.     Your symptoms return after you finish the course of your medicine.   Where can you learn more?  Go to https://www.[email or web address].net/patiented  Enter X360 in the search box to learn more about \"Bacterial Vaginosis: Care Instructions.\"  Current as of: November 27, 2023               Content Version: 14.0    5814-6845 [employer].   Care instructions adapted under license by your healthcare professional. If you have questions about a medical condition or this instruction, always ask your healthcare professional. [employer] disclaims any warranty or liability for your use of this information.      "

## 2024-08-15 ENCOUNTER — OFFICE VISIT (OUTPATIENT)
Dept: FAMILY MEDICINE | Facility: CLINIC | Age: 39
End: 2024-08-15
Payer: COMMERCIAL

## 2024-08-15 VITALS
HEART RATE: 82 BPM | OXYGEN SATURATION: 98 % | TEMPERATURE: 98.1 F | RESPIRATION RATE: 18 BRPM | HEIGHT: 65 IN | BODY MASS INDEX: 21.23 KG/M2 | SYSTOLIC BLOOD PRESSURE: 110 MMHG | DIASTOLIC BLOOD PRESSURE: 72 MMHG | WEIGHT: 127.4 LBS

## 2024-08-15 DIAGNOSIS — J45.20 MILD INTERMITTENT ASTHMA WITHOUT COMPLICATION: ICD-10-CM

## 2024-08-15 DIAGNOSIS — F41.1 ANXIETY STATE: ICD-10-CM

## 2024-08-15 DIAGNOSIS — Z01.818 PRE-OP EXAMINATION: Primary | ICD-10-CM

## 2024-08-15 DIAGNOSIS — R11.0 NAUSEA: ICD-10-CM

## 2024-08-15 DIAGNOSIS — G89.4 CHRONIC PAIN SYNDROME: ICD-10-CM

## 2024-08-15 DIAGNOSIS — G44.209 TENSION-TYPE HEADACHE, NOT INTRACTABLE, UNSPECIFIED CHRONICITY PATTERN: ICD-10-CM

## 2024-08-15 PROCEDURE — 99214 OFFICE O/P EST MOD 30 MIN: CPT

## 2024-08-15 RX ORDER — HYDROCODONE BITARTRATE AND ACETAMINOPHEN 5; 325 MG/1; MG/1
1-2 TABLET ORAL EVERY 6 HOURS PRN
COMMUNITY
Start: 2024-08-08

## 2024-08-15 RX ORDER — ONDANSETRON 4 MG/1
4 TABLET, ORALLY DISINTEGRATING ORAL EVERY 8 HOURS PRN
Qty: 30 TABLET | Refills: 0 | Status: SHIPPED | OUTPATIENT
Start: 2024-08-15 | End: 2024-08-30

## 2024-08-15 RX ORDER — HYDROXYZINE HYDROCHLORIDE 25 MG/1
25-50 TABLET, FILM COATED ORAL EVERY 6 HOURS PRN
Qty: 90 TABLET | Refills: 1 | Status: SHIPPED | OUTPATIENT
Start: 2024-08-15

## 2024-08-15 ASSESSMENT — PAIN SCALES - GENERAL: PAINLEVEL: SEVERE PAIN (7)

## 2024-08-15 NOTE — PROGRESS NOTES
Preoperative Evaluation  [facility]  [address]  [facility] [address]  Phone: [phone]  Fax: [phone]  Primary Provider: ANTON Cheema CNP  Pre-op Performing Provider: ANTON Kuhn CNP  Aug 15, 2024             8/15/2024   Surgical Information   What procedure is being done? ureteroscopy   Facility or Hospital where procedure/surgery will be performed: [facility]   Who is doing the procedure / surgery? dr chandana segovia   Date of surgery / procedure: 08/19/2024   Time of surgery / procedure: 9 am   Where do you plan to recover after surgery? at home with family      Fax number for surgical facility: Note does not need to be faxed, will be available electronically in Epic.    Assessment & Plan     The proposed surgical procedure is considered INTERMEDIATE risk.    Pre-op examination  Scheduled 8/19/2024 for ureteroscopy with laser lithotripsy and ureteral stent placement  with Dr. Chandana Engle.  No prior complications with anesthesia - 7/8/2024 S/P cholecystectomy.      Anxiety state  Takes hydroxyzine 25 mg as needed for anxiety.  Needs refill.  - hydrOXYzine HCl (ATARAX) 25 MG tablet; Take 1-2 tablets (25-50 mg) by mouth every 6 hours as needed for anxiety    Nausea  Nausea related to kidney stone pain.  Needs refill.  - ondansetron (ZOFRAN ODT) 4 MG ODT tab; Take 1 tablet (4 mg) by mouth every 8 hours as needed for nausea    Chronic pain syndrome  Usually managed with oxycodone 5 mg, not currently taking as she is taking hydrocodone for kidney stone pain management.    Mild intermittent asthma without complication  Controlled with no recent asthma exacerbations.    Tension-type headache, not intractable, unspecified chronicity pattern  No concerns, takes rizatriptan 10 mg as needed for headache management.    S/P cervical fusion  Chronic pain of neck.  S/P cervical fusion 11/2014.  Takes cyclobenzaprine 5 mg as needed.     - No identified  additional risk factors other than previously addressed    Antiplatelet or Anticoagulation Medication Instructions   - Patient is on no antiplatelet or anticoagulation medications.    Additional Medication Instructions   - ibuprofen (Advil, Motrin): DO NOT TAKE 1 day before surgery.     Recommendation  Approval given to proceed with proposed procedure, without further diagnostic evaluation.    Connor Cohen is a 38 year old, presenting for the following:  Pre-Op Exam        8/15/2024    10:22 AM   Additional Questions   Roomed by BRENDA Medrano related to upcoming procedure:     Patient is a 38-year-old female presenting for preoperative exam.  Scheduled 8/19/2024 for ureteroscopy with laser lithotripsy and ureteral stent placement  with Dr. Chandana Engle.  No prior complications with anesthesia - 7/8/2024 S/P cholecystectomy.  Medical history includes headaches, asthma, hemorrhoids, immunoglobulin A deficiency, depression, anxiety, chronic pain.     8/5/2024 CT showed 5 mm stone in the proximal right ureter with mild associated hydronephrosis.  Multiple stones in the lower pole the right kidney measuring up to 4 mm. There are bilateral renal cysts.  Met with urologist 8/8/2024 to discuss surgical options as she did not pass stone.  Urine analysis did show small leukocytes, started her on Keflex treatment.  Patient still has not passed kidney stone.  Continues to have right sided abdominal pain sometimes radiating to right flank.  Continues with nausea and takes Zofran as needed.  Current pain is 7 out of 10.  Was prescribed hydrocodone and has been taking for pain management.  Also alternates with ibuprofen.  Intermittent hematuria.  Denies fever or chills.    Controlled intermittent mild asthma, has not used albuterol inhaler for many months.  Takes his hydroxyzine as needed for anxiety.  Migraines managed with rizatriptan as needed.        8/15/2024   Pre-Op Questionnaire   Have you ever had a heart  attack or stroke? No   Have you ever had surgery on your heart or blood vessels, such as a stent placement, a coronary artery bypass, or surgery on an artery in your head, neck, heart, or legs? No   Do you have chest pain with activity? No   Do you have a history of heart failure? No   Do you currently have a cold, bronchitis or symptoms of other infection? No   Do you have a cough, shortness of breath, or wheezing? No   Do you or anyone in your family have previous history of blood clots? No   Do you or does anyone in your family have a serious bleeding problem such as prolonged bleeding following surgeries or cuts? No   Have you ever had problems with anemia or been told to take iron pills? No   Have you had any abnormal blood loss such as black, tarry or bloody stools, or abnormal vaginal bleeding? No   Have you ever had a blood transfusion? No   Are you willing to have a blood transfusion if it is medically needed before, during, or after your surgery? Yes   Have you or any of your relatives ever had problems with anesthesia? (!) YES, not patient, patient's sister was unable to wake up with anesthesia- had cardiac history.    Do you have sleep apnea, excessive snoring or daytime drowsiness? No   Do you have any artifical heart valves or other implanted medical devices like a pacemaker, defibrillator, or continuous glucose monitor? No   Do you have artificial joints? (!) YES   Are you allergic to latex? No      Health Care Directive  Patient does not have a Health Care Directive or Living Will    Preoperative Review of    reviewed - controlled substances reflected in medication list.      Patient Active Problem List    Diagnosis Date Noted    Biliary dyskinesia 07/03/2024     Priority: Medium    Complex regional pain syndrome i of right upper limb 06/11/2024     Priority: Medium    Right upper quadrant pain 06/11/2024     Priority: Medium    Disorder of stomach 09/01/2023     Priority: Medium    Kidney  stone 04/27/2023     Priority: Medium    Insomnia 03/21/2023     Priority: Medium    Opioid dependence (H) 01/21/2022     Priority: Medium    Chronic pain 01/21/2022     Priority: Medium    Localized, primary osteoarthritis of shoulder region 01/21/2022     Priority: Medium    Long term (current) use of opiate analgesic 01/21/2022     Priority: Medium    Glenohumeral arthritis 06/30/2021     Priority: Medium     Formatting of this note might be different from the original.  Added automatically from request for surgery [id number]    Formatting of this note might be different from the original.  Added automatically from request for surgery [id number]      Neck pain 03/26/2019     Priority: Medium     Formatting of this note might be different from the original.  Created by Conversion  Formatting of this note might be different from the original.  Created by Conversion  Formatting of this note might be different from the original.  Overview:   Created by Conversion  Created by Conversion    Formatting of this note might be different from the original.  Created by Conversion    Formatting of this note might be different from the original.  Overview:   Created by Conversion  Created by Conversion  Formatting of this note might be different from the original.  Formatting of this note might be different from the original.  Created by Conversion  Formatting of this note might be different from the original.  Created by Conversion  Formatting of this note might be different from the original.  Overview:   Created by Conversion  Created by Conversion      Anxiety state 05/28/2018     Priority: Medium     Created by Conversion    Replacement Utility updated for latest IMO load    Formatting of this note might be different from the original.  Created by Conversion    Replacement Utility updated for latest IMO load  Formatting of this note might be different from the original.  Created by Conversion    Replacement Utility updated for  latest IMO load  Created by Conversion    Replacement Utility updated for latest IMO load  Formatting of this note might be different from the original.  Overview:   Created by Conversion    Replacement Utility updated for latest IMO load    Formatting of this note might be different from the original.  Created by Conversion    Replacement Utility updated for latest IMO load  Created by Conversion    Replacement Utility updated for latest IMO load    Formatting of this note might be different from the original.  Created by Conversion    Replacement Utility updated for latest IMO load    Formatting of this note might be different from the original.  Created by Conversion    Replacement Utility updated for latest IMO load  Formatting of this note might be different from the original.  Created by Conversion    Replacement Utility updated for latest IMO load  Created by Conversion    Replacement Utility updated for latest IMO load  Formatting of this note might be different from the original.  Overview:   Created by Conversion    Replacement Utility updated for latest IMO load  Formatting of this note might be different from the original.  Created by Conversion    Replacement Utility updated for latest IMO load  Formatting of this note might be different from the original.  Overview:   Created by Conversion    Replacement Utility updated for latest IMO load      Endometriosis 05/28/2018     Priority: Medium     Created by Conversion    Replacement Utility updated for latest IMO load    Formatting of this note might be different from the original.  Created by Conversion    Replacement Utility updated for latest IMO load  Formatting of this note might be different from the original.  Created by Conversion    Replacement Utility updated for latest IMO load  Created by Conversion    Replacement Utility updated for latest IMO load  Formatting of this note might be different from the original.  Overview:   Created by  Conversion    Replacement Utility updated for latest IMO load    Formatting of this note might be different from the original.  Created by Conversion    Replacement Utility updated for latest IMO load  Created by Conversion    Replacement Utility updated for latest IMO load    Formatting of this note might be different from the original.  Created by Conversion    Replacement Utility updated for latest IMO load    Formatting of this note might be different from the original.  Created by Conversion    Replacement Utility updated for latest IMO load  Formatting of this note might be different from the original.  Created by Conversion    Replacement Utility updated for latest IMO load  Created by Conversion    Replacement Utility updated for latest IMO load  Formatting of this note might be different from the original.  Overview:   Created by Conversion    Replacement Utility updated for latest IMO load  Formatting of this note might be different from the original.  Created by Conversion    Replacement Utility updated for latest IMO load  Formatting of this note might be different from the original.  Overview:   Created by Conversion    Replacement Utility updated for latest IMO load      Major depression, recurrent (H24) 05/28/2018     Priority: Medium     Created by Conversion    Replacement Utility updated for latest IMO load    Formatting of this note might be different from the original.  Created by Conversion    Replacement Utility updated for latest IMO load  Formatting of this note might be different from the original.  Created by Conversion    Replacement Utility updated for latest IMO load  Created by Conversion    Replacement Utility updated for latest IMO load  Formatting of this note might be different from the original.  Overview:   Created by Conversion    Replacement Utility updated for latest IMO load    Formatting of this note might be different from the original.  Created by Conversion    Replacement  Utility updated for latest IMO load  Created by Conversion    Replacement Utility updated for latest IMO load    Formatting of this note might be different from the original.  Created by Conversion    Replacement Utility updated for latest IMO load    Formatting of this note might be different from the original.  Created by Conversion    Replacement Utility updated for latest IMO load  Formatting of this note might be different from the original.  Created by Conversion    Replacement Utility updated for latest IMO load  Created by Conversion    Replacement Utility updated for latest IMO load  Formatting of this note might be different from the original.  Overview:   Created by Conversion    Replacement Utility updated for latest IMO load  Formatting of this note might be different from the original.  Created by Conversion    Replacement Utility updated for latest IMO load  Formatting of this note might be different from the original.  Overview:   Created by Conversion    Replacement Utility updated for latest IMO load      Tension-type headache 05/28/2018     Priority: Medium     Created by Conversion    Replacement Utility updated for latest IMO load    Formatting of this note might be different from the original.  Created by Conversion    Replacement Utility updated for latest IMO load  Formatting of this note might be different from the original.  Created by Conversion    Replacement Utility updated for latest IMO load  Created by Conversion    Replacement Utility updated for latest IMO load  Formatting of this note might be different from the original.  Overview:   Created by Conversion    Replacement Utility updated for latest IMO load    Formatting of this note might be different from the original.  Created by Conversion    Replacement Utility updated for latest IMO load  Created by Conversion    Replacement Utility updated for latest IMO load    Formatting of this note might be different from the  original.  Created by Conversion    Replacement Utility updated for latest IMO load    Formatting of this note might be different from the original.  Created by Conversion    Replacement Utility updated for latest IMO load  Formatting of this note might be different from the original.  Created by Conversion    Replacement Utility updated for latest IMO load  Created by Conversion    Replacement Utility updated for latest IMO load  Formatting of this note might be different from the original.  Overview:   Created by Conversion    Replacement Utility updated for latest IMO load  Formatting of this note might be different from the original.  Created by Conversion    Replacement Utility updated for latest IMO load  Formatting of this note might be different from the original.  Overview:   Created by Conversion    Replacement Utility updated for latest IMO load      Asthma 05/28/2018     Priority: Medium     Formatting of this note might be different from the original.  Created by Conversion  Formatting of this note might be different from the original.  Created by Conversion  Created by Conversion  Formatting of this note might be different from the original.  Overview:   Created by Conversion    Formatting of this note might be different from the original.  Created by Conversion  Created by Conversion    Formatting of this note might be different from the original.  Created by Conversion  Formatting of this note might be different from the original.  Overview:   Created by Conversion  Formatting of this note might be different from the original.  Formatting of this note might be different from the original.  Created by Conversion  Formatting of this note might be different from the original.  Created by Conversion  Created by Conversion  Formatting of this note might be different from the original.  Overview:   Created by Conversion      Allergic rhinitis 05/28/2018     Priority: Medium     Formatting of this note  might be different from the original.  Created by Conversion    Replacement Utility updated for latest IMO load  Formatting of this note might be different from the original.  Created by Conversion    Replacement Utility updated for latest IMO load  Created by Conversion    Replacement Utility updated for latest IMO load  Formatting of this note might be different from the original.  Overview:   Created by Conversion    Replacement Utility updated for latest IMO load    Formatting of this note might be different from the original.  Created by Conversion    Replacement Utility updated for latest IMO load  Created by Conversion    Replacement Utility updated for latest IMO load    Formatting of this note might be different from the original.  Created by Conversion    Replacement Utility updated for latest IMO load  Formatting of this note might be different from the original.  Overview:   Created by Conversion    Replacement Utility updated for latest IMO load  Formatting of this note might be different from the original.  Formatting of this note might be different from the original.  Created by Conversion    Replacement Utility updated for latest IMO load  Formatting of this note might be different from the original.  Created by Conversion    Replacement Utility updated for latest IMO load  Created by Conversion    Replacement Utility updated for latest IMO load  Formatting of this note might be different from the original.  Overview:   Created by Conversion    Replacement Utility updated for latest IMO load      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     Priority: Medium    Epigastric pain 05/18/2018     Priority: Medium    Left ovarian cyst 02/13/2018     Priority: Medium    Hemorrhoids 11/09/2015     Priority: Medium    Rectal prolapse 11/09/2015     Priority: Medium    Rectal hemorrhage 10/27/2015     Priority: Medium    Hemorrhage of rectum and anus 10/01/2015     Priority: Medium    Constipation 04/21/2015      Priority: Medium    S/P partial hysterectomy 03/09/2015     Priority: Medium     August 2012    Formatting of this note might be different from the original.  August 2012  Formatting of this note might be different from the original.  August 2012 August 2012  Formatting of this note might be different from the original.  Overview:   August 2012    Formatting of this note might be different from the original.  August 2012 August 2012    Formatting of this note might be different from the original.  August 2012    Formatting of this note might be different from the original.  August 2012  Formatting of this note might be different from the original.  August 2012 August 2012  Formatting of this note might be different from the original.  Overview:   August 2012  Formatting of this note might be different from the original.  August 2012  Formatting of this note might be different from the original.  Overview:   August 2012        Past Medical History:   Diagnosis Date    Allergic rhinitis, cause unspecified     Created by Conversion     Anxiety state, unspecified     Created by Conversion     Arthritis     Asthma     Cervical disc displacement     Depression     Depressive disorder     Endometriosis     Endometriosis, site unspecified     External hemorrhoid     Ganglion cyst of left groin     Groin cyst     Head injury     Heart palpitations     wore holter monitor for 24 hours    History of anesthesia complications     itchy in recovery    Major depression     Major depressive disorder, recurrent episode, unspecified     Created by Conversion     Migraines     PONV (postoperative nausea and vomiting)     Unspecified asthma(493.90)     Created by Conversion      Past Surgical History:   Procedure Laterality Date    CERVICAL FUSION N/A 11/24/2014    Procedure: Anterior Cervical Decompression Fusion Cervical 6 through Cervical 7 Bilateral;  Surgeon: Joselito Wolfe MD;  Location: [facility];   Service:     CYST REMOVAL      [place]    HEMORRHOIDECTOMY EXTERNAL N/A 07/19/2018    Procedure: HEMORRHOIDECTOMY;  Surgeon: Anette Klein MD;  Location: [facility] OR;  Service:     LAPAROSCOPIC CHOLECYSTECTOMY  07/08/2024    LAPAROSCOPIC CHOLECYSTECTOMY N/A 7/8/2024    Procedure: CHOLECYSTECTOMY, LAPAROSCOPIC;  Surgeon: Live Luna, DO;  Location: [facility] OR    OOPHORECTOMY Left     DE LAP,DIAGNOSTIC ABDOMEN Left 02/13/2018    Procedure: LAPAROSCOPY LEFT  OOPHORECTOMY;  Surgeon: Letty Floyd DO;  Location: [facility] OR;  Service: Gynecology    SHOULDER SURGERY Right     TUBAL LIGATION      ZZC LIGATE FALLOPIAN TUBE      Description: Tubal Ligation;  Recorded: 11/21/2014;    ZZC TOTAL ABDOM HYSTERECTOMY      Description: Hysterectomy;  Recorded: 11/21/2014;     Current Outpatient Medications   Medication Sig Dispense Refill    acetaminophen (TYLENOL) 325 MG tablet Take 3 tablets (975 mg) by mouth every 6 hours 36 tablet 0    albuterol (VENTOLIN HFA) 108 (90 Base) MCG/ACT inhaler Inhale 2 puffs into the lungs every 4 hours as needed for wheezing or shortness of breath 18 g 0    cyclobenzaprine (FLEXERIL) 5 MG tablet Take 5-10 mg by mouth      HYDROcodone-acetaminophen (NORCO) 5-325 MG tablet Take 1-2 tablets by mouth every 6 hours as needed      hydrOXYzine HCl (ATARAX) 25 MG tablet Take 1-2 tablets (25-50 mg) by mouth every 6 hours as needed for anxiety 90 tablet 1    ibuprofen (ADVIL/MOTRIN) 600 MG tablet Take 1 tablet (600 mg) by mouth every 8 hours as needed for moderate pain 10 tablet 0    ondansetron (ZOFRAN ODT) 4 MG ODT tab Take 1 tablet (4 mg) by mouth every 8 hours as needed for nausea 12 tablet 0    ondansetron (ZOFRAN) 4 MG tablet TAKE 1 TABLET(4 MG) BY MOUTH EVERY 12 HOURS AS NEEDED FOR NAUSEA 20 tablet 1    SENNA-docusate sodium (SENNA S) 8.6-50 MG tablet Take 1 tablet by mouth at bedtime 30 tablet 0    oxyCODONE (ROXICODONE) 5 MG tablet Take 1 tablet (5 mg) by mouth every 6  hours as needed for pain (Patient not taking: Reported on 8/15/2024) 5 tablet 0    rizatriptan (MAXALT-MLT) 10 MG ODT  (Patient not taking: Reported on 8/15/2024)       Allergies   Allergen Reactions    Bee Venom Anaphylaxis    Fexofenadine Hcl Hives    Tramadol Hives    Gabapentin Other (See Comments)     Tolerated 10/2021. Can take a few doses, just not daily.    Ketorolac Other (See Comments)     Other reaction(s): *Unknown  Other reaction(s): *Unknown. Can take a couple doses, just not daily    Ketorolac Tromethamine Other (See Comments)     Other reaction(s): hives, tight chest can't breathe    Meloxicam Other (See Comments)    Nortriptyline Other (See Comments)    Venlafaxine Anxiety and Other (See Comments)     Other reaction(s): Chest Pain  Other reaction(s): Chest Pain      Social History     Tobacco Use    Smoking status: Former     Passive exposure: Past    Smokeless tobacco: Former     Quit date: 9/2/2003    Tobacco comments:     quit smoking 14 yrs ago   Substance Use Topics    Alcohol use: Not Currently     Alcohol/week: 1.0 standard drink of alcohol     Family History   Problem Relation Age of Onset    Asthma Mother     Lung Cancer Father     Cerebrovascular Disease Father     Crohn's Disease Father     Kidney Cancer Father     Alcoholism Father     Colon Polyps Father     Asthma Father     Gallbladder Disease Father     Celiac Disease Sister     Gallbladder Disease Sister     Crohn's Disease Sister     Asthma Sister     Brain Hemorrhage Maternal Grandmother     Cancer Maternal Grandmother     Colon Cancer Maternal Grandfather     Brain Hemorrhage Paternal Grandmother     Diabetes Paternal Grandmother     Asthma Son     Asthma Daughter      History   Drug Use    Types: Marijuana     Comment: Drug use: recreational     Review of Systems  Review of systems negative otherwise known HPI.    Objective    /72 (BP Location: Left arm, Patient Position: Sitting, Cuff Size: Adult Regular)   Pulse 82    "Temp 98.1  F (36.7  C) (Temporal)   Resp 18   Ht 1.645 m (5' 4.76\")   Wt 57.8 kg (127 lb 6.4 oz)   SpO2 98%   BMI 21.36 kg/m     Estimated body mass index is 21.36 kg/m  as calculated from the following:    Height as of this encounter: 1.645 m (5' 4.76\").    Weight as of this encounter: 57.8 kg (127 lb 6.4 oz).  Physical Exam  General: Alert, oriented, no acute distress.    Head: Normocephalic and atraumatic.   Eyes: Conjunctiva and sclera clear. LUKASZ.   Ears: External ear nontender. TMs intact and clear. Grossly normal hearing.   Oropharynx: Dentition intact. Oral and posterior pharynx pink and moist.     Neck: Supple, no masses or nodes. No adenopathy.    Respiratory: Lungs clear, unlabored. No rales, rhonchi, or wheezes.    Cardiovascular: Regular rate and rhythm, S1 and S2. No murmurs. No peripheral edema.     Gastrointestinal: Normoactive bowel sounds. Soft, nontender, no organomegaly.     Musculoskeletal: No joint tenderness, deformity, or swelling.     Skin: No suspicious lesions, rashes, or abnormalities.    Neurologic: No gross motor or sensory deficit. Mentation intact and speech normal.     Psychiatric: Appropriate affect.     Recent Labs   Lab Test 06/09/24  1357   HGB 15.7         POTASSIUM 4.2   CR 0.99*      Diagnostics  8/5/2024 potassium 4.3, creatinine 0.75, GFR greater than 60 & hemoglobin 15.9, hematocrit 46.1, platelets 255.  No EKG required, no history of coronary heart disease, significant arrhythmia, peripheral arterial disease or other structural heart disease.    Revised Cardiac Risk Index (RCRI)  The patient has the following serious cardiovascular risks for perioperative complications:   - No serious cardiac risks = 0 points     RCRI Interpretation: 0 points: Class I (very low risk - 0.4% complication rate)    Signed Electronically by: ANTON Kuhn CNP  A copy of this evaluation report is provided to the requesting physician.        "

## 2024-08-28 ENCOUNTER — E-VISIT (OUTPATIENT)
Dept: FAMILY MEDICINE | Facility: CLINIC | Age: 39
End: 2024-08-28
Payer: COMMERCIAL

## 2024-08-28 DIAGNOSIS — J02.9 ACUTE SORE THROAT: Primary | ICD-10-CM

## 2024-08-28 PROCEDURE — 99421 OL DIG E/M SVC 5-10 MIN: CPT | Performed by: NURSE PRACTITIONER

## 2024-08-28 NOTE — PATIENT INSTRUCTIONS
Vicki,    Thank you for choosing us for your care. I have placed an order for a lab test(s). View your full visit summary for details by clicking on the link below. You can schedule a lab only appointment right here in [facility], or by calling [phone].    You will receive your lab results and next steps via [employer] when the lab results return.    Sincerely,  ANTON Cheema CNP

## 2024-08-30 DIAGNOSIS — R11.0 NAUSEA: ICD-10-CM

## 2024-08-30 RX ORDER — ONDANSETRON 4 MG/1
4 TABLET, ORALLY DISINTEGRATING ORAL EVERY 8 HOURS PRN
Qty: 30 TABLET | Refills: 0 | Status: SHIPPED | OUTPATIENT
Start: 2024-08-30

## 2024-09-19 ENCOUNTER — TRANSFERRED RECORDS (OUTPATIENT)
Dept: HEALTH INFORMATION MANAGEMENT | Facility: CLINIC | Age: 39
End: 2024-09-19
Payer: COMMERCIAL

## 2024-09-20 DIAGNOSIS — D58.2 ELEVATED HEMOGLOBIN (H): Primary | ICD-10-CM

## 2024-09-23 ENCOUNTER — LAB (OUTPATIENT)
Dept: LAB | Facility: CLINIC | Age: 39
End: 2024-09-23
Payer: COMMERCIAL

## 2024-09-23 DIAGNOSIS — D58.2 ELEVATED HEMOGLOBIN (H): ICD-10-CM

## 2024-09-23 LAB
ERYTHROCYTE [DISTWIDTH] IN BLOOD BY AUTOMATED COUNT: 11.9 % (ref 10–15)
FERRITIN SERPL-MCNC: 93 NG/ML (ref 6–175)
HCT VFR BLD AUTO: 43.1 % (ref 35–47)
HGB BLD-MCNC: 14.5 G/DL (ref 11.7–15.7)
IRON BINDING CAPACITY (ROCHE): 294 UG/DL (ref 240–430)
IRON SATN MFR SERPL: 27 % (ref 15–46)
IRON SERPL-MCNC: 80 UG/DL (ref 37–145)
MCH RBC QN AUTO: 33.3 PG (ref 26.5–33)
MCHC RBC AUTO-ENTMCNC: 33.6 G/DL (ref 31.5–36.5)
MCV RBC AUTO: 99 FL (ref 78–100)
PLATELET # BLD AUTO: 224 10E3/UL (ref 150–450)
RBC # BLD AUTO: 4.35 10E6/UL (ref 3.8–5.2)
WBC # BLD AUTO: 9.4 10E3/UL (ref 4–11)

## 2024-09-23 PROCEDURE — 85027 COMPLETE CBC AUTOMATED: CPT

## 2024-09-23 PROCEDURE — 82728 ASSAY OF FERRITIN: CPT

## 2024-09-23 PROCEDURE — 83540 ASSAY OF IRON: CPT

## 2024-09-23 PROCEDURE — 83550 IRON BINDING TEST: CPT

## 2024-09-23 PROCEDURE — 36415 COLL VENOUS BLD VENIPUNCTURE: CPT

## 2024-09-24 ENCOUNTER — E-VISIT (OUTPATIENT)
Dept: FAMILY MEDICINE | Facility: CLINIC | Age: 39
End: 2024-09-24
Payer: COMMERCIAL

## 2024-09-24 DIAGNOSIS — N89.8 VAGINAL DISCHARGE: Primary | ICD-10-CM

## 2024-09-24 PROCEDURE — 99421 OL DIG E/M SVC 5-10 MIN: CPT | Performed by: NURSE PRACTITIONER

## 2024-09-24 NOTE — PATIENT INSTRUCTIONS
Thank you for choosing us for your care. Given your symptoms, I would like you to do a lab-only visit to determine what is causing them.  I have placed the orders.  Please schedule an appointment with the lab right here in [facility], or call [phone].  I will let you know when the results are back and next steps to take.    Schedule a Lab Only appointment here.

## 2024-10-16 ENCOUNTER — MYC MEDICAL ADVICE (OUTPATIENT)
Dept: FAMILY MEDICINE | Facility: CLINIC | Age: 39
End: 2024-10-16
Payer: COMMERCIAL

## 2024-10-16 ENCOUNTER — NURSE TRIAGE (OUTPATIENT)
Dept: FAMILY MEDICINE | Facility: CLINIC | Age: 39
End: 2024-10-16
Payer: COMMERCIAL

## 2024-10-16 NOTE — TELEPHONE ENCOUNTER
"Nurse Triage SBAR    Is this a 2nd Level Triage? NO, but routing to provider to review due to history of Immunoglobulin A Deficiency    Situation: Patient reports \"cyst\" on face. See [employer] message:    \"I am reaching out to see if I should go to an urgent care for help with a cyst on my cheeks or do I make an appointment to be seen? It's pretty painful\"    Background: Immunoglobulin A Deficiency    Assessment:   Patient reports single \"cyst\"  Located on L cheek, by mouth, on smile line  Describes as raised area of skin with clear borders, normal skin tone in color, feels hard like something is \"under the skin\", center of the bump is soft  Slightly smaller than a dime.  Denies redness to the \"cyst\" or surrounding area  Noticed about a month ago, started out looking like a pimple, then became larger and didn't go away  Tender to touch. Constant throbbing, worse when talking/eating/moving mouth. Rates pain 4/10  Tried warm compresses without change.  Denies fever, widespread rash or other localized rash, chills, weakness, shakiness.  Denies exposure to anyone else with boils, rash, skin infection or other staph infection. Denies history of boils. Patient does have 3 school aged children (middle and high school)  Endorses nausea the last two days, has not vomited. 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    Protocol Recommended Disposition:   See in Office Today or Tomorrow    Recommendation: Provided patient with above disposition and home care advice. Patient scheduled for appointment with provider at preferred clinic in [place] tomorrow 10/17. Education provided to patient to call back to clinic or seek more urgent evaluation if severe pain or fever occurs, widespread rash occurs or she has any new or worsening symptoms. Patient endorses understanding and agrees with plan    Routing to provider to advise if needing to be seen sooner due to history " "of Immunoglobulin A Deficiency.    Routed to provider    Does the patient meet one of the following criteria for ADS visit consideration? 16+ years old, with an [facility] PCP     TIP  Providers, please consider if this condition is appropriate for management at one of our Acute and Diagnostic Services sites.     If patient is a good candidate, please use dotphrase <dot>triageresponse and select Refer to ADS to document.     Trina Fox RN  [facility]      Reason for Disposition   Boil > 1/4 inch across (> 6 mm; larger than a pencil eraser) and on face    Additional Information   Negative: Widespread rash and bright red, sunburn-like and too weak to stand   Negative: Sounds like a life-threatening emergency to the triager   Negative: Painful lump or swelling at opening to anus (rectum)   Negative: Painful lump or swelling at opening to vagina (on labia)   Negative: Painful lump or swelling on scrotum   Negative: Doesn't match the SYMPTOMS of a boil   Negative: Widespread red rash   Negative: Black (necrotic), dark purple, or blisters develop in area of wound   Negative: Patient sounds very sick or weak to the triager   Negative: SEVERE pain (e.g., excruciating)   Negative: Red streak from area of infection   Negative: Fever > 100.4 F (38.0 C)   Negative: Boil > 2 inches across (> 5 cm; larger than a golf ball or ping pong ball)   Negative: Boil > 1/2 inch across (> 12 mm; larger than a marble) and center is soft or pus colored   Negative: Spreading redness around the boil and no fever   Negative: Boil and diabetes mellitus or weak immune system (e.g., HIV positive, cancer chemo, splenectomy, organ transplant, chronic steroids)    Answer Assessment - Initial Assessment Questions  1. APPEARANCE of BOIL: \"What does the boil look like?\"      Raised area on skin, with clear borders, slightly smaller than a dime, normal skin tone in color, feels hard like something is \"under the skin\"  2. LOCATION: " "\"Where is the boil located?\"       On face, L cheek, by mouth on smile line  3. NUMBER: \"How many boils are there?\"       1  4. SIZE: \"How big is the boil?\" (e.g., inches, cm; compare to size of a coin or other object)      Slightly smaller than a dime  5. ONSET: \"When did the boil start?\"      About a month ago, looked like a pimple initially  6. PAIN: \"Is there any pain?\" If Yes, ask: \"How bad is the pain?\"   (Scale 1-10; or mild, moderate, severe)      Tender to touch  Constant throbbing, worse when talking/eating/moving mouth  4/10  7. FEVER: \"Do you have a fever?\" If Yes, ask: \"What is it, how was it measured, and when did it start?\"       denies  8. SOURCE: \"Have you been around anyone with boils or other Staph infections?\" \"Have you ever had boils before?\"      denies  9. OTHER SYMPTOMS: \"Do you have any other symptoms?\" (e.g., shaking chills, weakness, rash elsewhere on body)  Denies chills, weakness, shakiness, rash anywhere else on the body.      Endorses nausea last two days, has not vomited, denies changes in bowel movements (chronic diarrhea due to gallbladder surgery, unchanged)  10. PREGNANCY: \"Is there any chance you are pregnant?\" \"When was your last menstrual period?\"        NA (hysterectomy)    Protocols used: Boil (Skin Abscess)-A-OH    "

## 2024-10-16 NOTE — TELEPHONE ENCOUNTER
Provider Recommendation Follow Up:   Unable to reach patient/caregiver. Left message to return call to [phone] (To enter phone number for call back directly to clinic/staff member). Upon return call please notify caller of provider's recommendations.    MyChart Message sent as well with recommendation.     John Sheridan RN  [facility]

## 2024-11-15 ENCOUNTER — E-VISIT (OUTPATIENT)
Dept: FAMILY MEDICINE | Facility: CLINIC | Age: 39
End: 2024-11-15
Payer: COMMERCIAL

## 2024-11-15 DIAGNOSIS — N76.0 BACTERIAL VAGINOSIS: Primary | ICD-10-CM

## 2024-11-15 DIAGNOSIS — B96.89 BACTERIAL VAGINOSIS: Primary | ICD-10-CM

## 2024-11-15 RX ORDER — CLINDAMYCIN HYDROCHLORIDE 300 MG/1
300 CAPSULE ORAL 2 TIMES DAILY
Qty: 14 CAPSULE | Refills: 0 | Status: SHIPPED | OUTPATIENT
Start: 2024-11-15 | End: 2024-11-22

## 2024-11-15 NOTE — PATIENT INSTRUCTIONS
"Thank you for choosing us for your care. I have placed an order for a prescription so that you can start treatment. View your full visit summary for details by clicking on the link below. Your pharmacist will able to address any questions you may have about the medication.     If you re not feeling better within 2-3 days, please schedule an appointment.  You can schedule an appointment right here in [facility], or call [phone]  If the visit is for the same symptoms as your eVisit, we ll refund the cost of your eVisit if seen within seven days.    Bacterial Vaginosis: Care Instructions  Overview     Bacterial vaginosis is a condition in which there is excess growth of certain bacteria that are normally found in the vagina. Symptoms often include abnormal gray or yellow discharge with a \"fishy\" odor. It is not considered an infection that is spread through sexual contact.  Symptoms can be annoying and uncomfortable. But bacterial vaginosis does not usually cause other health problems. However, in some cases it can lead to more serious issues.  While bacterial vaginosis may go away on its own, most doctors use antibiotics to treat it. You may have been prescribed pills or vaginal cream. With treatment, bacterial vaginosis usually clears up in 5 to 7 days.  Follow-up care is a key part of your treatment and safety. Be sure to make and go to all appointments, and call your doctor if you are having problems. It's also a good idea to know your test results and keep a list of the medicines you take.  How can you care for yourself at home?  Take your antibiotics as directed. Do not stop taking them just because you feel better. You need to take the full course of antibiotics.  Do not eat or drink anything that contains alcohol if you are taking metronidazole or tinidazole.  Keep using your medicine if you start your period. Use pads instead of tampons while using a vaginal cream or suppository. Tampons can absorb the " "medicine.  Wear loose cotton clothing. Do not wear nylon and other materials that hold body heat and moisture close to the skin.  Do not scratch. Relieve itching with a cold pack or a cool bath.  Do not wash your vulva more than once a day. Use plain water or a mild, unscented soap. Do not douche.  When should you call for help?   Call your doctor now or seek immediate medical care if:    You have a fever.     You have new or worse pain in your vagina or pelvis.   Watch closely for changes in your health, and be sure to contact your doctor if:    You have new or worse vaginal itching or discharge.     You have unexpected vaginal bleeding.     You are not getting better as expected.     Your symptoms return after you finish the course of your medicine.   Where can you learn more?  Go to https://www.[employer].net/patiented  Enter X360 in the search box to learn more about \"Bacterial Vaginosis: Care Instructions.\"  Current as of: November 27, 2023  Content Version: 14.2 2024 [employer].   Care instructions adapted under license by your healthcare professional. If you have questions about a medical condition or this instruction, always ask your healthcare professional. [employer] disclaims any warranty or liability for your use of this information.    "

## 2024-12-10 ENCOUNTER — TRANSFERRED RECORDS (OUTPATIENT)
Dept: HEALTH INFORMATION MANAGEMENT | Facility: CLINIC | Age: 39
End: 2024-12-10
Payer: COMMERCIAL

## 2024-12-27 ENCOUNTER — TRANSFERRED RECORDS (OUTPATIENT)
Dept: HEALTH INFORMATION MANAGEMENT | Facility: CLINIC | Age: 39
End: 2024-12-27
Payer: COMMERCIAL

## 2025-01-04 ENCOUNTER — HEALTH MAINTENANCE LETTER (OUTPATIENT)
Age: 40
End: 2025-01-04

## 2025-01-15 ASSESSMENT — ANXIETY QUESTIONNAIRES
2. NOT BEING ABLE TO STOP OR CONTROL WORRYING: SEVERAL DAYS
7. FEELING AFRAID AS IF SOMETHING AWFUL MIGHT HAPPEN: SEVERAL DAYS
GAD7 TOTAL SCORE: 5
8. IF YOU CHECKED OFF ANY PROBLEMS, HOW DIFFICULT HAVE THESE MADE IT FOR YOU TO DO YOUR WORK, TAKE CARE OF THINGS AT HOME, OR GET ALONG WITH OTHER PEOPLE?: SOMEWHAT DIFFICULT
7. FEELING AFRAID AS IF SOMETHING AWFUL MIGHT HAPPEN: SEVERAL DAYS
GAD7 TOTAL SCORE: 5
3. WORRYING TOO MUCH ABOUT DIFFERENT THINGS: SEVERAL DAYS
GAD7 TOTAL SCORE: 5
6. BECOMING EASILY ANNOYED OR IRRITABLE: NOT AT ALL
1. FEELING NERVOUS, ANXIOUS, OR ON EDGE: SEVERAL DAYS
4. TROUBLE RELAXING: SEVERAL DAYS
IF YOU CHECKED OFF ANY PROBLEMS ON THIS QUESTIONNAIRE, HOW DIFFICULT HAVE THESE PROBLEMS MADE IT FOR YOU TO DO YOUR WORK, TAKE CARE OF THINGS AT HOME, OR GET ALONG WITH OTHER PEOPLE: SOMEWHAT DIFFICULT
5. BEING SO RESTLESS THAT IT IS HARD TO SIT STILL: NOT AT ALL

## 2025-01-15 ASSESSMENT — ASTHMA QUESTIONNAIRES
QUESTION_3 LAST FOUR WEEKS HOW OFTEN DID YOUR ASTHMA SYMPTOMS (WHEEZING, COUGHING, SHORTNESS OF BREATH, CHEST TIGHTNESS OR PAIN) WAKE YOU UP AT NIGHT OR EARLIER THAN USUAL IN THE MORNING: NOT AT ALL
ACT_TOTALSCORE: 25
QUESTION_1 LAST FOUR WEEKS HOW MUCH OF THE TIME DID YOUR ASTHMA KEEP YOU FROM GETTING AS MUCH DONE AT WORK, SCHOOL OR AT HOME: NONE OF THE TIME
ACT_TOTALSCORE: 25
QUESTION_4 LAST FOUR WEEKS HOW OFTEN HAVE YOU USED YOUR RESCUE INHALER OR NEBULIZER MEDICATION (SUCH AS ALBUTEROL): NOT AT ALL
QUESTION_5 LAST FOUR WEEKS HOW WOULD YOU RATE YOUR ASTHMA CONTROL: COMPLETELY CONTROLLED
QUESTION_2 LAST FOUR WEEKS HOW OFTEN HAVE YOU HAD SHORTNESS OF BREATH: NOT AT ALL

## 2025-01-19 ASSESSMENT — PATIENT HEALTH QUESTIONNAIRE - PHQ9
10. IF YOU CHECKED OFF ANY PROBLEMS, HOW DIFFICULT HAVE THESE PROBLEMS MADE IT FOR YOU TO DO YOUR WORK, TAKE CARE OF THINGS AT HOME, OR GET ALONG WITH OTHER PEOPLE: SOMEWHAT DIFFICULT
SUM OF ALL RESPONSES TO PHQ QUESTIONS 1-9: 6
SUM OF ALL RESPONSES TO PHQ QUESTIONS 1-9: 6

## 2025-01-20 ENCOUNTER — OFFICE VISIT (OUTPATIENT)
Dept: FAMILY MEDICINE | Facility: CLINIC | Age: 40
End: 2025-01-20
Payer: COMMERCIAL

## 2025-01-20 VITALS
RESPIRATION RATE: 18 BRPM | HEART RATE: 90 BPM | TEMPERATURE: 98.7 F | OXYGEN SATURATION: 98 % | DIASTOLIC BLOOD PRESSURE: 74 MMHG | WEIGHT: 128.8 LBS | SYSTOLIC BLOOD PRESSURE: 114 MMHG | BODY MASS INDEX: 21.99 KG/M2 | HEIGHT: 64 IN

## 2025-01-20 DIAGNOSIS — Z01.818 PRE-OP EXAMINATION: Primary | ICD-10-CM

## 2025-01-20 DIAGNOSIS — F41.1 ANXIETY STATE: ICD-10-CM

## 2025-01-20 DIAGNOSIS — J45.20 MILD INTERMITTENT ASTHMA WITHOUT COMPLICATION: ICD-10-CM

## 2025-01-20 DIAGNOSIS — F33.0 MILD EPISODE OF RECURRENT MAJOR DEPRESSIVE DISORDER: ICD-10-CM

## 2025-01-20 DIAGNOSIS — M48.02 SPINAL STENOSIS IN CERVICAL REGION: ICD-10-CM

## 2025-01-20 DIAGNOSIS — G89.4 CHRONIC PAIN SYNDROME: ICD-10-CM

## 2025-01-20 DIAGNOSIS — G44.209 TENSION-TYPE HEADACHE, NOT INTRACTABLE, UNSPECIFIED CHRONICITY PATTERN: ICD-10-CM

## 2025-01-20 LAB
ANION GAP SERPL CALCULATED.3IONS-SCNC: 7 MMOL/L (ref 7–15)
BUN SERPL-MCNC: 17.7 MG/DL (ref 6–20)
CALCIUM SERPL-MCNC: 9.5 MG/DL (ref 8.8–10.4)
CHLORIDE SERPL-SCNC: 106 MMOL/L (ref 98–107)
CREAT SERPL-MCNC: 0.93 MG/DL (ref 0.51–0.95)
EGFRCR SERPLBLD CKD-EPI 2021: 79 ML/MIN/1.73M2
ERYTHROCYTE [DISTWIDTH] IN BLOOD BY AUTOMATED COUNT: 11.7 % (ref 10–15)
GLUCOSE SERPL-MCNC: 89 MG/DL (ref 70–99)
HCO3 SERPL-SCNC: 28 MMOL/L (ref 22–29)
HCT VFR BLD AUTO: 44.2 % (ref 35–47)
HGB BLD-MCNC: 14.8 G/DL (ref 11.7–15.7)
MCH RBC QN AUTO: 33.1 PG (ref 26.5–33)
MCHC RBC AUTO-ENTMCNC: 33.5 G/DL (ref 31.5–36.5)
MCV RBC AUTO: 99 FL (ref 78–100)
PLATELET # BLD AUTO: 247 10E3/UL (ref 150–450)
POTASSIUM SERPL-SCNC: 4.6 MMOL/L (ref 3.4–5.3)
RBC # BLD AUTO: 4.47 10E6/UL (ref 3.8–5.2)
SODIUM SERPL-SCNC: 141 MMOL/L (ref 135–145)
WBC # BLD AUTO: 8.5 10E3/UL (ref 4–11)

## 2025-01-20 PROCEDURE — 96127 BRIEF EMOTIONAL/BEHAV ASSMT: CPT

## 2025-01-20 PROCEDURE — 36415 COLL VENOUS BLD VENIPUNCTURE: CPT

## 2025-01-20 PROCEDURE — G2211 COMPLEX E/M VISIT ADD ON: HCPCS

## 2025-01-20 PROCEDURE — 80048 BASIC METABOLIC PNL TOTAL CA: CPT

## 2025-01-20 PROCEDURE — 99214 OFFICE O/P EST MOD 30 MIN: CPT

## 2025-01-20 PROCEDURE — 85027 COMPLETE CBC AUTOMATED: CPT

## 2025-01-20 RX ORDER — OXYCODONE AND ACETAMINOPHEN 5; 325 MG/1; MG/1
1 TABLET ORAL
COMMUNITY
Start: 2024-12-23

## 2025-01-20 ASSESSMENT — PAIN SCALES - GENERAL: PAINLEVEL_OUTOF10: SEVERE PAIN (8)

## 2025-01-20 NOTE — PROGRESS NOTES
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 abnormalities.    Neurologic: Mentation intact and speech normal.     Psychiatric: Appropriate affect.     Recent Labs   Lab Test 09/23/24  1549 06/09/24  1357   HGB 14.5 15.7    218   NA  --  138   POTASSIUM  --  4.2   CR  --  0.99*      Diagnostics  Labs pending at this time.  Results will be reviewed when available.   No EKG required, no history of coronary heart disease, significant arrhythmia, peripheral arterial disease or other structural heart disease.    Revised Cardiac Risk Index (RCRI)  The patient has the following serious cardiovascular risks for perioperative complications:   - No serious cardiac risks = 0 points     RCRI Interpretation: 0 points: Class I (very low risk - 0.4% complication rate)     Signed Electronically by: ANTON Kuhn CNP  A copy of this evaluation report is provided to the requesting physician.

## 2025-02-05 ENCOUNTER — E-VISIT (OUTPATIENT)
Dept: FAMILY MEDICINE | Facility: CLINIC | Age: 40
End: 2025-02-05
Payer: COMMERCIAL

## 2025-02-05 DIAGNOSIS — R11.0 NAUSEA: Primary | ICD-10-CM

## 2025-02-05 RX ORDER — ONDANSETRON 4 MG/1
4 TABLET, FILM COATED ORAL EVERY 8 HOURS PRN
Qty: 20 TABLET | Refills: 0 | Status: SHIPPED | OUTPATIENT
Start: 2025-02-05

## 2025-02-26 ENCOUNTER — TRANSFERRED RECORDS (OUTPATIENT)
Dept: HEALTH INFORMATION MANAGEMENT | Facility: CLINIC | Age: 40
End: 2025-02-26
Payer: COMMERCIAL

## 2025-02-28 ENCOUNTER — TRANSFERRED RECORDS (OUTPATIENT)
Dept: HEALTH INFORMATION MANAGEMENT | Facility: CLINIC | Age: 40
End: 2025-02-28
Payer: COMMERCIAL

## 2025-05-02 PROBLEM — T39.95XA ANALGESIC OVERUSE HEADACHE: Status: ACTIVE | Noted: 2024-09-19

## 2025-05-02 PROBLEM — M24.611 ARTHROFIBROSIS OF RIGHT SHOULDER: Status: ACTIVE | Noted: 2021-06-30

## 2025-05-02 PROBLEM — M26.603 BILATERAL TEMPOROMANDIBULAR JOINT DISORDER, UNSPECIFIED: Status: ACTIVE | Noted: 2024-09-19

## 2025-05-02 PROBLEM — N20.1 RIGHT URETERAL STONE: Status: ACTIVE | Noted: 2024-08-08

## 2025-05-02 PROBLEM — G44.40 ANALGESIC OVERUSE HEADACHE: Status: ACTIVE | Noted: 2024-09-19

## 2025-05-02 PROBLEM — G43.719 CHRONIC MIGRAINE WITHOUT AURA, INTRACTABLE, WITHOUT STATUS MIGRAINOSUS: Status: ACTIVE | Noted: 2024-09-19

## 2025-05-02 PROBLEM — N23 RENAL COLIC ON RIGHT SIDE: Status: ACTIVE | Noted: 2024-08-19

## 2025-05-02 RX ORDER — TIZANIDINE 2 MG/1
2-4 TABLET ORAL
COMMUNITY
Start: 2025-01-27 | End: 2025-05-06

## 2025-05-02 RX ORDER — MINOXIDIL 2.5 MG/1
0.5 TABLET ORAL
COMMUNITY
Start: 2025-03-28

## 2025-05-05 ASSESSMENT — PATIENT HEALTH QUESTIONNAIRE - PHQ9
SUM OF ALL RESPONSES TO PHQ QUESTIONS 1-9: 7
SUM OF ALL RESPONSES TO PHQ QUESTIONS 1-9: 7
10. IF YOU CHECKED OFF ANY PROBLEMS, HOW DIFFICULT HAVE THESE PROBLEMS MADE IT FOR YOU TO DO YOUR WORK, TAKE CARE OF THINGS AT HOME, OR GET ALONG WITH OTHER PEOPLE: SOMEWHAT DIFFICULT

## 2025-05-06 ENCOUNTER — TRANSFERRED RECORDS (OUTPATIENT)
Dept: HEALTH INFORMATION MANAGEMENT | Facility: CLINIC | Age: 40
End: 2025-05-06

## 2025-05-06 ENCOUNTER — OFFICE VISIT (OUTPATIENT)
Dept: FAMILY MEDICINE | Facility: CLINIC | Age: 40
End: 2025-05-06
Payer: COMMERCIAL

## 2025-05-06 VITALS
BODY MASS INDEX: 20.49 KG/M2 | HEIGHT: 64 IN | SYSTOLIC BLOOD PRESSURE: 121 MMHG | WEIGHT: 120 LBS | DIASTOLIC BLOOD PRESSURE: 83 MMHG | RESPIRATION RATE: 18 BRPM | TEMPERATURE: 98.7 F | OXYGEN SATURATION: 98 % | HEART RATE: 96 BPM

## 2025-05-06 DIAGNOSIS — R11.0 NAUSEA: ICD-10-CM

## 2025-05-06 DIAGNOSIS — B35.1 ONYCHOMYCOSIS: Primary | ICD-10-CM

## 2025-05-06 DIAGNOSIS — Z79.899 MEDICATION MANAGEMENT: ICD-10-CM

## 2025-05-06 DIAGNOSIS — Z12.31 VISIT FOR SCREENING MAMMOGRAM: ICD-10-CM

## 2025-05-06 PROBLEM — M47.812 CERVICAL SPONDYLOSIS WITHOUT MYELOPATHY: Status: ACTIVE | Noted: 2025-05-06

## 2025-05-06 PROBLEM — R20.2 PARESTHESIA OF ARM: Status: ACTIVE | Noted: 2025-03-12

## 2025-05-06 PROCEDURE — 1125F AMNT PAIN NOTED PAIN PRSNT: CPT | Performed by: NURSE PRACTITIONER

## 2025-05-06 PROCEDURE — 36415 COLL VENOUS BLD VENIPUNCTURE: CPT | Performed by: NURSE PRACTITIONER

## 2025-05-06 PROCEDURE — 3079F DIAST BP 80-89 MM HG: CPT | Performed by: NURSE PRACTITIONER

## 2025-05-06 PROCEDURE — 99214 OFFICE O/P EST MOD 30 MIN: CPT | Performed by: NURSE PRACTITIONER

## 2025-05-06 PROCEDURE — 3074F SYST BP LT 130 MM HG: CPT | Performed by: NURSE PRACTITIONER

## 2025-05-06 PROCEDURE — 80076 HEPATIC FUNCTION PANEL: CPT | Performed by: NURSE PRACTITIONER

## 2025-05-06 RX ORDER — TERBINAFINE HYDROCHLORIDE 250 MG/1
250 TABLET ORAL DAILY
Qty: 90 TABLET | Refills: 0 | Status: SHIPPED | OUTPATIENT
Start: 2025-05-06 | End: 2025-08-04

## 2025-05-06 RX ORDER — ONDANSETRON 4 MG/1
4 TABLET, FILM COATED ORAL EVERY 8 HOURS PRN
Qty: 20 TABLET | Refills: 0 | Status: SHIPPED | OUTPATIENT
Start: 2025-05-06

## 2025-05-06 ASSESSMENT — PAIN SCALES - GENERAL: PAINLEVEL_OUTOF10: SEVERE PAIN (8)

## 2025-05-06 NOTE — PROGRESS NOTES
Assessment and Plan:     Onychomycosis  Discussed treatment options.  Patient request terbinafine.  Will treat with 250 mg daily for 12 weeks.  Educated on indications and side effects.  Obtained baseline hepatic panel today.  She will follow-up in 6 weeks for lab only repeat hepatic panel.  If no improved symptoms, will refer to podiatry.  - terbinafine (LAMISIL) 250 MG tablet  Dispense: 90 tablet; Refill: 0    Nausea  She continues Zofran as needed.  - ondansetron (ZOFRAN) 4 MG tablet  Dispense: 20 tablet; Refill: 0    Visit for screening mammogram  - MA Screening Bilateral w/ [facility]    Medication management  - Hepatic panel (Albumin, ALT, AST, Bili, Alk Phos, TP)  - Hepatic panel (Albumin, ALT, AST, Bili, Alk Phos, TP)        Subjective:     Vicki is a 39 year old female presenting to the clinic for concerns of onychomycosis.  Patient was diagnosed with onychomycosis multiple years ago via toenail clipping.  Patient states her toenails are now thickened and discolored.  She is tearful while speaking of this because she does not want wear flip-flops during the summer.  Toenails are occasionally painful because she develops pain nails that bleed easily.  She is interested in starting treatment.  Secondly, patient was recently ill with a viral gastroenteritis.  She requests refill of Zofran to take as needed for nausea.  Symptoms have improved.    Reviewof Systems: A complete 14 point review of systems was obtained and is negative or as stated in the history of present illness.    Social History     Socioeconomic History    Marital status: Single     Spouse name: Not on file    Number of children: Not on file    Years of education: Not on file    Highest education level: Not on file   Occupational History    Not on file   Tobacco Use    Smoking status: Former     Passive exposure: Past    Smokeless tobacco: Former     Quit date: 9/2/2003    Tobacco comments:     quit smoking 14 yrs ago   Vaping Use    Vaping status:  Never Used   Substance and Sexual Activity    Alcohol use: Not Currently     Alcohol/week: 1.0 standard drink of alcohol    Drug use: Yes     Types: Marijuana     Comment: Drug use: recreational    Sexual activity: Not on file   Other Topics Concern    Parent/sibling w/ CABG, MI or angioplasty before 65F 55M? Not Asked   Social History Narrative    Not on file     Social Drivers of Health     Financial Resource Strain: Not on file   Food Insecurity: No Food Insecurity (1/27/2025)    Received from [facility]     Hunger Vital Sign     Worried About Running Out of Food in the Last Year: Never true     Ran Out of Food in the Last Year: Never true   Transportation Needs: No Transportation Needs (1/27/2025)    Received from [facility]     PRAPARE - Transportation     Lack of Transportation (Medical): No     Lack of Transportation (Non-Medical): No   Physical Activity: Not on file   Stress: Not on file   Social Connections: Not on file   Interpersonal Safety: Not At Risk (1/27/2025)    Received from [facility]     Humiliation, Afraid, Rape, and Kick questionnaire     Fear of Current or Ex-Partner: No     Emotionally Abused: No     Physically Abused: No     Sexually Abused: No   Housing Stability: Low Risk  (1/27/2025)    Received from [facility]     Housing Stability Vital Sign     Unable to Pay for Housing in the Last Year: No     Number of Times Moved in the Last Year: 0     Homeless in the Last Year: No       Active Ambulatory Problems     Diagnosis Date Noted    Anxiety state 05/28/2018    Endometriosis 05/28/2018    Left ovarian cyst 02/13/2018    Major depression, recurrent 05/28/2018    Tension-type headache 05/28/2018    S/P partial hysterectomy 03/09/2015    Neck pain 03/26/2019    Asthma 05/28/2018    Glenohumeral arthritis 06/30/2021    Allergic rhinitis 05/28/2018    Opioid dependence (H) 01/21/2022    Chronic pain 01/21/2022    Localized, primary osteoarthritis of  shoulder region 01/21/2022    Long term (current) use of opiate analgesic 01/21/2022    Complex regional pain syndrome i of right upper limb 06/11/2024    Constipation 04/21/2015    Disorder of stomach 09/01/2023    Epigastric pain 05/18/2018    Right upper quadrant pain 06/11/2024    Hemorrhoids 11/09/2015    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    Insomnia 03/21/2023    Kidney stone 04/27/2023    Hemorrhage of rectum and anus 10/01/2015    Rectal hemorrhage 10/27/2015    Rectal prolapse 11/09/2015    Biliary dyskinesia 07/03/2024    Analgesic overuse headache 09/19/2024    Arthrofibrosis of right shoulder 06/30/2021    Bilateral temporomandibular joint disorder, unspecified 09/19/2024    Chronic migraine without aura, intractable, without status migrainosus 09/19/2024    Renal colic on right side 08/19/2024    Right ureteral stone 08/08/2024    Cervical spondylosis without myelopathy 05/06/2025    Paresthesia of arm 03/12/2025     Resolved Ambulatory Problems     Diagnosis Date Noted    Cervical radiculopathy 11/24/2014    Pelvic pain 02/13/2018    Precordial pain 09/16/2020    Hematemesis 03/09/2015    Asthma with exacerbation 05/15/2010    Torticollis 03/26/2019    Tear of right glenoid labrum 12/14/2020    Palpitations 09/25/2020    Abnormal weight loss 01/22/2015    Abnormal blood chemistry 03/26/2019    Shoulder joint pain 01/21/2022     Past Medical History:   Diagnosis Date    Allergic rhinitis, cause unspecified     Anxiety state, unspecified     Arthritis     Cervical disc displacement     Depression     Depressive disorder     Endometriosis, site unspecified     External hemorrhoid     Ganglion cyst of left groin     Groin cyst     Head injury     Heart palpitations     History of anesthesia complications     Major depression     Major depressive disorder, recurrent episode, unspecified     Migraines     PONV (postoperative nausea and vomiting)     Unspecified asthma(493.90)        Family History  "  Problem Relation Age of Onset    Asthma Mother     Lung Cancer Father     Cerebrovascular Disease Father     Crohn's Disease Father     Kidney Cancer Father     Alcoholism Father     Colon Polyps Father     Asthma Father     Gallbladder Disease Father     Celiac Disease Sister     Gallbladder Disease Sister     Crohn's Disease Sister     Asthma Sister     Brain Hemorrhage Maternal Grandmother     Cancer Maternal Grandmother     Colon Cancer Maternal Grandfather     Brain Hemorrhage Paternal Grandmother     Diabetes Paternal Grandmother     Asthma Son     Asthma Daughter        Objective:     /83   Pulse 96   Temp 98.7  F (37.1  C)   Resp 18   Ht 1.626 m (5' 4\")   Wt 54.4 kg (120 lb)   SpO2 98%   Breastfeeding No   BMI 20.60 kg/m      Patient is alert, in no obvious distress.   Skin: Warm, dry.    Lungs:  Clear to auscultation. Respirations even and unlabored.  No wheezing or rales noted.   Heart:  Regular rate and rhythm.  No murmurs, S3, S4, gallops, or rubs.    Abdomen: Soft, nontender.  No organomegaly. Bowel sounds normoactive. No guarding or masses noted.   Musculoskeletal: Toenails appear thickened, primarily large toenails.        Answers submitted by the patient for this visit:  Patient Health Questionnaire (Submitted on 5/5/2025)  If you checked off any problems, how difficult have these problems made it for you to do your work, take care of things at home, or get along with other people?: Somewhat difficult  PHQ9 TOTAL SCORE: 7  General Questionnaire (Submitted on 5/5/2025)  Chief Complaint: Chronic problems general questions HPI Form  What is the reason for your visit today? : Fungal toenails  How many servings of fruits and vegetables do you eat daily?: 0-1  On average, how many sweetened beverages do you drink each day (Examples: soda, juice, sweet tea, etc.  Do NOT count diet or artificially sweetened beverages)?: 3  How many minutes a day do you exercise enough to make your heart beat " faster?: 60 or more  How many days a week do you exercise enough to make your heart beat faster?: 5  How many days per week do you miss taking your medication?: 0  Questionnaire about: Chronic problems general questions HPI Form (Submitted on 5/5/2025)  Chief Complaint: Chronic problems general questions HPI Form

## 2025-05-07 ENCOUNTER — RESULTS FOLLOW-UP (OUTPATIENT)
Dept: FAMILY MEDICINE | Facility: CLINIC | Age: 40
End: 2025-05-07

## 2025-05-07 LAB
ALBUMIN SERPL BCG-MCNC: 4.6 G/DL (ref 3.5–5.2)
ALP SERPL-CCNC: 72 U/L (ref 40–150)
ALT SERPL W P-5'-P-CCNC: 17 U/L (ref 0–50)
AST SERPL W P-5'-P-CCNC: 21 U/L (ref 0–45)
BILIRUB DIRECT SERPL-MCNC: 0.25 MG/DL (ref 0–0.3)
BILIRUB SERPL-MCNC: 0.6 MG/DL
PROT SERPL-MCNC: 6.8 G/DL (ref 6.4–8.3)

## 2025-05-13 ENCOUNTER — E-VISIT (OUTPATIENT)
Dept: FAMILY MEDICINE | Facility: CLINIC | Age: 40
End: 2025-05-13
Payer: COMMERCIAL

## 2025-05-13 DIAGNOSIS — R10.84 ABDOMINAL PAIN, GENERALIZED: Primary | ICD-10-CM

## 2025-05-13 DIAGNOSIS — B96.89 BACTERIAL VAGINOSIS: Primary | ICD-10-CM

## 2025-05-13 DIAGNOSIS — N76.0 BACTERIAL VAGINOSIS: Primary | ICD-10-CM

## 2025-05-13 RX ORDER — METRONIDAZOLE 500 MG/1
500 TABLET ORAL 2 TIMES DAILY
Qty: 14 TABLET | Refills: 0 | Status: SHIPPED | OUTPATIENT
Start: 2025-05-13 | End: 2025-05-20

## 2025-05-13 NOTE — PATIENT INSTRUCTIONS
Thank you for choosing us for your care. I have placed an order for a prescription so that you can start treatment. View your full visit summary for details by clicking on the link below. Your pharmacist will able to address any questions you may have about the medication.     If you re not feeling better within 2-3 days, please schedule an appointment.  You can schedule an appointment right here in [facility], or call [phone]  If the visit is for the same symptoms as your eVisit, we ll refund the cost of your eVisit if seen within seven days.

## 2025-05-13 NOTE — PATIENT INSTRUCTIONS
Thank you for choosing us for your care. I have placed the below referral(s) for you:  Orders Placed This Encounter   Procedures     Gen Surg  Referral, Adult     Please click the link for your After Visit Summary to view scheduling instructions for your referral. In most cases, you will be contacted within 2 business days to schedule. If you do not hear from a representative within that time, please call [phone] to be connected to a .

## 2025-05-14 ENCOUNTER — PATIENT OUTREACH (OUTPATIENT)
Dept: CARE COORDINATION | Facility: CLINIC | Age: 40
End: 2025-05-14
Payer: COMMERCIAL

## 2025-05-19 ENCOUNTER — MYC MEDICAL ADVICE (OUTPATIENT)
Dept: FAMILY MEDICINE | Facility: CLINIC | Age: 40
End: 2025-05-19
Payer: COMMERCIAL

## 2025-05-19 NOTE — TELEPHONE ENCOUNTER
Unfortunately, I have not evaluated her in person to know if this should be a stat CT scan (we have to justify the reason in the order).  She will need to be evaluated in person then.  Thanks.

## 2025-05-19 NOTE — TELEPHONE ENCOUNTER
See response from the PCP, to the patient, on 5/19/25, regarding CT scan request.    Writer called the patient and left a message to return call.    When the patient calls back, please relay the above message to the patient and offer to assist the patient with scheduling an in-clinic appointment for the patient's abdominal pain.    Writer also sent the patient a [employer] message with the above PCP's recommendations.    Beatris Sanchez RN, BSN  [facility]

## 2025-05-19 NOTE — TELEPHONE ENCOUNTER
Sophie,    Please see MyChart message from patient needing provider's direction.    Please respond directly to patient if appropriate or send back to the RN queue for nurse triage, if needed.    Beatris Sanchez, RN, BSN  [facility]

## 2025-06-09 ENCOUNTER — TELEPHONE (OUTPATIENT)
Dept: PHARMACY | Facility: OTHER | Age: 40
End: 2025-06-09
Payer: COMMERCIAL

## 2025-06-09 NOTE — TELEPHONE ENCOUNTER
KENDRA Recruitment: [facility]     Referral outreach attempt #1 on June 9, 2025      Outcome: left voicemail- Call back number [phone] and [employer] message sent    KENDRA Weiss

## 2025-06-12 ENCOUNTER — TELEPHONE (OUTPATIENT)
Dept: PHARMACY | Facility: OTHER | Age: 40
End: 2025-06-12
Payer: COMMERCIAL

## 2025-06-12 DIAGNOSIS — Z79.899 MEDICATION MANAGEMENT: Primary | ICD-10-CM

## 2025-06-12 NOTE — TELEPHONE ENCOUNTER
[facility] Recruitment: [facility]     Referral outreach attempt #2 on June 12, 2025      Outcome: patient requested call back at later date b/c she's in a lot of pain right now and is still at work.     Josue Romero [facility]

## 2025-06-18 ENCOUNTER — RESULTS FOLLOW-UP (OUTPATIENT)
Dept: FAMILY MEDICINE | Facility: CLINIC | Age: 40
End: 2025-06-18

## 2025-06-18 ENCOUNTER — LAB (OUTPATIENT)
Dept: LAB | Facility: CLINIC | Age: 40
End: 2025-06-18
Payer: COMMERCIAL

## 2025-06-18 DIAGNOSIS — Z11.59 NEED FOR HEPATITIS C SCREENING TEST: ICD-10-CM

## 2025-06-18 DIAGNOSIS — Z79.899 MEDICATION MANAGEMENT: ICD-10-CM

## 2025-06-18 LAB
ALBUMIN SERPL BCG-MCNC: 4.7 G/DL (ref 3.5–5.2)
ALP SERPL-CCNC: 70 U/L (ref 40–150)
ALT SERPL W P-5'-P-CCNC: 15 U/L (ref 0–50)
AST SERPL W P-5'-P-CCNC: 23 U/L (ref 0–45)
BILIRUB SERPL-MCNC: 0.5 MG/DL
BILIRUBIN DIRECT (ROCHE PRO & PURE): 0.24 MG/DL (ref 0–0.45)
CHOLEST SERPL-MCNC: 146 MG/DL
FASTING STATUS PATIENT QL REPORTED: NO
HCV AB SERPL QL IA: NONREACTIVE
HDLC SERPL-MCNC: 71 MG/DL
LDLC SERPL CALC-MCNC: 62 MG/DL
NONHDLC SERPL-MCNC: 75 MG/DL
PROT SERPL-MCNC: 6.9 G/DL (ref 6.4–8.3)
TRIGL SERPL-MCNC: 66 MG/DL

## 2025-07-12 ENCOUNTER — HEALTH MAINTENANCE LETTER (OUTPATIENT)
Age: 40
End: 2025-07-12

## 2025-07-23 ENCOUNTER — MYC MEDICAL ADVICE (OUTPATIENT)
Dept: FAMILY MEDICINE | Facility: CLINIC | Age: 40
End: 2025-07-23
Payer: COMMERCIAL

## 2025-07-23 DIAGNOSIS — B35.1 ONYCHOMYCOSIS: ICD-10-CM

## 2025-07-24 RX ORDER — TERBINAFINE HYDROCHLORIDE 250 MG/1
250 TABLET ORAL DAILY
Qty: 90 TABLET | Refills: 0 | Status: SHIPPED | OUTPATIENT
Start: 2025-07-24

## 2025-07-31 ENCOUNTER — TRANSFERRED RECORDS (OUTPATIENT)
Dept: HEALTH INFORMATION MANAGEMENT | Facility: CLINIC | Age: 40
End: 2025-07-31
Payer: COMMERCIAL

## 2025-08-04 ENCOUNTER — NURSE TRIAGE (OUTPATIENT)
Dept: FAMILY MEDICINE | Facility: CLINIC | Age: 40
End: 2025-08-04

## 2025-08-15 ASSESSMENT — ASTHMA QUESTIONNAIRES
QUESTION_3 LAST FOUR WEEKS HOW OFTEN DID YOUR ASTHMA SYMPTOMS (WHEEZING, COUGHING, SHORTNESS OF BREATH, CHEST TIGHTNESS OR PAIN) WAKE YOU UP AT NIGHT OR EARLIER THAN USUAL IN THE MORNING: NOT AT ALL
QUESTION_5 LAST FOUR WEEKS HOW WOULD YOU RATE YOUR ASTHMA CONTROL: COMPLETELY CONTROLLED
QUESTION_1 LAST FOUR WEEKS HOW MUCH OF THE TIME DID YOUR ASTHMA KEEP YOU FROM GETTING AS MUCH DONE AT WORK, SCHOOL OR AT HOME: NONE OF THE TIME
QUESTION_4 LAST FOUR WEEKS HOW OFTEN HAVE YOU USED YOUR RESCUE INHALER OR NEBULIZER MEDICATION (SUCH AS ALBUTEROL): NOT AT ALL
ACT_TOTALSCORE: 25
QUESTION_2 LAST FOUR WEEKS HOW OFTEN HAVE YOU HAD SHORTNESS OF BREATH: NOT AT ALL

## 2025-08-18 ENCOUNTER — OFFICE VISIT (OUTPATIENT)
Dept: FAMILY MEDICINE | Facility: CLINIC | Age: 40
End: 2025-08-18
Payer: COMMERCIAL

## 2025-08-18 VITALS
RESPIRATION RATE: 12 BRPM | BODY MASS INDEX: 21.17 KG/M2 | HEIGHT: 64 IN | OXYGEN SATURATION: 96 % | WEIGHT: 124 LBS | TEMPERATURE: 98.2 F | SYSTOLIC BLOOD PRESSURE: 114 MMHG | HEART RATE: 87 BPM | DIASTOLIC BLOOD PRESSURE: 76 MMHG

## 2025-08-18 DIAGNOSIS — Z01.818 PREOP EXAMINATION: Primary | ICD-10-CM

## 2025-08-18 DIAGNOSIS — M47.812 CERVICAL SPONDYLOSIS WITHOUT MYELOPATHY: ICD-10-CM

## 2025-08-18 DIAGNOSIS — Z23 NEED FOR VACCINATION: ICD-10-CM

## 2025-08-18 DIAGNOSIS — J45.20 MILD INTERMITTENT ASTHMA WITHOUT COMPLICATION: ICD-10-CM

## 2025-08-18 PROBLEM — K82.8 BILIARY DYSKINESIA: Status: RESOLVED | Noted: 2024-07-03 | Resolved: 2025-08-18

## 2025-08-18 PROBLEM — G44.209 TENSION-TYPE HEADACHE: Status: RESOLVED | Noted: 2018-05-28 | Resolved: 2025-08-18

## 2025-08-18 PROBLEM — M24.611 ARTHROFIBROSIS OF RIGHT SHOULDER: Status: RESOLVED | Noted: 2021-06-30 | Resolved: 2025-08-18

## 2025-08-18 PROBLEM — G89.29 CHRONIC PAIN: Status: RESOLVED | Noted: 2022-01-21 | Resolved: 2025-08-18

## 2025-08-18 PROBLEM — F11.20 OPIOID DEPENDENCE (H): Status: RESOLVED | Noted: 2022-01-21 | Resolved: 2025-08-18

## 2025-08-18 PROBLEM — F33.9 MAJOR DEPRESSION, RECURRENT: Status: RESOLVED | Noted: 2018-05-28 | Resolved: 2025-08-18

## 2025-08-18 PROBLEM — F41.1 ANXIETY STATE: Status: RESOLVED | Noted: 2018-05-28 | Resolved: 2025-08-18

## 2025-08-18 PROBLEM — N23 RENAL COLIC ON RIGHT SIDE: Status: RESOLVED | Noted: 2024-08-19 | Resolved: 2025-08-18

## 2025-08-18 PROBLEM — R20.2 PARESTHESIA OF ARM: Status: RESOLVED | Noted: 2025-03-12 | Resolved: 2025-08-18

## 2025-08-18 PROBLEM — M26.603 BILATERAL TEMPOROMANDIBULAR JOINT DISORDER, UNSPECIFIED: Status: RESOLVED | Noted: 2024-09-19 | Resolved: 2025-08-18

## 2025-08-18 PROBLEM — G44.40 ANALGESIC OVERUSE HEADACHE: Status: RESOLVED | Noted: 2024-09-19 | Resolved: 2025-08-18

## 2025-08-18 PROBLEM — Z79.891 LONG TERM (CURRENT) USE OF OPIATE ANALGESIC: Status: RESOLVED | Noted: 2022-01-21 | Resolved: 2025-08-18

## 2025-08-18 PROBLEM — R10.13 EPIGASTRIC PAIN: Status: RESOLVED | Noted: 2018-05-18 | Resolved: 2025-08-18

## 2025-08-18 PROBLEM — K31.9 DISORDER OF STOMACH: Status: RESOLVED | Noted: 2023-09-01 | Resolved: 2025-08-18

## 2025-08-18 PROBLEM — M54.2 NECK PAIN: Status: RESOLVED | Noted: 2019-03-26 | Resolved: 2025-08-18

## 2025-08-18 PROBLEM — T39.95XA ANALGESIC OVERUSE HEADACHE: Status: RESOLVED | Noted: 2024-09-19 | Resolved: 2025-08-18

## 2025-08-18 PROBLEM — R10.11 RIGHT UPPER QUADRANT PAIN: Status: RESOLVED | Noted: 2024-06-11 | Resolved: 2025-08-18

## 2025-08-18 PROBLEM — M19.019 LOCALIZED, PRIMARY OSTEOARTHRITIS OF SHOULDER REGION: Status: RESOLVED | Noted: 2022-01-21 | Resolved: 2025-08-18

## 2025-08-18 LAB
ANION GAP SERPL CALCULATED.3IONS-SCNC: 10 MMOL/L (ref 7–15)
BUN SERPL-MCNC: 18.4 MG/DL (ref 6–20)
CALCIUM SERPL-MCNC: 9.3 MG/DL (ref 8.8–10.4)
CHLORIDE SERPL-SCNC: 107 MMOL/L (ref 98–107)
CREAT SERPL-MCNC: 0.91 MG/DL (ref 0.51–0.95)
EGFRCR SERPLBLD CKD-EPI 2021: 81 ML/MIN/1.73M2
ERYTHROCYTE [DISTWIDTH] IN BLOOD BY AUTOMATED COUNT: 11.6 % (ref 10–15)
GLUCOSE SERPL-MCNC: 101 MG/DL (ref 70–99)
HCO3 SERPL-SCNC: 24 MMOL/L (ref 22–29)
HCT VFR BLD AUTO: 45.8 % (ref 35–47)
HGB BLD-MCNC: 15.5 G/DL (ref 11.7–15.7)
MCH RBC QN AUTO: 34.2 PG (ref 26.5–33)
MCHC RBC AUTO-ENTMCNC: 33.8 G/DL (ref 31.5–36.5)
MCV RBC AUTO: 101.1 FL (ref 78–100)
PLATELET # BLD AUTO: 242 10E3/UL (ref 150–450)
POTASSIUM SERPL-SCNC: 4.6 MMOL/L (ref 3.4–5.3)
RBC # BLD AUTO: 4.53 10E6/UL (ref 3.8–5.2)
SODIUM SERPL-SCNC: 141 MMOL/L (ref 135–145)
WBC # BLD AUTO: 7.61 10E3/UL (ref 4–11)

## 2025-08-18 PROCEDURE — G2211 COMPLEX E/M VISIT ADD ON: HCPCS | Performed by: FAMILY MEDICINE

## 2025-08-18 PROCEDURE — 36415 COLL VENOUS BLD VENIPUNCTURE: CPT | Performed by: FAMILY MEDICINE

## 2025-08-18 PROCEDURE — 99214 OFFICE O/P EST MOD 30 MIN: CPT | Mod: 25 | Performed by: FAMILY MEDICINE

## 2025-08-18 PROCEDURE — 3074F SYST BP LT 130 MM HG: CPT | Performed by: FAMILY MEDICINE

## 2025-08-18 PROCEDURE — 85027 COMPLETE CBC AUTOMATED: CPT | Performed by: FAMILY MEDICINE

## 2025-08-18 PROCEDURE — 3078F DIAST BP <80 MM HG: CPT | Performed by: FAMILY MEDICINE

## 2025-08-18 PROCEDURE — 90715 TDAP VACCINE 7 YRS/> IM: CPT | Performed by: FAMILY MEDICINE

## 2025-08-18 PROCEDURE — 80048 BASIC METABOLIC PNL TOTAL CA: CPT | Performed by: FAMILY MEDICINE

## 2025-08-18 PROCEDURE — 90471 IMMUNIZATION ADMIN: CPT | Performed by: FAMILY MEDICINE

## 2025-08-18 RX ORDER — IBUPROFEN 800 MG/1
TABLET, FILM COATED ORAL
COMMUNITY
Start: 2025-07-29

## 2025-08-18 ASSESSMENT — PATIENT HEALTH QUESTIONNAIRE - PHQ9
SUM OF ALL RESPONSES TO PHQ QUESTIONS 1-9: 18
SUM OF ALL RESPONSES TO PHQ QUESTIONS 1-9: 18
10. IF YOU CHECKED OFF ANY PROBLEMS, HOW DIFFICULT HAVE THESE PROBLEMS MADE IT FOR YOU TO DO YOUR WORK, TAKE CARE OF THINGS AT HOME, OR GET ALONG WITH OTHER PEOPLE: EXTREMELY DIFFICULT

## 2025-08-19 ENCOUNTER — RESULTS FOLLOW-UP (OUTPATIENT)
Dept: FAMILY MEDICINE | Facility: CLINIC | Age: 40
End: 2025-08-19
Payer: COMMERCIAL

## (undated) DEVICE — GLOVE BIOGEL PI MICRO SZ 7.0 48570

## (undated) DEVICE — SOL NACL 0.9% INJ 1000ML BAG 07983-09

## (undated) DEVICE — SUCTION IRR STRYKERFLOW II W/TIP 250-070-520

## (undated) DEVICE — LINEN TOWEL PACK X6 WHITE 5487

## (undated) DEVICE — SU MONOCRYL 4-0 PS-2 27" UND Y426H

## (undated) DEVICE — ENDO TROCAR FIRST ENTRY KII FIOS ADV FIX 05X100MM CFF03

## (undated) DEVICE — BLADE CLIPPER SGL USE 9680

## (undated) DEVICE — LINEN TOWEL PACK X30 5481

## (undated) DEVICE — SOL NACL 0.9% IRRIG 1000ML BOTTLE 07138-09

## (undated) DEVICE — SUCTION MANIFOLD NEPTUNE 2 SYS 4 PORT 0702-020-000

## (undated) DEVICE — TUBING SMOKE EVAC PNEUMOCLEAR HIGH FLOW 0620050250

## (undated) DEVICE — ENDO TROCAR SLEEVE KII ADV FIXATION 05X100MM CFS02

## (undated) DEVICE — CLIP APPLIER ENDO 5MM M/L LIGAMAX EL5ML

## (undated) DEVICE — Device

## (undated) DEVICE — SOL WATER IRRIG 1000ML BOTTLE 2F7114

## (undated) DEVICE — GLOVE BIOGEL PI MICRO INDICATOR UNDERGLOVE SZ 7.5 48975

## (undated) DEVICE — NDL INSUFFLATION 13GA 120MM C2201

## (undated) DEVICE — ENDO POUCH UNIVERSAL RETRIEVAL SYSTEM INZII 5MM CD003

## (undated) DEVICE — SU DERMABOND ADVANCED .7ML DNX12

## (undated) DEVICE — PREP CHLORAPREP 26ML TINTED HI-LITE ORANGE 930815

## (undated) DEVICE — ANTIFOG SOLUTION W/FOAM PAD CF-1002

## (undated) DEVICE — DRAPE SHEET REV FOLD 3/4 9349

## (undated) DEVICE — SYR 30ML LL W/O NDL 302832

## (undated) DEVICE — ESU GROUND PAD ADULT W/CORD E7507

## (undated) RX ORDER — FENTANYL CITRATE 50 UG/ML
INJECTION, SOLUTION INTRAMUSCULAR; INTRAVENOUS
Status: DISPENSED
Start: 2024-07-08

## (undated) RX ORDER — INDOCYANINE GREEN AND WATER 25 MG
KIT INJECTION
Status: DISPENSED
Start: 2024-07-08

## (undated) RX ORDER — CEFAZOLIN SODIUM/WATER 2 G/20 ML
SYRINGE (ML) INTRAVENOUS
Status: DISPENSED
Start: 2024-07-08

## (undated) RX ORDER — OXYCODONE HYDROCHLORIDE 5 MG/1
TABLET ORAL
Status: DISPENSED
Start: 2024-07-08

## (undated) RX ORDER — HYDROMORPHONE HYDROCHLORIDE 1 MG/ML
INJECTION, SOLUTION INTRAMUSCULAR; INTRAVENOUS; SUBCUTANEOUS
Status: DISPENSED
Start: 2024-07-08

## (undated) RX ORDER — BUPIVACAINE HYDROCHLORIDE AND EPINEPHRINE 2.5; 5 MG/ML; UG/ML
INJECTION, SOLUTION EPIDURAL; INFILTRATION; INTRACAUDAL; PERINEURAL
Status: DISPENSED
Start: 2024-07-08

## (undated) RX ORDER — BUPIVACAINE HYDROCHLORIDE AND EPINEPHRINE 5; 5 MG/ML; UG/ML
INJECTION, SOLUTION PERINEURAL
Status: DISPENSED
Start: 2024-07-08